# Patient Record
Sex: MALE | Race: BLACK OR AFRICAN AMERICAN | Employment: OTHER | URBAN - METROPOLITAN AREA
[De-identification: names, ages, dates, MRNs, and addresses within clinical notes are randomized per-mention and may not be internally consistent; named-entity substitution may affect disease eponyms.]

---

## 2017-04-19 ENCOUNTER — HOSPITAL ENCOUNTER (OUTPATIENT)
Dept: ENDOSCOPY | Age: 67
Discharge: HOME | End: 2017-04-19
Attending: INTERNAL MEDICINE | Admitting: INTERNAL MEDICINE

## 2017-04-19 VITALS
RESPIRATION RATE: 16 BRPM | HEART RATE: 50 BPM | BODY MASS INDEX: 28.88 KG/M2 | WEIGHT: 195 LBS | SYSTOLIC BLOOD PRESSURE: 161 MMHG | OXYGEN SATURATION: 98 % | HEIGHT: 69 IN | DIASTOLIC BLOOD PRESSURE: 94 MMHG | TEMPERATURE: 97.3 F

## 2017-04-19 RX ORDER — FENTANYL CITRATE 50 UG/ML
50 INJECTION, SOLUTION INTRAMUSCULAR; INTRAVENOUS EVERY 5 MIN PRN
Status: DISCONTINUED | OUTPATIENT
Start: 2017-04-19 | End: 2017-04-20 | Stop reason: HOSPADM

## 2017-04-19 RX ORDER — POTASSIUM CHLORIDE 7.45 MG/ML
10 INJECTION INTRAVENOUS 2 TIMES DAILY
COMMUNITY
End: 2019-03-06 | Stop reason: SDUPTHER

## 2017-04-19 RX ORDER — OXYCODONE HYDROCHLORIDE AND ACETAMINOPHEN 5; 325 MG/1; MG/1
2 TABLET ORAL PRN
Status: ACTIVE | OUTPATIENT
Start: 2017-04-19 | End: 2017-04-19

## 2017-04-19 RX ORDER — LIDOCAINE HYDROCHLORIDE 10 MG/ML
1 INJECTION, SOLUTION EPIDURAL; INFILTRATION; INTRACAUDAL; PERINEURAL
Status: ACTIVE | OUTPATIENT
Start: 2017-04-19 | End: 2017-04-19

## 2017-04-19 RX ORDER — MEPERIDINE HYDROCHLORIDE 25 MG/ML
12.5 INJECTION INTRAMUSCULAR; INTRAVENOUS; SUBCUTANEOUS EVERY 5 MIN PRN
Status: DISCONTINUED | OUTPATIENT
Start: 2017-04-19 | End: 2017-04-20 | Stop reason: HOSPADM

## 2017-04-19 RX ORDER — LABETALOL HYDROCHLORIDE 5 MG/ML
5 INJECTION, SOLUTION INTRAVENOUS EVERY 10 MIN PRN
Status: DISCONTINUED | OUTPATIENT
Start: 2017-04-19 | End: 2017-04-20 | Stop reason: HOSPADM

## 2017-04-19 RX ORDER — FENTANYL CITRATE 50 UG/ML
25 INJECTION, SOLUTION INTRAMUSCULAR; INTRAVENOUS EVERY 5 MIN PRN
Status: DISCONTINUED | OUTPATIENT
Start: 2017-04-19 | End: 2017-04-20 | Stop reason: HOSPADM

## 2017-04-19 RX ORDER — SODIUM CHLORIDE 9 MG/ML
INJECTION, SOLUTION INTRAVENOUS CONTINUOUS
Status: DISCONTINUED | OUTPATIENT
Start: 2017-04-19 | End: 2017-04-20 | Stop reason: HOSPADM

## 2017-04-19 RX ORDER — OXYCODONE HYDROCHLORIDE AND ACETAMINOPHEN 5; 325 MG/1; MG/1
1 TABLET ORAL PRN
Status: ACTIVE | OUTPATIENT
Start: 2017-04-19 | End: 2017-04-19

## 2017-04-19 RX ORDER — HYDROMORPHONE HCL 110MG/55ML
0.5 PATIENT CONTROLLED ANALGESIA SYRINGE INTRAVENOUS EVERY 5 MIN PRN
Status: DISCONTINUED | OUTPATIENT
Start: 2017-04-19 | End: 2017-04-20 | Stop reason: HOSPADM

## 2017-04-19 RX ORDER — ONDANSETRON 2 MG/ML
4 INJECTION INTRAMUSCULAR; INTRAVENOUS
Status: ACTIVE | OUTPATIENT
Start: 2017-04-19 | End: 2017-04-19

## 2017-04-19 RX ORDER — HYDROMORPHONE HCL 110MG/55ML
0.25 PATIENT CONTROLLED ANALGESIA SYRINGE INTRAVENOUS EVERY 5 MIN PRN
Status: DISCONTINUED | OUTPATIENT
Start: 2017-04-19 | End: 2017-04-20 | Stop reason: HOSPADM

## 2017-04-19 RX ORDER — SODIUM CHLORIDE 0.9 % (FLUSH) 0.9 %
10 SYRINGE (ML) INJECTION EVERY 12 HOURS SCHEDULED
Status: DISCONTINUED | OUTPATIENT
Start: 2017-04-19 | End: 2017-04-20 | Stop reason: HOSPADM

## 2017-04-19 RX ORDER — SODIUM CHLORIDE 0.9 % (FLUSH) 0.9 %
10 SYRINGE (ML) INJECTION PRN
Status: DISCONTINUED | OUTPATIENT
Start: 2017-04-19 | End: 2017-04-20 | Stop reason: HOSPADM

## 2017-04-19 RX ADMIN — SODIUM CHLORIDE: 9 INJECTION, SOLUTION INTRAVENOUS at 10:03

## 2017-04-19 ASSESSMENT — PAIN - FUNCTIONAL ASSESSMENT: PAIN_FUNCTIONAL_ASSESSMENT: 0-10

## 2018-03-09 ENCOUNTER — HOSPITAL ENCOUNTER (OUTPATIENT)
Dept: CT IMAGING | Age: 68
Discharge: HOME | End: 2018-03-09
Attending: INTERNAL MEDICINE | Admitting: INTERNAL MEDICINE

## 2018-03-09 DIAGNOSIS — Q61.00 CONGENITAL RENAL CYST OF RIGHT KIDNEY: ICD-10-CM

## 2018-03-09 DIAGNOSIS — Q61.00 CONGENITAL RENAL CYST: ICD-10-CM

## 2018-05-15 RX ORDER — MEPERIDINE HYDROCHLORIDE 25 MG/ML
12.5 INJECTION INTRAMUSCULAR; INTRAVENOUS; SUBCUTANEOUS EVERY 5 MIN PRN
Status: CANCELLED | OUTPATIENT
Start: 2018-05-15

## 2018-05-15 RX ORDER — PROMETHAZINE HYDROCHLORIDE 25 MG/ML
6.25 INJECTION, SOLUTION INTRAMUSCULAR; INTRAVENOUS EVERY 30 MIN PRN
Status: CANCELLED | OUTPATIENT
Start: 2018-05-15

## 2018-05-15 RX ORDER — HYDROMORPHONE HCL 110MG/55ML
0.25 PATIENT CONTROLLED ANALGESIA SYRINGE INTRAVENOUS EVERY 5 MIN PRN
Status: CANCELLED | OUTPATIENT
Start: 2018-05-15

## 2018-05-15 RX ORDER — FENTANYL CITRATE 50 UG/ML
50 INJECTION, SOLUTION INTRAMUSCULAR; INTRAVENOUS EVERY 5 MIN PRN
Status: CANCELLED | OUTPATIENT
Start: 2018-05-15

## 2018-05-15 RX ORDER — HYDROCODONE BITARTRATE AND ACETAMINOPHEN 5; 325 MG/1; MG/1
1 TABLET ORAL PRN
Status: CANCELLED | OUTPATIENT
Start: 2018-05-15 | End: 2018-05-15

## 2018-05-15 RX ORDER — HYDROMORPHONE HCL 110MG/55ML
0.5 PATIENT CONTROLLED ANALGESIA SYRINGE INTRAVENOUS EVERY 5 MIN PRN
Status: CANCELLED | OUTPATIENT
Start: 2018-05-15

## 2018-05-15 RX ORDER — DIPHENHYDRAMINE HYDROCHLORIDE 50 MG/ML
12.5 INJECTION INTRAMUSCULAR; INTRAVENOUS
Status: CANCELLED | OUTPATIENT
Start: 2018-05-15 | End: 2018-05-15

## 2018-05-15 RX ORDER — FENTANYL CITRATE 50 UG/ML
25 INJECTION, SOLUTION INTRAMUSCULAR; INTRAVENOUS EVERY 5 MIN PRN
Status: CANCELLED | OUTPATIENT
Start: 2018-05-15

## 2018-05-15 RX ORDER — HYDROCODONE BITARTRATE AND ACETAMINOPHEN 5; 325 MG/1; MG/1
2 TABLET ORAL PRN
Status: CANCELLED | OUTPATIENT
Start: 2018-05-15 | End: 2018-05-15

## 2018-06-06 ENCOUNTER — HOSPITAL ENCOUNTER (OUTPATIENT)
Dept: ENDOSCOPY | Age: 68
Discharge: HOME | End: 2018-06-06
Attending: INTERNAL MEDICINE | Admitting: INTERNAL MEDICINE

## 2018-06-06 VITALS
WEIGHT: 200 LBS | OXYGEN SATURATION: 98 % | SYSTOLIC BLOOD PRESSURE: 176 MMHG | HEIGHT: 69 IN | HEART RATE: 56 BPM | BODY MASS INDEX: 29.62 KG/M2 | RESPIRATION RATE: 16 BRPM | DIASTOLIC BLOOD PRESSURE: 99 MMHG | TEMPERATURE: 97.1 F

## 2018-06-06 RX ORDER — OMEPRAZOLE 20 MG/1
20 CAPSULE, DELAYED RELEASE ORAL DAILY
COMMUNITY
End: 2021-03-16

## 2018-06-06 RX ORDER — POTASSIUM CHLORIDE 20 MEQ/1
20 TABLET, EXTENDED RELEASE ORAL 2 TIMES DAILY
COMMUNITY
End: 2019-08-06

## 2018-06-06 RX ORDER — SODIUM CHLORIDE 0.9 % (FLUSH) 0.9 %
10 SYRINGE (ML) INJECTION EVERY 12 HOURS SCHEDULED
Status: DISCONTINUED | OUTPATIENT
Start: 2018-06-06 | End: 2018-06-07 | Stop reason: HOSPADM

## 2018-06-06 RX ORDER — DIPHENHYDRAMINE HYDROCHLORIDE 50 MG/ML
12.5 INJECTION INTRAMUSCULAR; INTRAVENOUS
Status: ACTIVE | OUTPATIENT
Start: 2018-06-06 | End: 2018-06-06

## 2018-06-06 RX ORDER — PROMETHAZINE HYDROCHLORIDE 25 MG/ML
6.25 INJECTION, SOLUTION INTRAMUSCULAR; INTRAVENOUS EVERY 30 MIN PRN
Status: DISCONTINUED | OUTPATIENT
Start: 2018-06-06 | End: 2018-06-07 | Stop reason: HOSPADM

## 2018-06-06 RX ORDER — HYDROMORPHONE HCL 110MG/55ML
0.25 PATIENT CONTROLLED ANALGESIA SYRINGE INTRAVENOUS EVERY 5 MIN PRN
Status: DISCONTINUED | OUTPATIENT
Start: 2018-06-06 | End: 2018-06-07 | Stop reason: HOSPADM

## 2018-06-06 RX ORDER — SODIUM CHLORIDE 9 MG/ML
INJECTION, SOLUTION INTRAVENOUS CONTINUOUS
Status: DISCONTINUED | OUTPATIENT
Start: 2018-06-06 | End: 2018-06-07 | Stop reason: HOSPADM

## 2018-06-06 RX ORDER — FENTANYL CITRATE 50 UG/ML
25 INJECTION, SOLUTION INTRAMUSCULAR; INTRAVENOUS EVERY 5 MIN PRN
Status: DISCONTINUED | OUTPATIENT
Start: 2018-06-06 | End: 2018-06-07 | Stop reason: HOSPADM

## 2018-06-06 RX ORDER — HYDROMORPHONE HCL 110MG/55ML
0.5 PATIENT CONTROLLED ANALGESIA SYRINGE INTRAVENOUS EVERY 5 MIN PRN
Status: DISCONTINUED | OUTPATIENT
Start: 2018-06-06 | End: 2018-06-07 | Stop reason: HOSPADM

## 2018-06-06 RX ORDER — SODIUM CHLORIDE 0.9 % (FLUSH) 0.9 %
10 SYRINGE (ML) INJECTION PRN
Status: DISCONTINUED | OUTPATIENT
Start: 2018-06-06 | End: 2018-06-07 | Stop reason: HOSPADM

## 2018-06-06 RX ORDER — FENTANYL CITRATE 50 UG/ML
50 INJECTION, SOLUTION INTRAMUSCULAR; INTRAVENOUS EVERY 5 MIN PRN
Status: DISCONTINUED | OUTPATIENT
Start: 2018-06-06 | End: 2018-06-07 | Stop reason: HOSPADM

## 2018-06-06 RX ORDER — MULTIVIT-MIN/IRON/FOLIC ACID/K 18-600-40
CAPSULE ORAL DAILY
COMMUNITY
End: 2022-08-29

## 2018-06-06 RX ORDER — MEPERIDINE HYDROCHLORIDE 25 MG/ML
12.5 INJECTION INTRAMUSCULAR; INTRAVENOUS; SUBCUTANEOUS EVERY 5 MIN PRN
Status: DISCONTINUED | OUTPATIENT
Start: 2018-06-06 | End: 2018-06-07 | Stop reason: HOSPADM

## 2018-06-06 RX ORDER — HYDROCODONE BITARTRATE AND ACETAMINOPHEN 5; 325 MG/1; MG/1
2 TABLET ORAL PRN
Status: ACTIVE | OUTPATIENT
Start: 2018-06-06 | End: 2018-06-06

## 2018-06-06 RX ORDER — HYDROCODONE BITARTRATE AND ACETAMINOPHEN 5; 325 MG/1; MG/1
1 TABLET ORAL PRN
Status: ACTIVE | OUTPATIENT
Start: 2018-06-06 | End: 2018-06-06

## 2018-06-06 RX ORDER — LIDOCAINE HYDROCHLORIDE 10 MG/ML
1 INJECTION, SOLUTION EPIDURAL; INFILTRATION; INTRACAUDAL; PERINEURAL
Status: ACTIVE | OUTPATIENT
Start: 2018-06-06 | End: 2018-06-06

## 2018-06-06 RX ADMIN — SODIUM CHLORIDE: 9 INJECTION, SOLUTION INTRAVENOUS at 12:12

## 2018-06-06 ASSESSMENT — PAIN - FUNCTIONAL ASSESSMENT: PAIN_FUNCTIONAL_ASSESSMENT: 0-10

## 2018-06-06 ASSESSMENT — PAIN SCALES - GENERAL
PAINLEVEL_OUTOF10: 0

## 2019-03-06 ENCOUNTER — OFFICE VISIT (OUTPATIENT)
Dept: CARDIOLOGY CLINIC | Age: 69
End: 2019-03-06
Payer: MEDICARE

## 2019-03-06 VITALS
HEART RATE: 60 BPM | WEIGHT: 215.8 LBS | DIASTOLIC BLOOD PRESSURE: 74 MMHG | BODY MASS INDEX: 31.96 KG/M2 | HEIGHT: 69 IN | SYSTOLIC BLOOD PRESSURE: 134 MMHG

## 2019-03-06 DIAGNOSIS — E78.49 OTHER HYPERLIPIDEMIA: ICD-10-CM

## 2019-03-06 DIAGNOSIS — I49.9 IRREGULAR HEART BEAT: Primary | ICD-10-CM

## 2019-03-06 DIAGNOSIS — G47.33 OSA (OBSTRUCTIVE SLEEP APNEA): ICD-10-CM

## 2019-03-06 DIAGNOSIS — R94.31 ABNORMAL EKG: ICD-10-CM

## 2019-03-06 PROCEDURE — G8484 FLU IMMUNIZE NO ADMIN: HCPCS | Performed by: INTERNAL MEDICINE

## 2019-03-06 PROCEDURE — G8417 CALC BMI ABV UP PARAM F/U: HCPCS | Performed by: INTERNAL MEDICINE

## 2019-03-06 PROCEDURE — G8427 DOCREV CUR MEDS BY ELIG CLIN: HCPCS | Performed by: INTERNAL MEDICINE

## 2019-03-06 PROCEDURE — 3017F COLORECTAL CA SCREEN DOC REV: CPT | Performed by: INTERNAL MEDICINE

## 2019-03-06 PROCEDURE — 93000 ELECTROCARDIOGRAM COMPLETE: CPT | Performed by: INTERNAL MEDICINE

## 2019-03-06 PROCEDURE — 1101F PT FALLS ASSESS-DOCD LE1/YR: CPT | Performed by: INTERNAL MEDICINE

## 2019-03-06 PROCEDURE — 99214 OFFICE O/P EST MOD 30 MIN: CPT | Performed by: INTERNAL MEDICINE

## 2019-03-07 ENCOUNTER — NURSE ONLY (OUTPATIENT)
Dept: CARDIOLOGY CLINIC | Age: 69
End: 2019-03-07

## 2019-03-07 DIAGNOSIS — I48.91 ATRIAL FIBRILLATION, UNSPECIFIED TYPE (HCC): Primary | ICD-10-CM

## 2019-03-07 DIAGNOSIS — R94.31 ABNORMAL EKG: Primary | ICD-10-CM

## 2019-03-07 DIAGNOSIS — R07.9 CHEST PAIN, UNSPECIFIED TYPE: ICD-10-CM

## 2019-03-07 DIAGNOSIS — R94.31 ABNORMAL EKG: ICD-10-CM

## 2019-03-07 PROCEDURE — 93224 XTRNL ECG REC UP TO 48 HRS: CPT | Performed by: INTERNAL MEDICINE

## 2019-03-27 LAB
LV EF: 72 %
LVEF MODALITY: NORMAL

## 2019-03-29 LAB — TSH ULTRASENSITIVE: 1.45 MCIU/ML (ref 0.27–4.2)

## 2019-03-31 LAB — VITAMIN D 1,25-DIHYDROXY: 61.3 PG/ML (ref 19.9–79.3)

## 2019-04-02 DIAGNOSIS — R94.31 ABNORMAL EKG: ICD-10-CM

## 2019-04-10 ENCOUNTER — OFFICE VISIT (OUTPATIENT)
Dept: CARDIOLOGY CLINIC | Age: 69
End: 2019-04-10
Payer: MEDICARE

## 2019-04-10 VITALS
SYSTOLIC BLOOD PRESSURE: 121 MMHG | HEART RATE: 63 BPM | BODY MASS INDEX: 31.45 KG/M2 | WEIGHT: 213 LBS | DIASTOLIC BLOOD PRESSURE: 80 MMHG

## 2019-04-10 DIAGNOSIS — R00.1 BRADYCARDIA: ICD-10-CM

## 2019-04-10 PROCEDURE — 1123F ACP DISCUSS/DSCN MKR DOCD: CPT | Performed by: NURSE PRACTITIONER

## 2019-04-10 PROCEDURE — G8427 DOCREV CUR MEDS BY ELIG CLIN: HCPCS | Performed by: NURSE PRACTITIONER

## 2019-04-10 PROCEDURE — 3017F COLORECTAL CA SCREEN DOC REV: CPT | Performed by: NURSE PRACTITIONER

## 2019-04-10 PROCEDURE — G8417 CALC BMI ABV UP PARAM F/U: HCPCS | Performed by: NURSE PRACTITIONER

## 2019-04-10 PROCEDURE — 1036F TOBACCO NON-USER: CPT | Performed by: NURSE PRACTITIONER

## 2019-04-10 PROCEDURE — 99214 OFFICE O/P EST MOD 30 MIN: CPT | Performed by: NURSE PRACTITIONER

## 2019-04-10 PROCEDURE — 4040F PNEUMOC VAC/ADMIN/RCVD: CPT | Performed by: NURSE PRACTITIONER

## 2019-04-10 NOTE — PATIENT INSTRUCTIONS
Plan:  Holter monitor  4/2019 HR     1st degree av block / Wenckebach with lightheaded and syncope years ago    Stop bystolic cont Norvasc   Discussed PPM / EP consult  Sleep study scheduled   Fu in 3-4 weeks

## 2019-04-10 NOTE — PROGRESS NOTES
gastric nodule biopsy     Family History   Problem Relation Age of Onset    High Blood Pressure Mother      Social History     Tobacco Use    Smoking status: Never Smoker    Smokeless tobacco: Never Used   Substance Use Topics    Alcohol use: No    Drug use: No       No Known Allergies  Current Outpatient Medications   Medication Sig Dispense Refill    potassium chloride (KLOR-CON M) 20 MEQ extended release tablet Take 20 mEq by mouth 2 times daily       Cholecalciferol (VITAMIN D) 2000 units CAPS capsule Take by mouth daily      omeprazole (PRILOSEC) 20 MG delayed release capsule Take 20 mg by mouth daily      nebivolol (BYSTOLIC) 5 MG tablet Take 5 mg by mouth daily.  amLODIPine (NORVASC) 10 MG tablet Take 10 mg by mouth daily. No current facility-administered medications for this visit. Physical Exam:   /80   Pulse 63   Wt 213 lb (96.6 kg)   BMI 31.45 kg/m²   BP Readings from Last 3 Encounters:   04/10/19 121/80   03/06/19 134/74   06/06/18 (!) 176/99     Pulse Readings from Last 3 Encounters:   04/10/19 63   03/06/19 60   06/06/18 56     Wt Readings from Last 3 Encounters:   04/10/19 213 lb (96.6 kg)   03/06/19 215 lb 12.8 oz (97.9 kg)   06/06/18 200 lb (90.7 kg)     Constitutional: He is oriented to person, place, and time. He appears well-developed and well-nourished. In no acute distress. HEENT: Normocephalic and atraumatic. Sclerae anicteric. No xanthelasmas. Conjunctiva white, no subconjunctival hemorrhage   External inspection of ears nose teeth & gums   Eyes:PERRLA EOM's intact. Neck: Neck supple. No JVD present. Carotids without bruits. No mass and no thyromegaly present. No lymphadenopathy present. Cardiovascular: RRR, normal S1 and S2; no murmur/gallop or rub, PMI nondisplaced  Pulmonary/Chest: Effort normal.  Lungs clear to auscultation. Chest wall nontender  Abdominal: soft, nontender, nondistended. + bowel sounds; no organomegaly or bruits.  Aorta normal  Extremities: No edema, cyanosis, or clubbing. Pulses are 2+ radial/carotid/dorsalis pedis bilaterally. Cap refill brisk. Neurological: No cranial nerve deficit. Psychiatric: He has a normal mood and affect. His speech is normal and behavior is normal.     Lab Review:   No results found for: TRIG, HDL, LDLCALC, LDLDIRECT, LABVLDL  No results found for: NA, K, CL, CO2, BUN, CREATININE, GLUCOSE, CALCIUM   No results found for: WBC, HGB, HCT, MCV, PLT      I have reviewed any available labs, images, any stress test, LHC on this pt       Please cc this note to PCP    Assessment:    pt with abnormal EKG / this was noted during colonoscopy     Today no c/o cp SOB edema, denies PND, MC? ? Had syncopal event over 5 years ago after much testing etiology was unknown      stress test 3/2013 LV EF is 56%  There is a small sized, fixed defect in the inferior wall. This defect is consistent with diaphragm attenuation. Frequent PVC's.      Hx PVCs  On bystolic  TSH wnl      Hx HTN  wnl     hypokalemia   On potassium klor con 20meq daily      MC? ? Sleep study ordered & has appt     Today no c/o cp SOB edmea, / PND  /  having tests for MC   Discussed his holter /  echo  / stress test result     3/2019 stress test / no significant abnormally   Holter monitor  4/2019 HR     1st degree av block / Wenckebach with lightheaded and syncope years ago    Stop bystolic cont Norvasc     3/27/19 echo :  Overall left ventricular ejection fraction is estimated to be 60-65%. The left ventricular function is normal.  There is mild concentric left ventricular hypertrophy. The left ventricular wall motion is normal.  The diastolic function is impaired and classified as Grade 1  (impaired relaxation). The left atrium is mildly dilated. Mild tricuspid regurgitation is present.        Plan:  Holter monitor  4/2019 HR     1st degree av block / Wenckebach with lightheaded and syncope years ago    Stop bystolic cont Norvasc Discussed PPM / EP consult  Sleep study scheduled   Fu in 3-4 weeks       Thanks for allowing me to participate in the care of this patient.   Please cc this note to PCP      NICOLE Flynn

## 2019-04-15 NOTE — PROGRESS NOTES
supple. No rigidity. No JVD present. · Cardiovascular: Normal rate, regular rhythm, S1&S2 and intact distal pulses. · Pulmonary/Chest: Bilateral respiratory sounds. No wheezes. No rhonchi. · Abdominal: Soft. Bowel sounds present. No distension, No tenderness. · Musculoskeletal: No tenderness. No edema    · Lymphadenopathy: Has no cervical adenopathy. · Neurological: Alert and oriented. Cranial nerve appears intact, No Gross deficit   · Skin: Skin is warm and dry. No rash noted. · Psychiatric: Has a normal mood, affect and behavior     Labs:  Reviewed. ECG: reviewed, 63 Sinus  Rhythm, with QRS duration 100 ms. No pathologic Q waves, ventricular pre-excitation, or QT prolongation. Studies:   1. Event monitor: Holter monitor  4/2019 HR     1st degree av block / Wenckebach     2.8 seconds nocturnal pause    2. Echo:   3/27/19 echo :  Overall left ventricular ejection fraction is estimated to be 60-65%. The left ventricular function is normal.  There is mild concentric left ventricular hypertrophy. The left ventricular wall motion is normal.  The diastolic function is impaired and classified as Grade 1  (impaired relaxation). The left atrium is mildly dilated. Mild tricuspid regurgitation is present. 3. Stress Test:  3/7/19  IMAGING FINDINGS:  LVEDV:   119ml     (Normal is up to 100ml for women and 150ml for men)  LVEF:   72 %      (Normal is at least 62% for women and 53% for men)  TRANSIENT DILATATION RATIO:    0.72      (Normal is up to 1.3 for women and 1.2 for men)  LV WALL MOTION:   Unremarkable  SPECT PERFUSION IMAGES:   Satisfactory activity throughout the left ventricle myocardium at   stress and at rest     OTHER:  None      IMPRESSION:  No significant abnormality      The MCOT, echocardiogram, stress test, and coronary angiography/PCI were reviewed by myself and used for my plan of care.      Procedures:  1. EP study    Assessment/Plan:   Hx of Syncope - fell face forward  Significantly prolonged VT interval, more than 400 ms  Mobitz Type 1 block  Nocturnal pause of 2.8 seconds  Normal LVEF  Recommend EP study to evaluate conduction system and ventricular arrhythmia. Ahsan Delgado RN, am scribing for and in the presence of Elijah Potter MD 04/23/19   10:33 AM  WENCESLAO Rivers MD, personally performed the services prescribed in this documentation as scribed by Ms. Severo Mederos RN in my presence and it is both accurate and complete. Thank you for allowing me to participate in the care of [facility]. All questions and concerns were addressed to the patient/family. Alternatives to my treatment were discussed.        Elijah Potter MD  Cardiac Electrophysiology  [facility] [place]

## 2019-04-23 ENCOUNTER — TELEPHONE (OUTPATIENT)
Dept: CARDIOLOGY CLINIC | Age: 69
End: 2019-04-23

## 2019-04-23 ENCOUNTER — OFFICE VISIT (OUTPATIENT)
Dept: CARDIOLOGY CLINIC | Age: 69
End: 2019-04-23
Payer: MEDICARE

## 2019-04-23 VITALS
HEIGHT: 69 IN | DIASTOLIC BLOOD PRESSURE: 82 MMHG | HEART RATE: 62 BPM | WEIGHT: 210.6 LBS | BODY MASS INDEX: 31.19 KG/M2 | SYSTOLIC BLOOD PRESSURE: 142 MMHG

## 2019-04-23 DIAGNOSIS — R94.31 ABNORMAL EKG: ICD-10-CM

## 2019-04-23 DIAGNOSIS — R00.1 BRADYCARDIA: Primary | ICD-10-CM

## 2019-04-23 DIAGNOSIS — I48.91 ATRIAL FIBRILLATION, UNSPECIFIED TYPE (HCC): ICD-10-CM

## 2019-04-23 PROCEDURE — 93000 ELECTROCARDIOGRAM COMPLETE: CPT | Performed by: INTERNAL MEDICINE

## 2019-04-23 PROCEDURE — 99204 OFFICE O/P NEW MOD 45 MIN: CPT | Performed by: INTERNAL MEDICINE

## 2019-04-25 ENCOUNTER — PRE-PROCEDURE TELEPHONE (OUTPATIENT)
Dept: CARDIAC CATH/INVASIVE PROCEDURES | Age: 69
End: 2019-04-25

## 2019-04-26 ENCOUNTER — HOSPITAL ENCOUNTER (OUTPATIENT)
Dept: CARDIAC CATH/INVASIVE PROCEDURES | Age: 69
Discharge: HOME | End: 2019-04-28
Payer: MEDICARE

## 2019-04-26 VITALS — TEMPERATURE: 97.2 F

## 2019-04-26 LAB
ANION GAP SERPL CALCULATED.3IONS-SCNC: 13 MMOL/L (ref 3–16)
BUN BLDV-MCNC: 23 MG/DL (ref 7–20)
CALCIUM SERPL-MCNC: 9.3 MG/DL (ref 8.3–10.6)
CHLORIDE BLD-SCNC: 102 MMOL/L (ref 99–110)
CO2: 26 MMOL/L (ref 21–32)
CREAT SERPL-MCNC: 1.2 MG/DL (ref 0.8–1.3)
EKG ATRIAL RATE: 61 BPM
EKG DIAGNOSIS: NORMAL
EKG Q-T INTERVAL: 424 MS
EKG QRS DURATION: 88 MS
EKG QTC CALCULATION (BAZETT): 424 MS
EKG R AXIS: 21 DEGREES
EKG T AXIS: -85 DEGREES
EKG VENTRICULAR RATE: 60 BPM
GFR AFRICAN AMERICAN: >60
GFR NON-AFRICAN AMERICAN: >60
GLUCOSE BLD-MCNC: 85 MG/DL (ref 70–99)
HCT VFR BLD CALC: 41.6 % (ref 40.5–52.5)
HEMOGLOBIN: 13.7 G/DL (ref 13.5–17.5)
INR BLD: 1.25 (ref 0.86–1.14)
MCH RBC QN AUTO: 28.7 PG (ref 26–34)
MCHC RBC AUTO-ENTMCNC: 32.8 G/DL (ref 31–36)
MCV RBC AUTO: 87.3 FL (ref 80–100)
PDW BLD-RTO: 13.3 % (ref 12.4–15.4)
PLATELET # BLD: 215 K/UL (ref 135–450)
PMV BLD AUTO: 8.7 FL (ref 5–10.5)
POTASSIUM REFLEX MAGNESIUM: 3.6 MMOL/L (ref 3.5–5.1)
PROTHROMBIN TIME: 14.3 SEC (ref 9.8–13)
RBC # BLD: 4.77 M/UL (ref 4.2–5.9)
SODIUM BLD-SCNC: 141 MMOL/L (ref 136–145)
WBC # BLD: 5.8 K/UL (ref 4–11)

## 2019-04-26 PROCEDURE — 80048 BASIC METABOLIC PNL TOTAL CA: CPT

## 2019-04-26 PROCEDURE — 99152 MOD SED SAME PHYS/QHP 5/>YRS: CPT

## 2019-04-26 PROCEDURE — C1730 CATH, EP, 19 OR FEW ELECT: HCPCS

## 2019-04-26 PROCEDURE — 93005 ELECTROCARDIOGRAM TRACING: CPT | Performed by: INTERNAL MEDICINE

## 2019-04-26 PROCEDURE — 93010 ELECTROCARDIOGRAM REPORT: CPT | Performed by: INTERNAL MEDICINE

## 2019-04-26 PROCEDURE — C1894 INTRO/SHEATH, NON-LASER: HCPCS

## 2019-04-26 PROCEDURE — 99153 MOD SED SAME PHYS/QHP EA: CPT

## 2019-04-26 PROCEDURE — 85027 COMPLETE CBC AUTOMATED: CPT

## 2019-04-26 PROCEDURE — 93619 COMPREHENSIVE EP EVALUATION: CPT

## 2019-04-26 PROCEDURE — 85610 PROTHROMBIN TIME: CPT

## 2019-04-26 PROCEDURE — 2709999900 HC NON-CHARGEABLE SUPPLY

## 2019-04-26 PROCEDURE — 93620 COMP EP EVL R AT VEN PAC&REC: CPT | Performed by: INTERNAL MEDICINE

## 2019-04-26 RX ORDER — SODIUM CHLORIDE 9 MG/ML
INJECTION, SOLUTION INTRAVENOUS CONTINUOUS
Status: DISCONTINUED | OUTPATIENT
Start: 2019-04-26 | End: 2019-04-29 | Stop reason: HOSPADM

## 2019-04-26 RX ORDER — SODIUM CHLORIDE 0.9 % (FLUSH) 0.9 %
10 SYRINGE (ML) INJECTION EVERY 12 HOURS SCHEDULED
Status: DISCONTINUED | OUTPATIENT
Start: 2019-04-26 | End: 2019-04-29 | Stop reason: HOSPADM

## 2019-04-26 RX ORDER — SODIUM CHLORIDE 0.9 % (FLUSH) 0.9 %
10 SYRINGE (ML) INJECTION PRN
Status: DISCONTINUED | OUTPATIENT
Start: 2019-04-26 | End: 2019-04-29 | Stop reason: HOSPADM

## 2019-04-26 NOTE — PROCEDURES
[address]     Electrophysiology Procedure Note       Date of Procedure: 4/26/2019  Patient's Name: Wendie Maher  YOB: 1950   Medical Record Number: [id number]  Procedure Performed by: Lamin Palafox MD    Procedure performed:    · Comprehensive electrophysiological study with attempted induction of ventricular dysrhythmia at baseline. · Anesthesia: Local and Monitored Anesthesia Care  · Level of sedation plan: Moderate sedation (conscious sedation) with intravenous Midazolam 2 mg and Fentanyl 100 mcg and propofol 70 mg  · Mallampati airway assessment class: II  · ASA class: 2  · (there were no independent trained observers who had no other duties involved in this procedure)      Indication of the procedure:    Wendie Maher is a 79 y.o. male with presentation of syncope, abnormal Holter with 2.8 second sinus pause. He is now taken to the EP laboratory to induce evaluate sinus node, AV node, His bundle and to induce ventricular dysrhythmia as an etiology for his syncope. Details of procedure: The risks, benefits and alternatives of the ablation procedure were discussed with the patient. The risks including, but not limited to, the risks of bleeding, infection, radiation exposure, injury to vascular, cardiac and surrounding structures (including pneumothorax), stroke, cardiac perforation, tamponade, need for emergent open heart surgery, need for pacemaker implantation, myocardial infarction and death were discussed in detail. The patient opted to proceed with this procedure. Written informed consent was signed and placed in the chart. The patient was brought to the electrophysiology lab in a fasting nonsedated state. A timeout protocol was completed to identify the patient and the procedure being performed. Pre-sedation evaluation and airway assessment was completed. IV sedation was provided with IV Versed, Fentanyl.  The patient was monitored continuously with ECG, pulse oximetry, blood pressure monitoring, and direct observation. Both groins were prepped and draped in the usual sterile fashion. After injection of 2% lidocaine in the right groin, right femoral vein was accessed using modified Seldinger's technique, with ultrasound guidance. The following catheters was placed. 6Fr sheath for a 6F Quad to the RV   6Fr sheath for a 6F quad to the RA   6F sheath for a 6F octapolar catheter to the His     With fluoroscopy, we advanced two quadripolar catheters (CRD 5-5-5 spaced electrodes) sequentially to the high right atrium and right ventricular apex, while a CRD2 catheter was placed in the HIS position. After that, we did baseline measurements by pacing in both atria and ventrilce and programmed stimulation. . Sinus cycle length was 1013 msec  . KS interval: 450 msec  . QRS duration: 90 msec  . QT interval: 412 msec  . A-H interval of 414 msec  . H-V interval of 39 msec  . 1:1 antegrade conduction over AV block (AV arlin wenckebach cycle length) was 780 msec   . AV node ERP of 800 / 710 msec   . 1:1 retrograde conduction over AV node (VA wenckebach cycle lenght) was 670 msec  . VA ERP of 600/220 msec     Dysrhythmia Induction:  Programmed ventricular stimulation was performed, upto double extrastimuli at two basic drive cycle lengths. No inducible ventricular tachycardia. On ventricular pacing, he developed very slow, atypical AVNRT with a cycle length of 720 ms. This wasn't repeated finding. However, this was not a clinical symptoms and patient did not have any history of palpitations. Hence, I decided not to go after this very slow atypical AVNRT. 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

## 2019-04-26 NOTE — PRE SEDATION
Sedation Pre-Procedure Note    Patient Name: Ozzy Jones   YOB: 1950  Room/Bed: Room/bed info not found  Medical Record Number: [id number]  Date: 4/26/2019   Time: 3:17 PM       Indication:  Syncope    Consent: I have discussed with the patient and/or the patient representative the indication, alternatives, and the possible risks and/or complications of the planned procedure and the anesthesia methods. The patient and/or patient representative appear to understand and agree to proceed. Vital Signs:   Vitals:    04/26/19 1113   Temp: 97.2 °F (36.2 °C)       Past Medical History:   has a past medical history of Cancer ([facility] [address] and Hypertension. Past Surgical History:   has a past surgical history that includes hernia repair; Small intestine surgery; shoulder surgery; Colonoscopy; Cataract removal with implant (08/14/2012); Cataract removal with implant (8/28/2012); shoulder surgery (12-26-13); Upper gastrointestinal endoscopy (04/19/2017); and Upper gastrointestinal endoscopy (06/06/2018). Medications:   Scheduled Meds:    sodium chloride flush  10 mL Intravenous 2 times per day    ceFAZolin  2 g Intravenous Once    irrigation builder  2 g Irrigation Once     Continuous Infusions:    sodium chloride       PRN Meds: sodium chloride flush  Home Meds:   Prior to Admission medications    Medication Sig Start Date End Date Taking? Authorizing Provider   potassium chloride (KLOR-CON M) 20 MEQ extended release tablet Take 20 mEq by mouth 2 times daily    Yes Historical Provider, MD   Cholecalciferol (VITAMIN D) 2000 units CAPS capsule Take by mouth daily   Yes Historical Provider, MD   amLODIPine (NORVASC) 10 MG tablet Take 10 mg by mouth daily.      Yes Historical Provider, MD   omeprazole (PRILOSEC) 20 MG delayed release capsule Take 20 mg by mouth daily    Historical Provider, MD     Coumadin Use Last 7 Days:  no  Antiplatelet drug therapy use last 7 days: no  Other anticoagulant use last 7 days:

## 2019-04-26 NOTE — H&P
Patient's History and Physical from April 23, 2019 by me  was reviewed. Patient examined. There has been no change.       Akbar Flores MD   Cardiac Electrophysiology  [facility]  Office [phone]  [facility]

## 2019-05-02 ENCOUNTER — OFFICE VISIT (OUTPATIENT)
Dept: PULMONOLOGY | Age: 69
End: 2019-05-02
Payer: MEDICARE

## 2019-05-02 VITALS
HEART RATE: 73 BPM | WEIGHT: 208 LBS | SYSTOLIC BLOOD PRESSURE: 122 MMHG | DIASTOLIC BLOOD PRESSURE: 84 MMHG | HEIGHT: 70 IN | OXYGEN SATURATION: 97 % | BODY MASS INDEX: 29.78 KG/M2

## 2019-05-02 DIAGNOSIS — R06.83 SNORING: ICD-10-CM

## 2019-05-02 DIAGNOSIS — G47.10 HYPERSOMNIA: Primary | ICD-10-CM

## 2019-05-02 DIAGNOSIS — K21.9 GERD WITHOUT ESOPHAGITIS: Chronic | ICD-10-CM

## 2019-05-02 DIAGNOSIS — I10 HYPERTENSION, ESSENTIAL: Chronic | ICD-10-CM

## 2019-05-02 PROCEDURE — 3017F COLORECTAL CA SCREEN DOC REV: CPT | Performed by: INTERNAL MEDICINE

## 2019-05-02 PROCEDURE — 4040F PNEUMOC VAC/ADMIN/RCVD: CPT | Performed by: INTERNAL MEDICINE

## 2019-05-02 PROCEDURE — 99204 OFFICE O/P NEW MOD 45 MIN: CPT | Performed by: INTERNAL MEDICINE

## 2019-05-02 PROCEDURE — 1123F ACP DISCUSS/DSCN MKR DOCD: CPT | Performed by: INTERNAL MEDICINE

## 2019-05-02 PROCEDURE — 1036F TOBACCO NON-USER: CPT | Performed by: INTERNAL MEDICINE

## 2019-05-02 PROCEDURE — G8417 CALC BMI ABV UP PARAM F/U: HCPCS | Performed by: INTERNAL MEDICINE

## 2019-05-02 PROCEDURE — G8427 DOCREV CUR MEDS BY ELIG CLIN: HCPCS | Performed by: INTERNAL MEDICINE

## 2019-05-02 ASSESSMENT — ENCOUNTER SYMPTOMS
APNEA: 1
SHORTNESS OF BREATH: 0
ABDOMINAL PAIN: 0
ABDOMINAL DISTENTION: 0
PHOTOPHOBIA: 0
RHINORRHEA: 0
ALLERGIC/IMMUNOLOGIC NEGATIVE: 1
VOMITING: 0
CHEST TIGHTNESS: 0
EYE PAIN: 0
NAUSEA: 0
CHOKING: 0

## 2019-05-02 ASSESSMENT — SLEEP AND FATIGUE QUESTIONNAIRES
HOW LIKELY ARE YOU TO NOD OFF OR FALL ASLEEP WHILE SITTING QUIETLY AFTER LUNCH WITHOUT ALCOHOL: 1
HOW LIKELY ARE YOU TO NOD OFF OR FALL ASLEEP WHILE LYING DOWN TO REST IN THE AFTERNOON WHEN CIRCUMSTANCES PERMIT: 3
HOW LIKELY ARE YOU TO NOD OFF OR FALL ASLEEP WHILE SITTING AND TALKING TO SOMEONE: 0
HOW LIKELY ARE YOU TO NOD OFF OR FALL ASLEEP IN A CAR, WHILE STOPPED FOR A FEW MINUTES IN TRAFFIC: 0
HOW LIKELY ARE YOU TO NOD OFF OR FALL ASLEEP WHEN YOU ARE A PASSENGER IN A CAR FOR AN HOUR WITHOUT A BREAK: 0
HOW LIKELY ARE YOU TO NOD OFF OR FALL ASLEEP WHILE SITTING INACTIVE IN A PUBLIC PLACE: 0
HOW LIKELY ARE YOU TO NOD OFF OR FALL ASLEEP WHILE SITTING AND READING: 2
NECK CIRCUMFERENCE (INCHES): 17
HOW LIKELY ARE YOU TO NOD OFF OR FALL ASLEEP WHILE WATCHING TV: 2
ESS TOTAL SCORE: 8

## 2019-05-02 NOTE — PROGRESS NOTES
Zak Bautista MD, FAA, [facility]  [facility] HEART AND SURGICAL [facility]  [facility]  3rd Floor,  [address] [facility], [address] [phone]   [address] [place] [address] Judie Hayes. [address] [phone]     [address] [address] [phone]    Assessment:      Visit Diagnoses and Associated Orders     Hypersomnia   (New Problem)  -  Primary    needs work-up    POLYSOMNOGRAPHY (PSG) - Diagnostic Testing [61904 Custom]   - Future Order         Snoring   (New Problem)      needs work-up    POLYSOMNOGRAPHY (PSG) - Diagnostic Testing [66413 Custom]   - Future Order         Hypertension, essential   (Stable)           GERD without esophagitis   (Stable)                  Plan:      Differential diagnosis includes but not limited to: MC, PLMD's, narcolepsy, parasomnias. Reviewed MC (which is the highest likelihood diagnosis): pathophysiology, diagnosis, complications and treatment. Instructed him not to drive if drowsy. Continue medications per his PCP and other physicians. Limit caffeine use after 3pm. Will do PSG to rule-out MC and other sleep disorders. 1 wk follow up after study to review his results. The chronic medical conditions listed are directly related to the primary diagnosis listed above. The management of the primary diagnosis affects the secondary diagnosis and vice versa. Continue meds for: HTN and GERD. Pt would medically benefit from wt loss for MC (diet, exercise, surgical). Orders Placed This Encounter   Procedures    POLYSOMNOGRAPHY (PSG) - Diagnostic Testing          Subjective:     Patient ID: Indiana Mansfield is a 79 y.o. male. Chief Complaint   Patient presents with    Snoring    Daytime Sleepiness       HPI:      Indiana Mansfield is a 79 y.o. male referred by NELA Christy -* for a sleep evaluation.  He complains of: snoring, witnessed apneas, excessive daytime sleepiness , non-restorative sleep, nocturia, AM headaches, napping, tossing and turning at night and night sweats. He denies: cataplexy and hypnagogic hallucinations. Symptoms began 2 years ago, gradually worsening since that time. hypertension and gastroesophageal reflux disease: stable on meds and followed by pt's PCP and other physicians. Previous evaluation and treatment has included- none    DOT/CDL - No  FAA/'s license -No    Previous Report(s) Reviewed: historical medical records, office notes, andreferral letter(s). Pertinent data has been documented. Pitman - Total score: 8    Caffeine Intake - None.     Social History     Socioeconomic History    Marital status:      Spouse name: Not on file    Number of children: Not on file    Years of education: Not on file    Highest education level: Not on file   Occupational History    Not on file   Social Needs    Financial resource strain: Not on file    Food insecurity:     Worry: Not on file     Inability: Not on file    Transportation needs:     Medical: Not on file     Non-medical: Not on file   Tobacco Use    Smoking status: Never Smoker    Smokeless tobacco: Never Used   Substance and Sexual Activity    Alcohol use: No    Drug use: No    Sexual activity: Not on file   Lifestyle    Physical activity:     Days per week: Not on file     Minutes per session: Not on file    Stress: Not on file   Relationships    Social connections:     Talks on phone: Not on file     Gets together: Not on file     Attends [employer] service: Not on file     Active member of club or organization: Not on file     Attends meetings of clubs or organizations: Not on file     Relationship status: Not on file    Intimate partner violence:     Fear of current or ex partner: Not on file     Emotionally abused: Not on file     Physically abused: Not on file     Forced sexual activity: Not on file   Other Topics Concern    Not on file   Social History Narrative    Not on file        Current Outpatient Medications   Medication Sig Dispense Refill    Cholecalciferol (VITAMIN D) 2000 units CAPS capsule Take by mouth daily      omeprazole (PRILOSEC) 20 MG delayed release capsule Take 20 mg by mouth daily      amLODIPine (NORVASC) 10 MG tablet Take 10 mg by mouth daily.  potassium chloride (KLOR-CON M) 20 MEQ extended release tablet Take 20 mEq by mouth 2 times daily        No current facility-administered medications for this visit. Allergies as of 05/02/2019    (No Known Allergies)       Patient Active Problem List   Diagnosis    Abnormal EKG    Other hyperlipidemia    Bradycardia    GERD without esophagitis    Hypertension, essential       Past Medical History:   Diagnosis Date    Cancer ([facility] [address]     COLON    GERD without esophagitis 5/2/2019    Hypertension     Hypertension, essential 5/2/2019       Past Surgical History:   Procedure Laterality Date    CATARACT REMOVAL WITH IMPLANT  08/14/2012    phaco left eye/ IOL    CATARACT REMOVAL WITH IMPLANT  8/28/2012    Right     COLONOSCOPY      HERNIA REPAIR       TERI    SHOULDER SURGERY      LEFT    SHOULDER SURGERY  12-26-13    BILATERAL LEVATOR ADVANCEMENT, EXCISION OF MULTIPLE NEVIS ON    SMALL INTESTINE SURGERY      UPPER GASTROINTESTINAL ENDOSCOPY  04/19/2017    UPPER GASTROINTESTINAL ENDOSCOPY  06/06/2018    EUS, gastric nodule biopsy       Family History   Problem Relation Age of Onset    High Blood Pressure Mother        Review of Systems   Constitutional: Positive for fatigue. Negative for activity change and appetite change. HENT: Positive for congestion. Negative for nosebleeds, postnasal drip, rhinorrhea and sneezing. Eyes: Negative for photophobia, pain and visual disturbance. Respiratory: Positive for apnea. Negative for choking, chest tightness and shortness of breath. Cardiovascular: Negative.     Gastrointestinal: Negative for abdominal distention, deviation present. No thyroid mass and no thyromegaly present. Cardiovascular: Normal rate, regular rhythm, S1 normal, S2 normal and normal heart sounds. Pulmonary/Chest: Effort normal. No accessory muscle usage. No apnea, no tachypnea and no bradypnea. He is not intubated. No respiratory distress. He has no decreased breath sounds. He has no wheezes. He has no rhonchi. He has no rales. He exhibits no mass, no tenderness and no deformity. Abdominal: Soft. Bowel sounds are normal. He exhibits no distension. There is no tenderness. Musculoskeletal: He exhibits no edema. Gait - normal  No evidence of cyanosis or clubbing of nails   Lymphadenopathy:        Head (right side): No submental, no submandibular and no tonsillar adenopathy present. Head (left side): No submental, no submandibular and no tonsillar adenopathy present. Neurological: He is alert and oriented to person, place, and time. No cranial nerve deficit. Skin: Skin is warm, dry and intact. No cyanosis. Nails show no clubbing. Psychiatric: He has a normal mood and affect. His speech is normal and behavior is normal. Judgment and thought content normal.   Nursing note and vitals reviewed.       Electronically signed by Cris Hu MD on5/2/2019 at 10:00 AM

## 2019-05-02 NOTE — LETTER
[facility] Sleep Medicine  [address]   [facility]  [address]  [place] [address]  Phone: [phone]  Fax: [phone]      May 2, 2019       Patient: Wendie Mhaer   MR Number: [id number]   YOB: 1950   Date of Visit: 5/2/2019     Thank you for allowing me to participate in the care of Wendie Maher. Here is my assessment and plan. Also attached is a copy of his consult note:    ASSESSMENT:  Visit Diagnoses and Associated Orders     Hypersomnia   (New Problem)  -  Primary    needs work-up    POLYSOMNOGRAPHY (PSG) - Diagnostic Testing [88866 Custom]   - Future Order         Snoring   (New Problem)      needs work-up    POLYSOMNOGRAPHY (PSG) - Diagnostic Testing [[id number] Custom]   - Future Order         Hypertension, essential   (Stable)           GERD without esophagitis   (Stable)                 Plan:     Differential diagnosis includes but not limited to: MC, PLMD's, narcolepsy, parasomnias. Reviewed MC (which is the highest likelihood diagnosis): pathophysiology, diagnosis, complications and treatment. Instructed him not to drive if drowsy. Continue medications per his PCP and other physicians. Limit caffeine use after 3pm. Will do PSG to rule-out MC and other sleep disorders. 1 wk follow up after study to review his results. The chronic medical conditions listed are directly related to the primary diagnosis listed above. The management of the primary diagnosis affects the secondary diagnosis and vice versa. Continue meds for: HTN and GERD. Pt would medically benefit from wt loss for MC (diet, exercise, surgical). Orders Placed This Encounter   Procedures    POLYSOMNOGRAPHY (PSG) - Diagnostic Testing         If you have questions or concerns, please do not hesitate to call me. I look forward to following Mayi along with you. Sincerely,      Yann Barry MD    CC providers:  NELA Collins - CNP  [address]   [address] [place] [address] [place] [address] [place] MD Joe  [address] [place] [address] Road: [phone]

## 2019-05-09 ENCOUNTER — HOSPITAL ENCOUNTER (OUTPATIENT)
Dept: SLEEP CENTER | Age: 69
Discharge: HOME | End: 2019-05-09
Payer: MEDICARE

## 2019-05-09 PROCEDURE — 95810 POLYSOM 6/> YRS 4/> PARAM: CPT

## 2019-05-09 PROCEDURE — 95811 POLYSOM 6/>YRS CPAP 4/> PARM: CPT

## 2019-05-14 PROCEDURE — 95811 POLYSOM 6/>YRS CPAP 4/> PARM: CPT | Performed by: INTERNAL MEDICINE

## 2019-05-15 ENCOUNTER — TELEPHONE (OUTPATIENT)
Dept: PULMONOLOGY | Age: 69
End: 2019-05-15

## 2019-05-17 ENCOUNTER — TELEPHONE (OUTPATIENT)
Dept: PULMONOLOGY | Age: 69
End: 2019-05-17

## 2019-05-17 NOTE — TELEPHONE ENCOUNTER
Spoke with pt to review split night and order unit. Order to be sent to [facility]. .  F/U scheduled and pt was asked to bring unit.

## 2019-05-17 NOTE — PROGRESS NOTES
Mayi Costello         : 1950    Diagnosis: [x] MC (G47.33) [] CSA (G47.31) [] Apnea (G47.30)   Length of Need: [x] 12 Months [] 99 Months [] Other:    Machine (MEGAN!): [x] Respironics Dream Station      Auto [] ResMed AirSense     Auto [] Other:     []  CPAP () [x] Bilevel ()   Mode: [] Auto [] Spontaneous    Mode: [x] Auto [] Spontaneous                 AUTO BIPAP - Settings (Lacy)  IPAP Max: 25 cmH2O  EPAP Min: 13 cmH2O  Pressure Support Min: 3  Pressure Support Max: 7          Comfort Settings:   - Ramp Pressure: 6 cmH2O                                        - Ramp time: 15 min                                     -  Flex/EPR - 3 full time                                    - For ResMed Bilevel (TiMax-4 sec   TiMin- 0.2 sec)     Humidifier: [x] Heated ()        [x] Water chamber replacement ()/ 1 per 6 months        Mask:   [] Nasal () /1 per 3 months [x] Full Face () /1 per 3 months   [] Patient choice -Size and fit mask [x] Patient Choice - Size and fit mask   [] Dispense:  [] Dispense:    [] Headgear () / 1 per 3 months [x] Headgear () / 1 per 3 months   [] Replacement Nasal Cushion ()/2 per month [x] Interface Replacement ()/1 per month   [] Replacement Nasal Pillows ()/2 per month         Tubing: [x] Heated ()/1 per 3 months    [] Standard ()/1 per 3 months [] Other:           Filters: [x] Non-disposable ()/1 per 6 months     [x] Ultra-Fine, Disposable ()/2 per month        Miscellaneous: [] Chin Strap ()/ 1 per 6 months [] O2 bleed-in:       LPM   [] Oximetry on CPAP/Bilevel []  Other:          Start Order Date: 19    MEDICAL JUSTIFICATION:  I, the undersigned, certify that the above prescribed supplies are medically necessary for this patients wellbeing.   In my opinion, the supplies are both reasonable and necessary in reference to accepted standards of medicalpractice in treatment of this patients condition.     NELA Smith CNP      NPI: [id number]       Order Signed Date: 05/17/19    Electronically signed by NELA Smith CNP on 5/17/2019 at 12:12 PM

## 2019-05-22 DIAGNOSIS — G47.33 OBSTRUCTIVE SLEEP APNEA SYNDROME: Primary | ICD-10-CM

## 2019-06-10 ENCOUNTER — TELEPHONE (OUTPATIENT)
Dept: PULMONOLOGY | Age: 69
End: 2019-06-10

## 2019-06-10 NOTE — TELEPHONE ENCOUNTER
Patient would like to have his orders sent to [facility] in [facility] [phone]  Fax [phone]    I FAXED OVER

## 2019-06-20 ENCOUNTER — TELEPHONE (OUTPATIENT)
Dept: PULMONOLOGY | Age: 69
End: 2019-06-20

## 2019-06-24 ENCOUNTER — OFFICE VISIT (OUTPATIENT)
Dept: CARDIOLOGY CLINIC | Age: 69
End: 2019-06-24
Payer: MEDICARE

## 2019-06-24 VITALS
DIASTOLIC BLOOD PRESSURE: 80 MMHG | WEIGHT: 208.4 LBS | HEART RATE: 63 BPM | SYSTOLIC BLOOD PRESSURE: 125 MMHG | BODY MASS INDEX: 29.9 KG/M2

## 2019-06-24 DIAGNOSIS — I48.91 ATRIAL FIBRILLATION, UNSPECIFIED TYPE (HCC): ICD-10-CM

## 2019-06-24 DIAGNOSIS — R94.31 ABNORMAL EKG: ICD-10-CM

## 2019-06-24 DIAGNOSIS — E78.49 OTHER HYPERLIPIDEMIA: ICD-10-CM

## 2019-06-24 DIAGNOSIS — I49.9 IRREGULAR HEART BEAT: ICD-10-CM

## 2019-06-24 DIAGNOSIS — G47.33 OSA (OBSTRUCTIVE SLEEP APNEA): Primary | ICD-10-CM

## 2019-06-24 DIAGNOSIS — I44.0 FIRST DEGREE AV BLOCK: ICD-10-CM

## 2019-06-24 PROCEDURE — 99214 OFFICE O/P EST MOD 30 MIN: CPT | Performed by: INTERNAL MEDICINE

## 2019-06-24 PROCEDURE — 1123F ACP DISCUSS/DSCN MKR DOCD: CPT | Performed by: INTERNAL MEDICINE

## 2019-06-24 PROCEDURE — G8427 DOCREV CUR MEDS BY ELIG CLIN: HCPCS | Performed by: INTERNAL MEDICINE

## 2019-06-24 PROCEDURE — 4040F PNEUMOC VAC/ADMIN/RCVD: CPT | Performed by: INTERNAL MEDICINE

## 2019-06-24 PROCEDURE — G8417 CALC BMI ABV UP PARAM F/U: HCPCS | Performed by: INTERNAL MEDICINE

## 2019-06-24 PROCEDURE — 3017F COLORECTAL CA SCREEN DOC REV: CPT | Performed by: INTERNAL MEDICINE

## 2019-06-24 PROCEDURE — 1036F TOBACCO NON-USER: CPT | Performed by: INTERNAL MEDICINE

## 2019-06-24 NOTE — PROGRESS NOTES
[facility]    Outpatient Cardiology Consult  Consulting Cardiologist Radha Landeros MD, [facility]    Referring Provider:  Rayray Law [facility]    6/24/2019 06/24/19          Asked by No admitting provider for patient encounter. /REMBERTO AWAN  to evaluate and assess this patient's abnormal EKG    HPI: new pt with abnormal EKG / this was noted during colonoscopy    Today no c/o cp SOB edema, denies PND, MC? ? Had syncopal event over 5 years ago after much testing etiology was unknown   stress test 3/2013 LV EF is 56%  There is a small sized, fixed defect in the inferior wall. This defect is consistent with diaphragm attenuation. Frequent PVC's. Hx HTN / hypokalemia / nonsmoker       PMH:   has a past medical history of Cancer ([address] GERD without esophagitis, Hypertension, and Hypertension, essential.    Surgical History:   has a past surgical history that includes hernia repair; Small intestine surgery; shoulder surgery; Colonoscopy; Cataract removal with implant (08/14/2012); Cataract removal with implant (8/28/2012); shoulder surgery (12-26-13); Upper gastrointestinal endoscopy (04/19/2017); and Upper gastrointestinal endoscopy (06/06/2018). Social History:   reports that he has never smoked. He has never used smokeless tobacco. He reports that he does not drink alcohol or use drugs. Family History:  family history includes High Blood Pressure in his mother. Home Medications:  Prior to Admission medications    Medication Sig Start Date End Date Taking?  Authorizing Provider   potassium chloride (KLOR-CON M) 20 MEQ extended release tablet Take 20 mEq by mouth 2 times daily    Yes Historical Provider, MD   Cholecalciferol (VITAMIN D) 2000 units CAPS capsule Take by mouth daily   Yes Historical Provider, MD   omeprazole (PRILOSEC) 20 MG delayed release capsule Take 20 mg by mouth daily   Yes Historical Provider, MD   amLODIPine (NORVASC) 10 MG tablet Take 10 mg by mouth daily. Yes Historical Provider, MD           Allergies:  Patient has no known allergies. · ROS:   · Cardiovascular: Reviewed in HPI  · Allergic/Immunologic: No nasal congestion or hives. · All other ROS are reviewed and are unremarkable. Physical Examination:        Wt Readings from Last 3 Encounters:   06/24/19 208 lb 6.4 oz (94.5 kg)   05/02/19 208 lb (94.3 kg)   04/23/19 210 lb 9.6 oz (95.5 kg)       BP Readings from Last 3 Encounters:   06/24/19 125/80   05/02/19 122/84   04/23/19 (!) 142/82       Pulse Readings from Last 3 Encounters:   06/24/19 63   05/02/19 73   04/23/19 62       Constitutional and General Appearance:    ENT neg  Skin neg   LUNGS:  HEART and VASCULAR:RRR  abd neg  extremities no edema     Assessment    pt with abnormal EKG / this was noted during colonoscopy    Today no c/o cp SOB edema, denies PND, MC? ? Had syncopal event over 5 years ago after much testing etiology was unknown     stress test 3/2013 LV EF is 56%  There is a small sized, fixed defect in the inferior wall. This defect is consistent with diaphragm attenuation. Frequent PVC's. Hx PVCs  On bystolic    Hx HTN  wnl    hypokalemia   On potassium klor con 20meq daily     MC? ?   Sleep study ordered     EKG today NSR T wave changes in inf ant lat leads   IMAGING FINDINGS:3/27/19   LVEDV:   119ml     (Normal is up to 100ml for women and 150ml for men)   LVEF:   72 %      (Normal is at least 62% for women and 53% for men)   TRANSIENT DILATATION RATIO:    0.72      (Normal is up to 1.3 for women and 1.2 for men)   LV WALL MOTION:   Unremarkable   SPECT PERFUSION IMAGES:   Satisfactory activity throughout the left ventricle myocardium at    stress and at rest       OTHER:  None           IMPRESSION:           No significant abnormality       Plan  Echo to assess LV function & valves   gxt nuc to  Cardiac risk stratify with abnormal EKG    Blood work   Holter 2 week for PVCs   Fu in 3-4 weeks      Thanks for allowing us the opportunity  to participate in the evaluation and care of your patients. Please call if we may assist further [facility]  5/14/227567:94 AM   This patient was seen by me several years ago for evaluation of syncopal episode. We found no etiology. He apparently recently had a colonoscopy during which she had frequent PVCs with some ventricular trigeminy. He had first-degree AV block and significant ST and T wave changes. We are evaluating him at this time. He denied any further episodes of lightheadedness or dizziness or syncope. EG shows significant ST and T wave changes in the inferior and anterolateral T waves area. We will perform an ischemia evaluation with a stress test.  He will have a monitor for 2 weeks. With the first-degree AV block we will reevaluate her for the Bystolic may be contributing. Omer Tabor M.D.  [facility]

## 2019-06-24 NOTE — PROGRESS NOTES
[address]   Dr Andressa Hawk. Efren Springer MD, [facility]    Outpatient Follow Up Note    6/24/2019,12:13 PM  Subjective:   CHIEF COMPLAINT / HPI:  Follow Up secondary to evaluate his abnormal EKG. Malcom Rueda is 79 y.o. male who presents today for a routine follow up. He was seen in March 2019 and evaluation performed. He has done well since his last visit without specific complaints or problems. He did have an EKG that was abnormal noted during his colonoscopy. He has also been found to have obstructive sleep apnea and is being prescribed a CPAP/BiPAP machine. Ejection fraction is borderline at 56%. Stress nuclear was negative for ischemia on March 27, 2019. This patient did have a prolonged first-degree AV block with left atrial enlargement. The echocardiograph is supposed to have been performed. He did have a stress nuclear study showing ejection fraction of 72% and was negative for ischemia. He had been taken by systolic which I am not sure created the problem. We had discussion about Ashleigh's and the [facility] phenom. First-degree AV block. Left atrial enlargement. Hypertension. Hyperlipidemia.   Past Medical History:    Past Medical History:   Diagnosis Date    Cancer ([address]     COLON    GERD without esophagitis 5/2/2019    Hypertension     Hypertension, essential 5/2/2019     Past Surgical History  Past Surgical History:   Procedure Laterality Date    CATARACT REMOVAL WITH IMPLANT  08/14/2012    phaco left eye/ IOL    CATARACT REMOVAL WITH IMPLANT  8/28/2012    Right     COLONOSCOPY      HERNIA REPAIR       TERI    SHOULDER SURGERY      LEFT    SHOULDER SURGERY  12-26-13    BILATERAL LEVATOR ADVANCEMENT, EXCISION OF MULTIPLE NEVIS ON    SMALL INTESTINE SURGERY      UPPER GASTROINTESTINAL ENDOSCOPY  04/19/2017    UPPER GASTROINTESTINAL ENDOSCOPY  06/06/2018    EUS, gastric nodule biopsy     Social History:       Social History Tobacco Use   Smoking Status Never Smoker   Smokeless Tobacco Never Used     Current Medications:  Prior to Visit Medications    Medication Sig Taking? Authorizing Provider   potassium chloride (KLOR-CON M) 20 MEQ extended release tablet Take 20 mEq by mouth 2 times daily  Yes Historical Provider, MD   Cholecalciferol (VITAMIN D) 2000 units CAPS capsule Take by mouth daily Yes Historical Provider, MD   omeprazole (PRILOSEC) 20 MG delayed release capsule Take 20 mg by mouth daily Yes Historical Provider, MD   amLODIPine (NORVASC) 10 MG tablet Take 10 mg by mouth daily. Yes Historical Provider, MD     Family History  Family History   Problem Relation Age of Onset    High Blood Pressure Mother        Current Medications  Current Outpatient Medications   Medication Sig Dispense Refill    potassium chloride (KLOR-CON M) 20 MEQ extended release tablet Take 20 mEq by mouth 2 times daily       Cholecalciferol (VITAMIN D) 2000 units CAPS capsule Take by mouth daily      omeprazole (PRILOSEC) 20 MG delayed release capsule Take 20 mg by mouth daily      amLODIPine (NORVASC) 10 MG tablet Take 10 mg by mouth daily. No current facility-administered medications for this visit. REVIEW OF SYSTEMS:    CONSTITUTIONAL: No major weight gain or loss, fatigue, weakness, night sweats or fever. HEENT: No new vision difficulties or ringing in the ears. RESPIRATORY: No new SOB, PND, orthopnea or cough. CARDIOVASCULAR: See HPI  GI: No nausea, vomiting, diarrhea, constipation, abdominal pain or changes in bowel habits. : No urinary frequency, urgency, incontinence hematuria or dysuria. SKIN: No cyanosis or skin lesions. MUSCULOSKELETAL: No new muscle or joint pain. NEUROLOGICAL: No syncope or TIA-like symptoms.   PSYCHIATRIC: No anxiety, pain, insomnia or depression    Objective:   PHYSICAL EXAM:        VITALS:    Wt Readings from Last 3 Encounters:   06/24/19 208 lb 6.4 oz (94.5 kg)   05/02/19 208 lb (94.3

## 2019-08-06 ENCOUNTER — OFFICE VISIT (OUTPATIENT)
Dept: PULMONOLOGY | Age: 69
End: 2019-08-06
Payer: MEDICARE

## 2019-08-06 VITALS
WEIGHT: 208 LBS | DIASTOLIC BLOOD PRESSURE: 80 MMHG | BODY MASS INDEX: 30.81 KG/M2 | SYSTOLIC BLOOD PRESSURE: 148 MMHG | HEIGHT: 69 IN | HEART RATE: 84 BPM | OXYGEN SATURATION: 98 %

## 2019-08-06 DIAGNOSIS — K21.9 GERD WITHOUT ESOPHAGITIS: Chronic | ICD-10-CM

## 2019-08-06 DIAGNOSIS — I10 HYPERTENSION, ESSENTIAL: Chronic | ICD-10-CM

## 2019-08-06 DIAGNOSIS — G47.33 OBSTRUCTIVE SLEEP APNEA (ADULT) (PEDIATRIC): Primary | ICD-10-CM

## 2019-08-06 PROCEDURE — G8427 DOCREV CUR MEDS BY ELIG CLIN: HCPCS | Performed by: NURSE PRACTITIONER

## 2019-08-06 PROCEDURE — 99214 OFFICE O/P EST MOD 30 MIN: CPT | Performed by: NURSE PRACTITIONER

## 2019-08-06 PROCEDURE — 4040F PNEUMOC VAC/ADMIN/RCVD: CPT | Performed by: NURSE PRACTITIONER

## 2019-08-06 PROCEDURE — 1036F TOBACCO NON-USER: CPT | Performed by: NURSE PRACTITIONER

## 2019-08-06 PROCEDURE — G8417 CALC BMI ABV UP PARAM F/U: HCPCS | Performed by: NURSE PRACTITIONER

## 2019-08-06 PROCEDURE — 3017F COLORECTAL CA SCREEN DOC REV: CPT | Performed by: NURSE PRACTITIONER

## 2019-08-06 PROCEDURE — 1123F ACP DISCUSS/DSCN MKR DOCD: CPT | Performed by: NURSE PRACTITIONER

## 2019-08-06 ASSESSMENT — SLEEP AND FATIGUE QUESTIONNAIRES
HOW LIKELY ARE YOU TO NOD OFF OR FALL ASLEEP WHILE WATCHING TV: 1
HOW LIKELY ARE YOU TO NOD OFF OR FALL ASLEEP WHEN YOU ARE A PASSENGER IN A CAR FOR AN HOUR WITHOUT A BREAK: 0
HOW LIKELY ARE YOU TO NOD OFF OR FALL ASLEEP WHILE SITTING QUIETLY AFTER LUNCH WITHOUT ALCOHOL: 2
HOW LIKELY ARE YOU TO NOD OFF OR FALL ASLEEP WHILE SITTING INACTIVE IN A PUBLIC PLACE: 1
HOW LIKELY ARE YOU TO NOD OFF OR FALL ASLEEP WHILE SITTING AND TALKING TO SOMEONE: 0
HOW LIKELY ARE YOU TO NOD OFF OR FALL ASLEEP WHILE SITTING AND READING: 1
HOW LIKELY ARE YOU TO NOD OFF OR FALL ASLEEP WHILE LYING DOWN TO REST IN THE AFTERNOON WHEN CIRCUMSTANCES PERMIT: 2
ESS TOTAL SCORE: 7
HOW LIKELY ARE YOU TO NOD OFF OR FALL ASLEEP IN A CAR, WHILE STOPPED FOR A FEW MINUTES IN TRAFFIC: 0

## 2019-08-06 ASSESSMENT — ENCOUNTER SYMPTOMS
EYE PAIN: 0
RHINORRHEA: 0
SINUS PRESSURE: 0
ABDOMINAL PAIN: 0
ABDOMINAL DISTENTION: 0
SHORTNESS OF BREATH: 0
EYE REDNESS: 0
APNEA: 0
COUGH: 0

## 2019-08-06 NOTE — PROGRESS NOTES
Worry: Not on file     Inability: Not on file    Transportation needs:     Medical: Not on file     Non-medical: Not on file   Tobacco Use    Smoking status: Never Smoker    Smokeless tobacco: Never Used   Substance and Sexual Activity    Alcohol use: No    Drug use: No    Sexual activity: Not on file   Lifestyle    Physical activity:     Days per week: Not on file     Minutes per session: Not on file    Stress: Not on file   Relationships    Social connections:     Talks on phone: Not on file     Gets together: Not on file     Attends [employer] service: Not on file     Active member of club or organization: Not on file     Attends meetings of clubs or organizations: Not on file     Relationship status: Not on file    Intimate partner violence:     Fear of current or ex partner: Not on file     Emotionally abused: Not on file     Physically abused: Not on file     Forced sexual activity: Not on file   Other Topics Concern    Not on file   Social History Narrative    Not on file       Prior to Admission medications    Medication Sig Start Date End Date Taking? Authorizing Provider   Cholecalciferol (VITAMIN D) 2000 units CAPS capsule Take by mouth daily   Yes Historical Provider, MD   omeprazole (PRILOSEC) 20 MG delayed release capsule Take 20 mg by mouth daily   Yes Historical Provider, MD   amLODIPine (NORVASC) 10 MG tablet Take 10 mg by mouth daily.      Yes Historical Provider, MD       Allergies as of 08/06/2019    (No Known Allergies)       Patient Active Problem List   Diagnosis    Abnormal EKG    Other hyperlipidemia    Bradycardia    GERD without esophagitis    Hypertension, essential    Obstructive sleep apnea (adult) (pediatric)       Past Medical History:   Diagnosis Date    [address] ([facility] [address]     COLON    GERD without esophagitis 5/2/2019    Hypertension     Hypertension, essential 5/2/2019       Past Surgical History:   Procedure Laterality Date    CATARACT REMOVAL WITH IMPLANT

## 2019-08-06 NOTE — ASSESSMENT & PLAN NOTE
New with treatment     Reviewed compliance download with pt. Supplies and parts as needed for his machine. These are medically necessary. Continue medications per his PCP and other physicians. Limit caffeine use after 3pm.  Encouraged him to work on weight loss through diet and exercise. Diagnoses of Hypertension, essential and GERD without esophagitis were pertinent to this visit. The chronic medical conditions listed are directly related to the primary diagnosis listed above. The management of the primary diagnosis affects the secondary diagnosis and vice versa.

## 2019-09-09 ENCOUNTER — OFFICE VISIT (OUTPATIENT)
Dept: PULMONOLOGY | Age: 69
End: 2019-09-09
Payer: MEDICARE

## 2019-09-09 VITALS
OXYGEN SATURATION: 98 % | BODY MASS INDEX: 30.81 KG/M2 | WEIGHT: 208 LBS | HEART RATE: 72 BPM | HEIGHT: 69 IN | SYSTOLIC BLOOD PRESSURE: 142 MMHG | DIASTOLIC BLOOD PRESSURE: 80 MMHG

## 2019-09-09 DIAGNOSIS — G47.33 OBSTRUCTIVE SLEEP APNEA (ADULT) (PEDIATRIC): Primary | ICD-10-CM

## 2019-09-09 DIAGNOSIS — I10 HYPERTENSION, ESSENTIAL: Chronic | ICD-10-CM

## 2019-09-09 DIAGNOSIS — K21.9 GERD WITHOUT ESOPHAGITIS: Chronic | ICD-10-CM

## 2019-09-09 DIAGNOSIS — E66.9 CLASS 1 OBESITY WITHOUT SERIOUS COMORBIDITY WITH BODY MASS INDEX (BMI) OF 30.0 TO 30.9 IN ADULT, UNSPECIFIED OBESITY TYPE: ICD-10-CM

## 2019-09-09 PROBLEM — E66.811 CLASS 1 OBESITY WITHOUT SERIOUS COMORBIDITY WITH BODY MASS INDEX (BMI) OF 30.0 TO 30.9 IN ADULT: Status: ACTIVE | Noted: 2019-09-09

## 2019-09-09 PROCEDURE — 99214 OFFICE O/P EST MOD 30 MIN: CPT | Performed by: NURSE PRACTITIONER

## 2019-09-09 PROCEDURE — G8417 CALC BMI ABV UP PARAM F/U: HCPCS | Performed by: NURSE PRACTITIONER

## 2019-09-09 PROCEDURE — 1036F TOBACCO NON-USER: CPT | Performed by: NURSE PRACTITIONER

## 2019-09-09 PROCEDURE — 1123F ACP DISCUSS/DSCN MKR DOCD: CPT | Performed by: NURSE PRACTITIONER

## 2019-09-09 PROCEDURE — 4040F PNEUMOC VAC/ADMIN/RCVD: CPT | Performed by: NURSE PRACTITIONER

## 2019-09-09 PROCEDURE — 3017F COLORECTAL CA SCREEN DOC REV: CPT | Performed by: NURSE PRACTITIONER

## 2019-09-09 PROCEDURE — G8427 DOCREV CUR MEDS BY ELIG CLIN: HCPCS | Performed by: NURSE PRACTITIONER

## 2019-09-09 ASSESSMENT — SLEEP AND FATIGUE QUESTIONNAIRES
HOW LIKELY ARE YOU TO NOD OFF OR FALL ASLEEP IN A CAR, WHILE STOPPED FOR A FEW MINUTES IN TRAFFIC: 0
HOW LIKELY ARE YOU TO NOD OFF OR FALL ASLEEP WHILE SITTING QUIETLY AFTER LUNCH WITHOUT ALCOHOL: 2
HOW LIKELY ARE YOU TO NOD OFF OR FALL ASLEEP WHILE SITTING INACTIVE IN A PUBLIC PLACE: 0
ESS TOTAL SCORE: 5
HOW LIKELY ARE YOU TO NOD OFF OR FALL ASLEEP WHILE WATCHING TV: 0
HOW LIKELY ARE YOU TO NOD OFF OR FALL ASLEEP WHEN YOU ARE A PASSENGER IN A CAR FOR AN HOUR WITHOUT A BREAK: 0
HOW LIKELY ARE YOU TO NOD OFF OR FALL ASLEEP WHILE LYING DOWN TO REST IN THE AFTERNOON WHEN CIRCUMSTANCES PERMIT: 2
HOW LIKELY ARE YOU TO NOD OFF OR FALL ASLEEP WHILE SITTING AND READING: 0
HOW LIKELY ARE YOU TO NOD OFF OR FALL ASLEEP WHILE SITTING AND TALKING TO SOMEONE: 1

## 2019-09-09 ASSESSMENT — ENCOUNTER SYMPTOMS
ABDOMINAL DISTENTION: 0
SHORTNESS OF BREATH: 0
EYE PAIN: 0
APNEA: 0
EYE REDNESS: 0
ABDOMINAL PAIN: 0
SINUS PRESSURE: 0
COUGH: 0
RHINORRHEA: 0

## 2019-09-09 NOTE — LETTER
medical conditions listed are directly related to the primary diagnosis listed above. The management of the primary diagnosis affects the secondary diagnosis and vice versa. If you have questions or concerns, please do not hesitate to call me. I look forward to following Mayi along with you.     Sincerely,    NELA Hirsch - CNP    CC providers:  Imelda Espino MD  [address] [place] [address] Avenue: [phone]

## 2019-09-09 NOTE — PROGRESS NOTES
Elliott Hernandez MD, FAASM, [facility]  Jackson Tinsley, MSN, RN, CNP     [address] [facility] SLEEP MEDICINE  [address] [facility]  [place] [address]  Dept: [phone]  Dept Fax: : [phone] [facility],[address] [address] [phone]    Subjective:     Patient ID: Audie Wright is a 81 y.o. male. Chief Complaint   Patient presents with    Sleep Apnea       HPI:      Machine Present today:  Yes    Machine Modem/Download Info:  Compliance (hours/night): 7 hrs/night  Download AHI (/hour): 21.1 /HR     Average IPAP Pressure: 16 cmH2O  Average EPAP Pressure: 13 cmH2O         AUTO BIPAP - Settings (Lacy)  IPAP Max: 25 cmH2O  EPAP Min: 13 cmH2O  Pressure Support Min: 3  Pressure Support Max: 7             Comfort Settings  Humidity Level (0-8): 3  Heated Tubing (Yes/No): Yes  Flex/EPR (0-3): 2 PAP Mask  Mask Type: Full Face mask  Mask Model: Dream Wear  Mask Size: MW     Burnham - Total score: 5    Follow-up :     Last Visit : August 2019      Patient reports the listed chronic Co-morbidities: Obesity, GERD, HTN,    are well controlled and stable at this time. Subjective Health Changes: None     Over Night Oximetry: [] Yes  [] No  [x] NA   Using O2: [] Yes  [] No  [x] NA   Patient is compliant with the machine  [x] Yes  [] No   Feeling rested when using the machine   [x] Yes  [] No     Pressure is comfortable with inspiration and expiration  [x] Yes  [] No     Noticed changes in pressure   [x] Yes  [] No  [] NA   Mask is fitting well  [x] Yes  [] No   Noting Mask Air Leak  [x] Yes  [] No   Having painful Aerophagia  [] Yes  [x] No   Nocturia   3  per night.    Having  HA upon waking  [] Yes  [x] No   Dry mouth upon waking   Dry Nose  Dry Eyes  [] Yes  [x] No   Congestion upon waking   [] Yes  [x] No    Nose Bleeds  [] Yes  [x] No   Using Sleep Aides    [x] NA Inability: Not on file    Transportation needs:     Medical: Not on file     Non-medical: Not on file   Tobacco Use    Smoking status: Never Smoker    Smokeless tobacco: Never Used   Substance and Sexual Activity    Alcohol use: No    Drug use: No    Sexual activity: Not on file   Lifestyle    Physical activity:     Days per week: Not on file     Minutes per session: Not on file    Stress: Not on file   Relationships    Social connections:     Talks on phone: Not on file     Gets together: Not on file     Attends Yazdanism service: Not on file     Active member of club or organization: Not on file     Attends meetings of clubs or organizations: Not on file     Relationship status: Not on file    Intimate partner violence:     Fear of current or ex partner: Not on file     Emotionally abused: Not on file     Physically abused: Not on file     Forced sexual activity: Not on file   Other Topics Concern    Not on file   Social History Narrative    Not on file       Prior to Admission medications    Medication Sig Start Date End Date Taking? Authorizing Provider   Cholecalciferol (VITAMIN D) 2000 units CAPS capsule Take by mouth daily   Yes Historical Provider, MD   omeprazole (PRILOSEC) 20 MG delayed release capsule Take 20 mg by mouth daily   Yes Historical Provider, MD   amLODIPine (NORVASC) 10 MG tablet Take 10 mg by mouth daily.      Yes Historical Provider, MD       Allergies as of 09/09/2019    (No Known Allergies)       Patient Active Problem List   Diagnosis    Abnormal EKG    Other hyperlipidemia    Bradycardia    GERD without esophagitis    Hypertension, essential    Obstructive sleep apnea (adult) (pediatric)    Syncope    Class 1 obesity without serious comorbidity with body mass index (BMI) of 30.0 to 30.9 in adult       Past Medical History:   Diagnosis Date    [facility]     COLON    GERD without esophagitis 5/2/2019    Hypertension     Hypertension, essential 5/2/2019       Past Surgical History:   Procedure Laterality Date    CATARACT REMOVAL WITH IMPLANT  08/14/2012    phaco left eye/ IOL    CATARACT REMOVAL WITH IMPLANT  8/28/2012    Right     COLONOSCOPY      HERNIA REPAIR       TERI    SHOULDER SURGERY      LEFT    SHOULDER SURGERY  12-26-13    BILATERAL LEVATOR ADVANCEMENT, EXCISION OF MULTIPLE NEVIS ON    SMALL INTESTINE SURGERY      UPPER GASTROINTESTINAL ENDOSCOPY  04/19/2017    UPPER GASTROINTESTINAL ENDOSCOPY  06/06/2018    EUS, gastric nodule biopsy       Family History   Problem Relation Age of Onset    High Blood Pressure Mother        Vitals:  Weight BMI   Wt Readings from Last 3 Encounters:   09/09/19 208 lb (94.3 kg)   08/06/19 208 lb (94.3 kg)   06/24/19 208 lb 6.4 oz (94.5 kg)    Body mass index is 30.72 kg/m². BP HR SaO2   BP Readings from Last 3 Encounters:   09/09/19 (!) 142/80   08/06/19 (!) 148/80   06/24/19 125/80    Pulse Readings from Last 3 Encounters:   09/09/19 72   08/06/19 84   06/24/19 63    SpO2 Readings from Last 3 Encounters:   09/09/19 98%   08/06/19 98%   05/02/19 97%        Assessment/Plan:     GERD without esophagitis  Chronic- Stable. Cont meds per PCP and other physicians. Hypertension, essential  Chronic- Stable. Cont meds per PCP and other physicians. Class 1 obesity without serious comorbidity with body mass index (BMI) of 30.0 to 30.9 in adult  Reviewed compliance download with pt. Supplies and parts as needed for his machine. These are medically necessary. Continue medications per his PCP and other physicians. Limit caffeine use after 3pm.  Encouraged him to work on weight loss through diet and exercise. Diagnoses of GERD without esophagitis, Hypertension, essential, and Class 1 obesity without serious comorbidity with body mass index (BMI) of 30.0 to 30.9 in adult, unspecified obesity type were pertinent to this visit.   The chronic medical conditions listed are directly related to the primary diagnosis listed

## 2019-12-02 ENCOUNTER — OFFICE VISIT (OUTPATIENT)
Dept: CARDIOLOGY CLINIC | Age: 69
End: 2019-12-02
Payer: MEDICARE

## 2019-12-02 VITALS
WEIGHT: 211 LBS | DIASTOLIC BLOOD PRESSURE: 81 MMHG | HEART RATE: 78 BPM | BODY MASS INDEX: 31.16 KG/M2 | SYSTOLIC BLOOD PRESSURE: 140 MMHG

## 2019-12-02 DIAGNOSIS — R00.1 BRADYCARDIA: ICD-10-CM

## 2019-12-02 DIAGNOSIS — G47.33 OBSTRUCTIVE SLEEP APNEA (ADULT) (PEDIATRIC): ICD-10-CM

## 2019-12-02 DIAGNOSIS — I10 HYPERTENSION, ESSENTIAL: Primary | Chronic | ICD-10-CM

## 2019-12-02 PROCEDURE — 3017F COLORECTAL CA SCREEN DOC REV: CPT | Performed by: INTERNAL MEDICINE

## 2019-12-02 PROCEDURE — 4040F PNEUMOC VAC/ADMIN/RCVD: CPT | Performed by: INTERNAL MEDICINE

## 2019-12-02 PROCEDURE — 99214 OFFICE O/P EST MOD 30 MIN: CPT | Performed by: INTERNAL MEDICINE

## 2019-12-02 PROCEDURE — G8484 FLU IMMUNIZE NO ADMIN: HCPCS | Performed by: INTERNAL MEDICINE

## 2019-12-02 PROCEDURE — 1123F ACP DISCUSS/DSCN MKR DOCD: CPT | Performed by: INTERNAL MEDICINE

## 2019-12-02 PROCEDURE — G8427 DOCREV CUR MEDS BY ELIG CLIN: HCPCS | Performed by: INTERNAL MEDICINE

## 2019-12-02 PROCEDURE — 1036F TOBACCO NON-USER: CPT | Performed by: INTERNAL MEDICINE

## 2019-12-02 PROCEDURE — G8417 CALC BMI ABV UP PARAM F/U: HCPCS | Performed by: INTERNAL MEDICINE

## 2019-12-02 RX ORDER — SPIRONOLACTONE 25 MG/1
25 TABLET ORAL
COMMUNITY
Start: 2019-11-07 | End: 2021-03-16

## 2019-12-02 RX ORDER — FERROUS SULFATE 325(65) MG
325 TABLET ORAL
COMMUNITY

## 2019-12-05 ENCOUNTER — HOSPITAL ENCOUNTER (OUTPATIENT)
Age: 69
Discharge: HOME | End: 2019-12-05
Payer: MEDICARE

## 2019-12-05 DIAGNOSIS — G47.33 OBSTRUCTIVE SLEEP APNEA (ADULT) (PEDIATRIC): ICD-10-CM

## 2019-12-05 DIAGNOSIS — I10 HYPERTENSION, ESSENTIAL: Chronic | ICD-10-CM

## 2019-12-05 DIAGNOSIS — R00.1 BRADYCARDIA: ICD-10-CM

## 2019-12-05 LAB
A/G RATIO: 1.1 (ref 1.1–2.2)
ALBUMIN SERPL-MCNC: 4.1 G/DL (ref 3.4–5)
ALP BLD-CCNC: 84 U/L (ref 40–129)
ALT SERPL-CCNC: 25 U/L (ref 10–40)
ANION GAP SERPL CALCULATED.3IONS-SCNC: 12 MMOL/L (ref 3–16)
AST SERPL-CCNC: 20 U/L (ref 15–37)
BILIRUB SERPL-MCNC: 0.6 MG/DL (ref 0–1)
BILIRUBIN DIRECT: <0.2 MG/DL (ref 0–0.3)
BILIRUBIN, INDIRECT: NORMAL MG/DL (ref 0–1)
BUN BLDV-MCNC: 22 MG/DL (ref 7–20)
CALCIUM SERPL-MCNC: 9.8 MG/DL (ref 8.3–10.6)
CHLORIDE BLD-SCNC: 104 MMOL/L (ref 99–110)
CHOLESTEROL, TOTAL: 197 MG/DL (ref 0–199)
CO2: 26 MMOL/L (ref 21–32)
CREAT SERPL-MCNC: 1.5 MG/DL (ref 0.8–1.3)
GFR AFRICAN AMERICAN: 56
GFR NON-AFRICAN AMERICAN: 47
GLOBULIN: 3.8 G/DL
GLUCOSE BLD-MCNC: 93 MG/DL (ref 70–99)
HCT VFR BLD CALC: 41.3 % (ref 40.5–52.5)
HDLC SERPL-MCNC: 61 MG/DL (ref 40–60)
HEMOGLOBIN: 13.7 G/DL (ref 13.5–17.5)
LDL CHOLESTEROL CALCULATED: 120 MG/DL
MAGNESIUM: 2.3 MG/DL (ref 1.8–2.4)
MCH RBC QN AUTO: 29.5 PG (ref 26–34)
MCHC RBC AUTO-ENTMCNC: 33 G/DL (ref 31–36)
MCV RBC AUTO: 89.3 FL (ref 80–100)
PDW BLD-RTO: 13.9 % (ref 12.4–15.4)
PLATELET # BLD: 183 K/UL (ref 135–450)
PMV BLD AUTO: 8.9 FL (ref 5–10.5)
POTASSIUM SERPL-SCNC: 4.6 MMOL/L (ref 3.5–5.1)
RBC # BLD: 4.63 M/UL (ref 4.2–5.9)
SODIUM BLD-SCNC: 142 MMOL/L (ref 136–145)
TOTAL PROTEIN: 7.9 G/DL (ref 6.4–8.2)
TRIGL SERPL-MCNC: 78 MG/DL (ref 0–150)
TSH REFLEX FT4: 1.37 UIU/ML (ref 0.27–4.2)
VITAMIN D 25-HYDROXY: 38.2 NG/ML
VLDLC SERPL CALC-MCNC: 16 MG/DL
WBC # BLD: 6.7 K/UL (ref 4–11)

## 2019-12-05 PROCEDURE — 36415 COLL VENOUS BLD VENIPUNCTURE: CPT

## 2019-12-05 PROCEDURE — 83735 ASSAY OF MAGNESIUM: CPT

## 2019-12-05 PROCEDURE — 85027 COMPLETE CBC AUTOMATED: CPT

## 2019-12-05 PROCEDURE — 80053 COMPREHEN METABOLIC PANEL: CPT

## 2019-12-05 PROCEDURE — 80061 LIPID PANEL: CPT

## 2019-12-05 PROCEDURE — 82248 BILIRUBIN DIRECT: CPT

## 2019-12-05 PROCEDURE — 82306 VITAMIN D 25 HYDROXY: CPT

## 2019-12-05 PROCEDURE — 84443 ASSAY THYROID STIM HORMONE: CPT

## 2019-12-23 ENCOUNTER — TELEPHONE (OUTPATIENT)
Dept: PULMONOLOGY | Age: 69
End: 2019-12-23

## 2019-12-23 ENCOUNTER — OFFICE VISIT (OUTPATIENT)
Dept: PULMONOLOGY | Age: 69
End: 2019-12-23
Payer: MEDICARE

## 2019-12-23 VITALS
OXYGEN SATURATION: 99 % | BODY MASS INDEX: 31.99 KG/M2 | SYSTOLIC BLOOD PRESSURE: 130 MMHG | DIASTOLIC BLOOD PRESSURE: 82 MMHG | HEIGHT: 69 IN | WEIGHT: 216 LBS | HEART RATE: 79 BPM

## 2019-12-23 DIAGNOSIS — I10 HYPERTENSION, ESSENTIAL: Chronic | ICD-10-CM

## 2019-12-23 DIAGNOSIS — K21.9 GERD WITHOUT ESOPHAGITIS: Chronic | ICD-10-CM

## 2019-12-23 DIAGNOSIS — G47.33 OBSTRUCTIVE SLEEP APNEA (ADULT) (PEDIATRIC): Chronic | ICD-10-CM

## 2019-12-23 DIAGNOSIS — E66.9 CLASS 1 OBESITY WITHOUT SERIOUS COMORBIDITY WITH BODY MASS INDEX (BMI) OF 30.0 TO 30.9 IN ADULT, UNSPECIFIED OBESITY TYPE: Chronic | ICD-10-CM

## 2019-12-23 PROBLEM — E66.811 CLASS 1 OBESITY WITHOUT SERIOUS COMORBIDITY WITH BODY MASS INDEX (BMI) OF 30.0 TO 30.9 IN ADULT: Chronic | Status: ACTIVE | Noted: 2019-09-09

## 2019-12-23 PROCEDURE — G8417 CALC BMI ABV UP PARAM F/U: HCPCS | Performed by: INTERNAL MEDICINE

## 2019-12-23 PROCEDURE — 4040F PNEUMOC VAC/ADMIN/RCVD: CPT | Performed by: INTERNAL MEDICINE

## 2019-12-23 PROCEDURE — 1123F ACP DISCUSS/DSCN MKR DOCD: CPT | Performed by: INTERNAL MEDICINE

## 2019-12-23 PROCEDURE — 3017F COLORECTAL CA SCREEN DOC REV: CPT | Performed by: INTERNAL MEDICINE

## 2019-12-23 PROCEDURE — 1036F TOBACCO NON-USER: CPT | Performed by: INTERNAL MEDICINE

## 2019-12-23 PROCEDURE — G8427 DOCREV CUR MEDS BY ELIG CLIN: HCPCS | Performed by: INTERNAL MEDICINE

## 2019-12-23 PROCEDURE — 99214 OFFICE O/P EST MOD 30 MIN: CPT | Performed by: INTERNAL MEDICINE

## 2019-12-23 PROCEDURE — G8484 FLU IMMUNIZE NO ADMIN: HCPCS | Performed by: INTERNAL MEDICINE

## 2019-12-23 ASSESSMENT — SLEEP AND FATIGUE QUESTIONNAIRES
HOW LIKELY ARE YOU TO NOD OFF OR FALL ASLEEP WHEN YOU ARE A PASSENGER IN A CAR FOR AN HOUR WITHOUT A BREAK: 0
ESS TOTAL SCORE: 5
HOW LIKELY ARE YOU TO NOD OFF OR FALL ASLEEP WHILE SITTING AND TALKING TO SOMEONE: 0
HOW LIKELY ARE YOU TO NOD OFF OR FALL ASLEEP WHILE WATCHING TV: 1
HOW LIKELY ARE YOU TO NOD OFF OR FALL ASLEEP WHILE SITTING QUIETLY AFTER LUNCH WITHOUT ALCOHOL: 1
HOW LIKELY ARE YOU TO NOD OFF OR FALL ASLEEP WHILE SITTING AND READING: 1
HOW LIKELY ARE YOU TO NOD OFF OR FALL ASLEEP WHILE LYING DOWN TO REST IN THE AFTERNOON WHEN CIRCUMSTANCES PERMIT: 1
HOW LIKELY ARE YOU TO NOD OFF OR FALL ASLEEP WHILE SITTING INACTIVE IN A PUBLIC PLACE: 1
HOW LIKELY ARE YOU TO NOD OFF OR FALL ASLEEP IN A CAR, WHILE STOPPED FOR A FEW MINUTES IN TRAFFIC: 0

## 2019-12-23 ASSESSMENT — ENCOUNTER SYMPTOMS
SINUS PRESSURE: 0
SHORTNESS OF BREATH: 0
PHOTOPHOBIA: 0
STRIDOR: 0
EYE PAIN: 0
CHEST TIGHTNESS: 0

## 2020-05-01 ENCOUNTER — TELEPHONE (OUTPATIENT)
Dept: PULMONOLOGY | Age: 70
End: 2020-05-01

## 2020-05-11 ENCOUNTER — VIRTUAL VISIT (OUTPATIENT)
Dept: PULMONOLOGY | Age: 70
End: 2020-05-11
Payer: MEDICARE

## 2020-05-11 PROCEDURE — 3017F COLORECTAL CA SCREEN DOC REV: CPT | Performed by: NURSE PRACTITIONER

## 2020-05-11 PROCEDURE — G8427 DOCREV CUR MEDS BY ELIG CLIN: HCPCS | Performed by: NURSE PRACTITIONER

## 2020-05-11 PROCEDURE — 1123F ACP DISCUSS/DSCN MKR DOCD: CPT | Performed by: NURSE PRACTITIONER

## 2020-05-11 PROCEDURE — 4040F PNEUMOC VAC/ADMIN/RCVD: CPT | Performed by: NURSE PRACTITIONER

## 2020-05-11 PROCEDURE — 99214 OFFICE O/P EST MOD 30 MIN: CPT | Performed by: NURSE PRACTITIONER

## 2020-05-11 ASSESSMENT — SLEEP AND FATIGUE QUESTIONNAIRES
HOW LIKELY ARE YOU TO NOD OFF OR FALL ASLEEP WHILE SITTING AND READING: 1
HOW LIKELY ARE YOU TO NOD OFF OR FALL ASLEEP WHILE SITTING QUIETLY AFTER LUNCH WITHOUT ALCOHOL: 0
HOW LIKELY ARE YOU TO NOD OFF OR FALL ASLEEP WHILE WATCHING TV: 1
HOW LIKELY ARE YOU TO NOD OFF OR FALL ASLEEP WHILE SITTING INACTIVE IN A PUBLIC PLACE: 0
HOW LIKELY ARE YOU TO NOD OFF OR FALL ASLEEP WHILE SITTING AND TALKING TO SOMEONE: 0
ESS TOTAL SCORE: 3
HOW LIKELY ARE YOU TO NOD OFF OR FALL ASLEEP WHEN YOU ARE A PASSENGER IN A CAR FOR AN HOUR WITHOUT A BREAK: 0
HOW LIKELY ARE YOU TO NOD OFF OR FALL ASLEEP WHILE LYING DOWN TO REST IN THE AFTERNOON WHEN CIRCUMSTANCES PERMIT: 1
HOW LIKELY ARE YOU TO NOD OFF OR FALL ASLEEP IN A CAR, WHILE STOPPED FOR A FEW MINUTES IN TRAFFIC: 0

## 2020-05-11 NOTE — PROGRESS NOTES
Maris Pitts MD, FAASM, [facility]  Ly Howard, MSN, RN, CNP     [address] [place] [address] SLEEP MEDICINE  [address] [place] [address]  Dept: [phone]  Dept Fax: : [phone],[address] MEDICINE  [address] [place] [address] [phone]    Subjective:     Patient ID: Meena Dumont is a 81 y.o. male. Chief Complaint   Patient presents with    Sleep Apnea       HPI:      Sleep Medicine Video Visit    Pursuant to the emergency declaration under the [facility], [facility] waiver authority and the [employer] Act this Telephone Visit was insisted, with patient's consent, to reduce the patient's risk of exposure to COVID-19 and provide continuity of care for an established patient. Services were provided through a synchronous discussion over a telephone and/or Video chat to substitute for in-person clinic visit, and coded as such. Machine Modem/Download Info:  Compliance (hours/night): 8 hrs/night  Download AHI (/hour): 13.7 /HR     Average IPAP Pressure: 12.3 cmH2O  Average EPAP Pressure: 8.8 cmH2O         AUTO BIPAP - Settings (Lacy)  IPAP Max: 25 cmH2O  EPAP Min: 8 cmH2O  Pressure Support Min: 3  Pressure Support Max: 7             Comfort Settings  Humidity Level (0-8): 2  Flex/EPR (0-3): 2 PAP Mask  Mask Type: Full Face mask     Warrior - Total score: 3    Follow-up :     Last Visit : December 2019      Patient reports the listed chronic Co-morbidities: HTN, GERD, Obeisty    are well controlled and stable at this time.      Subjective Health Changes: none      Over Night Oximetry: [] Yes  [] No  [x] NA [] WNL   Using O2: [] Yes  [] No  [x] NA   Patient is compliant with the machine  [x] Yes  [] No   Feeling rested when using the machine   [x] Yes  [] No     Pressure is comfortable with inspiration and expiration  [x] Yes  [] No     Noticed changes in pressure   [x] Yes  [] No  [] NA   Mask is fitting well  [x] Yes  [] No   Noting Mask Air Leak  [x] Yes  [] No   Having painful Aerophagia  [] Yes  [x] No   Nocturia   2  per night. Having  HA upon waking  [] Yes  [x] No   Dry mouth upon waking   Dry Nose  Dry Eyes  [x] Yes  [] No   Congestion upon waking   [] Yes  [x] No    Nose Bleeds  [] Yes  [x] No   Using Sleep Aides    [x] NA   Understands how to change humidification and/or tubing temperature for comfort while at home  [x] Yes  [] No     Difficulties falling asleep  [] Yes  [x] No   Difficulties staying asleep  [] Yes  [x] No   Approximate time to bed  9pm   Approximate wake time  5-9am   Taking Naps  no   If taking naps usual length    [x] NA   If taking naps using the machine  [] Yes  [] No  [x] NA [] With and With out    Drowsy when driving  [] Yes  [x] No     Does patient carry a DOT/CDL  [] Yes  [x] No     Does patient carry FAA/Pilots License   [] Yes  [x] No      Any concerns noted with the machine at this time  [] Yes  [x] No        Diagnosis Orders   1. Hypertension, essential     2. GERD without esophagitis     3. Class 1 obesity without serious comorbidity with body mass index (BMI) of 30.0 to 30.9 in adult, unspecified obesity type     4. Obstructive sleep apnea (adult) (pediatric)         The chronic medical conditions listed are directly related to the primary diagnosis listed above. The management of the primary diagnosis affects the secondary diagnosis and vice versa. Assessment/Plan:     Hypertension, essential  Chronic- Stable. Cont meds per PCP and other physicians. GERD without esophagitis  Chronic- Stable. Cont meds per PCP and other physicians. Class 1 obesity without serious comorbidity with body mass index (BMI) of 30.0 to 30.9 in adult  Chronic-Stable. Encouraged him to work on weight loss through diet and exercise.       Obstructive sleep apnea

## 2020-07-02 ENCOUNTER — OFFICE VISIT (OUTPATIENT)
Dept: CARDIOLOGY CLINIC | Age: 70
End: 2020-07-02
Payer: MEDICARE

## 2020-07-02 VITALS
WEIGHT: 216 LBS | BODY MASS INDEX: 31.9 KG/M2 | SYSTOLIC BLOOD PRESSURE: 134 MMHG | TEMPERATURE: 98.2 F | DIASTOLIC BLOOD PRESSURE: 80 MMHG | HEART RATE: 75 BPM

## 2020-07-02 PROCEDURE — G8427 DOCREV CUR MEDS BY ELIG CLIN: HCPCS | Performed by: INTERNAL MEDICINE

## 2020-07-02 PROCEDURE — 1123F ACP DISCUSS/DSCN MKR DOCD: CPT | Performed by: INTERNAL MEDICINE

## 2020-07-02 PROCEDURE — 93000 ELECTROCARDIOGRAM COMPLETE: CPT | Performed by: INTERNAL MEDICINE

## 2020-07-02 PROCEDURE — 99214 OFFICE O/P EST MOD 30 MIN: CPT | Performed by: INTERNAL MEDICINE

## 2020-07-02 PROCEDURE — G8417 CALC BMI ABV UP PARAM F/U: HCPCS | Performed by: INTERNAL MEDICINE

## 2020-07-02 PROCEDURE — 1036F TOBACCO NON-USER: CPT | Performed by: INTERNAL MEDICINE

## 2020-07-02 PROCEDURE — 3017F COLORECTAL CA SCREEN DOC REV: CPT | Performed by: INTERNAL MEDICINE

## 2020-07-02 PROCEDURE — 4040F PNEUMOC VAC/ADMIN/RCVD: CPT | Performed by: INTERNAL MEDICINE

## 2020-07-02 NOTE — PROGRESS NOTES
[address]  Office Visit           Robbie Prader MD,  [facility] [place]                      Cardiology           Butternut Zeke  1950 July 2, 2020    Primary Cardiologist: Waqas Cherry     This patient is here today and doing extremely well. He is very happy. He is on this CPAP for his obstructive sleep apnea and his days are wonderful. No chest pains no shortness of breath no dyspnea no blackout spells. He also has first-degree and some second-degree AV block. He was seen by Dr. Michaela Borja last April 2019 for EP study and no recommendations were made at that time. This point he looks great his EKG still shows first-degree AV block with some nonspecific ST and T wave changes. We will continue to monitor him for symptoms and will probably next year do a longer event recorder. Obstructive sleep apnea on CPAP  Hypertension  Chronic renal failure creatinine 1.5  First-degree AV block on EKG  HLD optimal  GERD stable       complaint with meds   Discussed nutrition / sodium intake / fluid intake   Recommend activity as tolerated     Reviewed most recent test with pt. / stress nuclear study showing ejection fraction of 72% and was negative for ischemia       Review of Systems:  Constitutional: Denies  fatigue, weakness, night sweats or fever. HEENT: Denies new visual changes, ringing in ears, nosebleeds,nasal congestion  Respiratory: Denies new or change in SOB, PND, orthopnea or cough. Cardiovascular: see HPI  GI: Denies N/V, diarrhea, constipation, abdominal pain, change in bowel habits, melena or hematochezia  : Denies urinary frequency, urgency, incontinence, hematuria or dysuria. Skin: Denies rash, hives, or cyanosis  Musculoskeletal: Denies joint or muscle aches/pain  Neurological: Denies syncope or TIA-like symptoms.   Psychiatric: Denies anxiety, insomnia or depression     Past Medical History:   Diagnosis Date    Cancer ([facility] [address]     COLON    GERD without esophagitis 5/2/2019    Hypertension     Hypertension, essential 5/2/2019    Obstructive sleep apnea (adult) (pediatric)      Past Surgical History:   Procedure Laterality Date    CATARACT REMOVAL WITH IMPLANT  08/14/2012    phaco left eye/ IOL    CATARACT REMOVAL WITH IMPLANT  8/28/2012    Right     COLONOSCOPY      HERNIA REPAIR       TERI    SHOULDER SURGERY      LEFT    SHOULDER SURGERY  12-26-13    BILATERAL LEVATOR ADVANCEMENT, EXCISION OF MULTIPLE NEVIS ON    SMALL INTESTINE SURGERY      UPPER GASTROINTESTINAL ENDOSCOPY  04/19/2017    UPPER GASTROINTESTINAL ENDOSCOPY  06/06/2018    EUS, gastric nodule biopsy     Family History   Problem Relation Age of Onset    High Blood Pressure Mother      Social History     Tobacco Use    Smoking status: Never Smoker    Smokeless tobacco: Never Used   Substance Use Topics    Alcohol use: No    Drug use: No       No Known Allergies  Current Outpatient Medications   Medication Sig Dispense Refill    ferrous sulfate 325 (65 Fe) MG tablet Take 325 mg by mouth      spironolactone (ALDACTONE) 25 MG tablet Take 25 mg by mouth      Cholecalciferol (VITAMIN D) 2000 units CAPS capsule Take by mouth daily      omeprazole (PRILOSEC) 20 MG delayed release capsule Take 20 mg by mouth daily      amLODIPine (NORVASC) 10 MG tablet Take 10 mg by mouth daily. No current facility-administered medications for this visit. Physical Exam:   /80   Pulse 75   Temp 98.2 °F (36.8 °C)   Wt 216 lb (98 kg)   BMI 31.90 kg/m²   BP Readings from Last 3 Encounters:   07/02/20 134/80   12/23/19 130/82   12/02/19 (!) 140/81     Pulse Readings from Last 3 Encounters:   07/02/20 75   12/23/19 79   12/02/19 78     Wt Readings from Last 3 Encounters:   07/02/20 216 lb (98 kg)   12/23/19 216 lb (98 kg)   12/02/19 211 lb (95.7 kg)     Constitutional: He is oriented to person, place, and time. He appears well-developed and well-nourished. In no acute distress.    HEENT: Normocephalic and atraumatic. Sclerae anicteric. No xanthelasmas. Conjunctiva white, no subconjunctival hemorrhage   External inspection of ears nose teeth & gums   Eyes:PERRLA EOM's intact. Neck: Neck supple. No JVD present. Carotids without bruits. No mass and no thyromegaly present. No lymphadenopathy present. Cardiovascular: RRR, normal S1 and S2; no murmur/gallop or rub, PMI nondisplaced  Pulmonary/Chest: Effort normal.  Lungs clear to auscultation. Chest wall nontender  Abdominal: soft, nontender, nondistended. + bowel sounds; no organomegaly or bruits. Aorta normal  Extremities: No edema, cyanosis, or clubbing. Pulses are 2+ radial/carotid/dorsalis pedis bilaterally. Cap refill brisk. Neurological: No cranial nerve deficit. Psychiatric: He has a normal mood and affect. His speech is normal and behavior is normal.     Lab Review:   Lab Results   Component Value Date    TRIG 78 12/05/2019    HDL 61 12/05/2019    LDLCALC 120 12/05/2019    LABVLDL 16 12/05/2019     Lab Results   Component Value Date     12/05/2019    K 4.6 12/05/2019    K 3.6 04/26/2019     12/05/2019    CO2 26 12/05/2019    BUN 22 12/05/2019    CREATININE 1.5 12/05/2019    GLUCOSE 93 12/05/2019    CALCIUM 9.8 12/05/2019      Lab Results   Component Value Date    WBC 6.7 12/05/2019    HGB 13.7 12/05/2019    HCT 41.3 12/05/2019    MCV 89.3 12/05/2019     12/05/2019   EKG on  Assessment:    MC  uses CPAP now & states feels better / no c/o cp SOB edema / pleasant / active      HTN   optimal   EKG abnormal 1st degree av block /stable   stress nuclear study showing ejection fraction of 72% and was negative for ischemia. EKG on 7/2/2020 sinus rhythm 71/min. First-degree AV block. 1 PVC. Nonspecific ST and T wave changes.   Hx syncope with drinking much alcohol / has not has any c/o any presyncope or syncope  states was drinking too much & stopped drinking / feels much better     HLD   Optimal    GERD  Stable     Plan:  Fu in 6 months /

## 2020-07-24 ENCOUNTER — HOSPITAL ENCOUNTER (OUTPATIENT)
Dept: CT IMAGING | Age: 70
Discharge: HOME | End: 2020-07-24
Payer: MEDICARE

## 2020-07-24 ENCOUNTER — HOSPITAL ENCOUNTER (OUTPATIENT)
Age: 70
Discharge: HOME | End: 2020-07-24
Payer: MEDICARE

## 2020-07-24 LAB
BUN BLDV-MCNC: 21 MG/DL (ref 7–20)
CREAT SERPL-MCNC: 1.9 MG/DL (ref 0.8–1.3)
GFR AFRICAN AMERICAN: 43
GFR NON-AFRICAN AMERICAN: 35

## 2020-07-24 PROCEDURE — 36415 COLL VENOUS BLD VENIPUNCTURE: CPT

## 2020-07-24 PROCEDURE — 84520 ASSAY OF UREA NITROGEN: CPT

## 2020-07-24 PROCEDURE — 82565 ASSAY OF CREATININE: CPT

## 2020-07-24 PROCEDURE — 74176 CT ABD & PELVIS W/O CONTRAST: CPT

## 2021-03-15 ENCOUNTER — TELEPHONE (OUTPATIENT)
Dept: CARDIOLOGY CLINIC | Age: 71
End: 2021-03-15

## 2021-03-16 ENCOUNTER — OFFICE VISIT (OUTPATIENT)
Dept: CARDIOLOGY CLINIC | Age: 71
End: 2021-03-16
Payer: MEDICARE

## 2021-03-16 VITALS
BODY MASS INDEX: 29.49 KG/M2 | SYSTOLIC BLOOD PRESSURE: 140 MMHG | HEIGHT: 70 IN | HEART RATE: 71 BPM | WEIGHT: 206 LBS | TEMPERATURE: 97.7 F | DIASTOLIC BLOOD PRESSURE: 80 MMHG

## 2021-03-16 DIAGNOSIS — I10 HYPERTENSION, ESSENTIAL: Chronic | ICD-10-CM

## 2021-03-16 DIAGNOSIS — R00.1 BRADYCARDIA: ICD-10-CM

## 2021-03-16 DIAGNOSIS — R94.31 ABNORMAL EKG: Primary | ICD-10-CM

## 2021-03-16 DIAGNOSIS — E66.9 CLASS 1 OBESITY WITHOUT SERIOUS COMORBIDITY WITH BODY MASS INDEX (BMI) OF 30.0 TO 30.9 IN ADULT, UNSPECIFIED OBESITY TYPE: Chronic | ICD-10-CM

## 2021-03-16 PROCEDURE — 1036F TOBACCO NON-USER: CPT | Performed by: NURSE PRACTITIONER

## 2021-03-16 PROCEDURE — 4040F PNEUMOC VAC/ADMIN/RCVD: CPT | Performed by: NURSE PRACTITIONER

## 2021-03-16 PROCEDURE — 93000 ELECTROCARDIOGRAM COMPLETE: CPT | Performed by: NURSE PRACTITIONER

## 2021-03-16 PROCEDURE — 1123F ACP DISCUSS/DSCN MKR DOCD: CPT | Performed by: NURSE PRACTITIONER

## 2021-03-16 PROCEDURE — G8484 FLU IMMUNIZE NO ADMIN: HCPCS | Performed by: NURSE PRACTITIONER

## 2021-03-16 PROCEDURE — G8427 DOCREV CUR MEDS BY ELIG CLIN: HCPCS | Performed by: NURSE PRACTITIONER

## 2021-03-16 PROCEDURE — 99214 OFFICE O/P EST MOD 30 MIN: CPT | Performed by: NURSE PRACTITIONER

## 2021-03-16 PROCEDURE — G8417 CALC BMI ABV UP PARAM F/U: HCPCS | Performed by: NURSE PRACTITIONER

## 2021-03-16 PROCEDURE — 3017F COLORECTAL CA SCREEN DOC REV: CPT | Performed by: NURSE PRACTITIONER

## 2021-03-16 NOTE — PROGRESS NOTES
[facility]     Outpatient Follow Up Note  CHIEF COMPLAINT / HPI:  Follow Up     Subjective: no c/o voiced    Today, VSS wt stable   states having right knee surgery on Friday   complaint with meds   Discussed nutrition / sodium intake / fluid intake   Recommend activity as tolerated     Ignacio Daughters is 78 y.o. male who presents today with a history of HTN optimal   Hx of EKG abnormal 1st degree av block /stable   Hx syncope with drinking much alcohol that day / has not has any c/o any presyncope or syncope  states was drinking too much & stopped drinking / feels much better   HLD optimal  GERD stable     Neg stress test 2019   EKG today NSR 1st degee av block similar to last EKG   No c/o cp/sOB/edmea/PND    Has MC wears CPAP     Past Medical History:   Diagnosis Date    Cancer ([facility] [address]     COLON    GERD without esophagitis 5/2/2019    Hypertension     Hypertension, essential 5/2/2019    Obstructive sleep apnea (adult) (pediatric)      Social History:    Social History     Tobacco Use   Smoking Status Never Smoker   Smokeless Tobacco Never Used     Current Medications:  Current Outpatient Medications   Medication Sig Dispense Refill    ferrous sulfate 325 (65 Fe) MG tablet Take 325 mg by mouth      Cholecalciferol (VITAMIN D) 2000 units CAPS capsule Take by mouth daily      amLODIPine (NORVASC) 10 MG tablet Take 10 mg by mouth daily. No current facility-administered medications for this visit. REVIEW OF SYSTEMS:    CONSTITUTIONAL: No major weight gain or loss, night sweats, fever, fatigue, or weakness. HEENT: No new vision difficulties or ringing in the ears. RESPIRATORY: No new SOB, PND, orthopnea or cough. CARDIOVASCULAR: See HPI  GI: No N/V/D, constipation, or abdominal pain. No black/tarry stools  : No urinary urgency, incontinence, or hematuria. SKIN: No cyanosis or skin lesions. MUSCULOSKELETAL: No new muscle or joint pain. NEUROLOGICAL: No syncope or TIA-like symptoms. PSYCHIATRIC: No anxiety, pain, insomnia or depression    Objective:   PHYSICAL EXAM:        Vitals:    03/16/21 1016 03/16/21 1030   BP: (!) 142/82 (!) 140/80   Pulse: 71    Temp: 97.7 °F (36.5 °C)    Weight: 206 lb (93.4 kg)    Height: 5' 10\" (1.778 m)       VITALS:  BP (!) 140/80   Pulse 71   Temp 97.7 °F (36.5 °C)   Ht 5' 10\" (1.778 m)   Wt 206 lb (93.4 kg)   BMI 29.56 kg/m²   CONSTITUTIONAL: Cooperative, no apparent distress, and appears well nourished / developed  NEUROLOGIC:  Awake and orientated to person, place, and time. PSYCH: Calm affect. SKIN: Warm and dry. HEENT: Sclera non-icteric, normocephalic, neck supple. RESPIRATORY:  No increased work of breathing and clear to auscultation, no crackles or wheezing. CARDIOVASCULAR:  Regular rate and rhythm without murmur. Normal S1 and S2. No gallops or rubs. Normal PMI. No elevation of JVP. Normal carotid pulses with no bruits. GI:  Normal bowel sounds. Non-distended. Non-tender to palpation  EXT: No edema. No calf tenderness. Pulses are present bilaterally.     DATA:    Lab Results   Component Value Date    ALT 25 12/05/2019    AST 20 12/05/2019    ALKPHOS 84 12/05/2019    BILITOT 0.6 12/05/2019     Lab Results   Component Value Date    CREATININE 1.9 (H) 07/24/2020    BUN 21 (H) 07/24/2020     12/05/2019    K 4.6 12/05/2019     12/05/2019    CO2 26 12/05/2019     Lab Results   Component Value Date    TSH 1.450 03/29/2019     Lab Results   Component Value Date    WBC 6.7 12/05/2019    HGB 13.7 12/05/2019    HCT 41.3 12/05/2019    MCV 89.3 12/05/2019     12/05/2019     No components found for: CHLPL  Lab Results   Component Value Date    TRIG 78 12/05/2019     Lab Results   Component Value Date    HDL 61 (H) 12/05/2019     Lab Results   Component Value Date    LDLCALC 120 (H) 12/05/2019     Lab Results   Component Value Date    LABVLDL 16 12/05/2019   Radiology Review:  Pertinent images / reports were reviewed as a part of this visit and reveals the following:    Last EKG Echo:Last Stress Test:Last Angiogram:reviewed     Assessment:     MC  uses CPAP now & states feels better / no c/o cp SOB edema / pleasant / active       HTN   optimal   EKG abnormal 1st degree av block /stable   stress nuclear study showing ejection fraction of 72% and was negative for ischemia.       Hx syncope with drinking much alcohol / has not has any c/o any presyncope or syncope  states was drinking too much & stopped drinking / feels much better     CRI  sCr 1.9 7/20 recheck this week      HLD    12/5/2019  / recheck / not on any statins   discussed diet & activity      GERD  Stable      Plan:   12/5/2019  / recheck / not on any statins   discussed diet & activity   Neg stress test 2019   EKG today NSR 1st degee av block similar to last EKG   No c/o cp/SOB/edmea/PND    Has MC wears CPAP   states having right knee surgery on Friday   ok per cardiology for surgery     Fu in 6 months / blood work PTV    overall the patient is stable from CV standpoint    I have addressed the patient's cardiac risk factors and adjusted pharmacologic treatment as needed. In addition, I have reinforced the need for patient directed risk factor modification. Further evaluation will be based upon the patient's clinical course and testing results. All questions and concerns were addressed to the patient. Alternatives to my treatment were discussed. The patient verbalizes understanding not to stop medications without discussing with us. Discussed exercise: 30min of sustained cardiovascular exercise most days of the week   Discussed Low saturated fat / Cholesterol diet. Thank you for allowing us to participate in the care of [facility].     Kyaw STANLEYP [address]     Documentation of today's visit sent to PCP

## 2021-04-05 ENCOUNTER — TELEPHONE (OUTPATIENT)
Dept: CARDIOLOGY CLINIC | Age: 71
End: 2021-04-05

## 2021-04-05 NOTE — TELEPHONE ENCOUNTER
Pt's wife called in stating that [address] suffered from syncope yesterday and passed out. She would like to hear from Brett or Mercy Quintero to help her find out what they should be doing to prevent this.

## 2021-04-12 ENCOUNTER — NURSE ONLY (OUTPATIENT)
Dept: CARDIOLOGY CLINIC | Age: 71
End: 2021-04-12
Payer: MEDICARE

## 2021-04-12 DIAGNOSIS — R55 SYNCOPE, UNSPECIFIED SYNCOPE TYPE: Primary | ICD-10-CM

## 2021-04-12 PROCEDURE — 93000 ELECTROCARDIOGRAM COMPLETE: CPT | Performed by: INTERNAL MEDICINE

## 2021-04-12 PROCEDURE — 93246 EXT ECG>7D<15D RECORDING: CPT | Performed by: INTERNAL MEDICINE

## 2021-05-11 ENCOUNTER — OFFICE VISIT (OUTPATIENT)
Dept: CARDIOLOGY CLINIC | Age: 71
End: 2021-05-11
Payer: MEDICARE

## 2021-05-11 VITALS
BODY MASS INDEX: 29.56 KG/M2 | SYSTOLIC BLOOD PRESSURE: 134 MMHG | DIASTOLIC BLOOD PRESSURE: 80 MMHG | HEART RATE: 76 BPM | WEIGHT: 206 LBS

## 2021-05-11 DIAGNOSIS — R94.31 ABNORMAL EKG: ICD-10-CM

## 2021-05-11 DIAGNOSIS — R07.9 CHEST PAIN, UNSPECIFIED TYPE: Primary | ICD-10-CM

## 2021-05-11 DIAGNOSIS — N23 KIDNEY PAIN: ICD-10-CM

## 2021-05-11 DIAGNOSIS — G47.33 OBSTRUCTIVE SLEEP APNEA (ADULT) (PEDIATRIC): Chronic | ICD-10-CM

## 2021-05-11 DIAGNOSIS — R00.1 BRADYCARDIA: ICD-10-CM

## 2021-05-11 PROCEDURE — 93248 EXT ECG>7D<15D REV&INTERPJ: CPT | Performed by: INTERNAL MEDICINE

## 2021-05-11 PROCEDURE — G8417 CALC BMI ABV UP PARAM F/U: HCPCS | Performed by: INTERNAL MEDICINE

## 2021-05-11 PROCEDURE — G8427 DOCREV CUR MEDS BY ELIG CLIN: HCPCS | Performed by: INTERNAL MEDICINE

## 2021-05-11 PROCEDURE — 3017F COLORECTAL CA SCREEN DOC REV: CPT | Performed by: INTERNAL MEDICINE

## 2021-05-11 PROCEDURE — 1123F ACP DISCUSS/DSCN MKR DOCD: CPT | Performed by: INTERNAL MEDICINE

## 2021-05-11 PROCEDURE — 1036F TOBACCO NON-USER: CPT | Performed by: INTERNAL MEDICINE

## 2021-05-11 PROCEDURE — 4040F PNEUMOC VAC/ADMIN/RCVD: CPT | Performed by: INTERNAL MEDICINE

## 2021-05-11 PROCEDURE — 99214 OFFICE O/P EST MOD 30 MIN: CPT | Performed by: INTERNAL MEDICINE

## 2021-05-11 RX ORDER — HYDROCODONE BITARTRATE AND ACETAMINOPHEN 5; 325 MG/1; MG/1
TABLET ORAL
COMMUNITY
Start: 2021-04-06 | End: 2021-05-11

## 2021-05-11 RX ORDER — POTASSIUM CHLORIDE 750 MG/1
TABLET, FILM COATED, EXTENDED RELEASE ORAL
COMMUNITY
Start: 2021-03-20 | End: 2021-05-11

## 2021-05-11 NOTE — PROGRESS NOTES
[facility]  Office Visit           Shira Aranda MD,  [facility] [place]                      Cardiology           [facility]  1950    May 11, 2021    Primary Cardiologist: Yuri Cárdenas     This patient is here today and doing extremely well. He is very happy. He is on this CPAP for his obstructive sleep apnea and his days are wonderful. No chest pains no shortness of breath no dyspnea no blackout spells. He also has first-degree and some second-degree AV block. He was seen by Dr. Jose Cherry last April 2019 for EP study and no recommendations were made at that time. This patient today looks stable without any complaints. No dizzy spells or blackouts. He did recently wear a monitor for 12 days and did have some second-degree and third-degree AV block but asymptomatic. No long pauses. Last creatinine is up a little bit at 1.9. I did suggest that he needs to have nephrology evaluation and follow-up. Otherwise he is active and moving out without limitations. We will plan to do another ischemia evaluation. He is not on any negative chronotropic drugs. Obstructive sleep apnea on CPAP  Hypertension  Chronic renal failure creatinine 1.9 and will have nephrology referral  First-degree AV block on EKG         HLD optimal  GERD stable       complaint with meds   Discussed nutrition / sodium intake / fluid intake   Recommend activity as tolerated     Reviewed most recent test with pt. / stress nuclear study showing ejection fraction of 72% and was negative for ischemia       Review of Systems:  Constitutional: Denies  fatigue, weakness, night sweats or fever. HEENT: Denies new visual changes, ringing in ears, nosebleeds,nasal congestion  Respiratory: Denies new or change in SOB, PND, orthopnea or cough.    Cardiovascular: see HPI  GI: Denies N/V, diarrhea, constipation, abdominal pain, change in bowel habits, melena or hematochezia  : Denies urinary frequency, urgency, incontinence, hematuria or dysuria. Skin: Denies rash, hives, or cyanosis  Musculoskeletal: Denies joint or muscle aches/pain  Neurological: Denies syncope or TIA-like symptoms. Psychiatric: Denies anxiety, insomnia or depression     Past Medical History:   Diagnosis Date    Cancer ([facility] [address]     COLON    GERD without esophagitis 5/2/2019    Hypertension     Hypertension, essential 5/2/2019    Obstructive sleep apnea (adult) (pediatric)      Past Surgical History:   Procedure Laterality Date    CATARACT REMOVAL WITH IMPLANT  08/14/2012    phaco left eye/ IOL    CATARACT REMOVAL WITH IMPLANT  8/28/2012    Right     COLONOSCOPY      HERNIA REPAIR       TERI    SHOULDER SURGERY      LEFT    SHOULDER SURGERY  12-26-13    BILATERAL LEVATOR ADVANCEMENT, EXCISION OF MULTIPLE NEVIS ON    SMALL INTESTINE SURGERY      UPPER GASTROINTESTINAL ENDOSCOPY  04/19/2017    UPPER GASTROINTESTINAL ENDOSCOPY  06/06/2018    EUS, gastric nodule biopsy     Family History   Problem Relation Age of Onset    High Blood Pressure Mother      Social History     Tobacco Use    Smoking status: Never Smoker    Smokeless tobacco: Never Used   Substance Use Topics    Alcohol use: No    Drug use: No       No Known Allergies  Current Outpatient Medications   Medication Sig Dispense Refill    ferrous sulfate 325 (65 Fe) MG tablet Take 325 mg by mouth      Cholecalciferol (VITAMIN D) 2000 units CAPS capsule Take by mouth daily      amLODIPine (NORVASC) 10 MG tablet Take 10 mg by mouth daily. No current facility-administered medications for this visit.         Physical Exam:   /80   Pulse 76   Wt 206 lb (93.4 kg)   BMI 29.56 kg/m²   BP Readings from Last 3 Encounters:   05/11/21 134/80   03/16/21 (!) 140/80   07/02/20 134/80     Pulse Readings from Last 3 Encounters:   05/11/21 76   03/16/21 71   07/02/20 75     Wt Readings from Last 3 Encounters:   05/11/21 206 lb (93.4 kg)   03/16/21 206 lb (93.4 kg)   07/02/20 216 lb (98 kg)     Constitutional: He is oriented to person, place, and time. He appears well-developed and well-nourished. In no acute distress. HEENT: Normocephalic and atraumatic. Sclerae anicteric. No xanthelasmas. Conjunctiva white, no subconjunctival hemorrhage   External inspection of ears nose teeth & gums   Eyes:PERRLA EOM's intact. Neck: Neck supple. No JVD present. Carotids without bruits. No mass and no thyromegaly present. No lymphadenopathy present. Cardiovascular: RRR, normal S1 and S2; no murmur/gallop or rub, PMI nondisplaced  Pulmonary/Chest: Effort normal.  Lungs clear to auscultation. Chest wall nontender  Abdominal: soft, nontender, nondistended. + bowel sounds; no organomegaly or bruits. Aorta normal  Extremities: No edema, cyanosis, or clubbing. Pulses are 2+ radial/carotid/dorsalis pedis bilaterally. Cap refill brisk. Neurological: No cranial nerve deficit. Psychiatric: He has a normal mood and affect. His speech is normal and behavior is normal.     Lab Review:   Lab Results   Component Value Date    TRIG 78 12/05/2019    HDL 61 12/05/2019    LDLCALC 120 12/05/2019    LABVLDL 16 12/05/2019     Lab Results   Component Value Date     12/05/2019    K 4.6 12/05/2019    K 3.6 04/26/2019     12/05/2019    CO2 26 12/05/2019    BUN 21 07/24/2020    CREATININE 1.9 07/24/2020    GLUCOSE 93 12/05/2019    CALCIUM 9.8 12/05/2019      Lab Results   Component Value Date    WBC 6.7 12/05/2019    HGB 13.7 12/05/2019    HCT 41.3 12/05/2019    MCV 89.3 12/05/2019     12/05/2019   EKG on  Assessment:    MC  uses CPAP now & states feels better / no c/o cp SOB edema / pleasant / active      HTN   optimal   EKG abnormal 1st degree av block /stable   stress nuclear study showing ejection fraction of 72% and was negative for ischemia. EKG on 7/2/2020 sinus rhythm 71/min. First-degree AV block. 1 PVC. Nonspecific ST and T wave changes.   Hx syncope with drinking much alcohol / has not has any c/o any presyncope or syncope  states was drinking too much & stopped drinking / feels much better     HLD   Optimal    GERD  Stable     Plan:  High degree AV block and no current symptoms. We will do a stress Myoview in 6 months with an echocardiogram.  Consideration for cardiac catheterization. We will get referral for nephrology consultation. And follow-up.    Puja Chung MD, [facility] [place]

## 2021-05-17 ENCOUNTER — VIRTUAL VISIT (OUTPATIENT)
Dept: PULMONOLOGY | Age: 71
End: 2021-05-17
Payer: MEDICARE

## 2021-05-17 DIAGNOSIS — E66.09 CLASS 1 OBESITY DUE TO EXCESS CALORIES WITHOUT SERIOUS COMORBIDITY WITH BODY MASS INDEX (BMI) OF 30.0 TO 30.9 IN ADULT: Chronic | ICD-10-CM

## 2021-05-17 DIAGNOSIS — K21.9 GERD WITHOUT ESOPHAGITIS: Chronic | ICD-10-CM

## 2021-05-17 DIAGNOSIS — I10 HYPERTENSION, ESSENTIAL: Chronic | ICD-10-CM

## 2021-05-17 DIAGNOSIS — G47.33 OBSTRUCTIVE SLEEP APNEA (ADULT) (PEDIATRIC): Primary | Chronic | ICD-10-CM

## 2021-05-17 PROCEDURE — 3017F COLORECTAL CA SCREEN DOC REV: CPT | Performed by: NURSE PRACTITIONER

## 2021-05-17 PROCEDURE — 99214 OFFICE O/P EST MOD 30 MIN: CPT | Performed by: NURSE PRACTITIONER

## 2021-05-17 PROCEDURE — G8427 DOCREV CUR MEDS BY ELIG CLIN: HCPCS | Performed by: NURSE PRACTITIONER

## 2021-05-17 PROCEDURE — 4040F PNEUMOC VAC/ADMIN/RCVD: CPT | Performed by: NURSE PRACTITIONER

## 2021-05-17 PROCEDURE — 1123F ACP DISCUSS/DSCN MKR DOCD: CPT | Performed by: NURSE PRACTITIONER

## 2021-05-17 ASSESSMENT — SLEEP AND FATIGUE QUESTIONNAIRES
HOW LIKELY ARE YOU TO NOD OFF OR FALL ASLEEP IN A CAR, WHILE STOPPED FOR A FEW MINUTES IN TRAFFIC: 0
HOW LIKELY ARE YOU TO NOD OFF OR FALL ASLEEP WHILE SITTING AND READING: 0
HOW LIKELY ARE YOU TO NOD OFF OR FALL ASLEEP WHEN YOU ARE A PASSENGER IN A CAR FOR AN HOUR WITHOUT A BREAK: 0
HOW LIKELY ARE YOU TO NOD OFF OR FALL ASLEEP WHILE SITTING AND TALKING TO SOMEONE: 2
HOW LIKELY ARE YOU TO NOD OFF OR FALL ASLEEP WHILE SITTING INACTIVE IN A PUBLIC PLACE: 0
HOW LIKELY ARE YOU TO NOD OFF OR FALL ASLEEP WHILE SITTING QUIETLY AFTER LUNCH WITHOUT ALCOHOL: 3
HOW LIKELY ARE YOU TO NOD OFF OR FALL ASLEEP WHILE WATCHING TV: 3

## 2021-05-17 NOTE — PROGRESS NOTES
Cleola Schaumann         : 1950    Diagnosis: [x] MC (G47.33) [] CSA (G47.31) [] Apnea (G47.30)   Length of Need: [x] 12 Months [] 99 Months [] Other:    Machine (MEGAN!): [x] Respironics Dream Station   2   Auto [] ResMed AirSense     Auto [] Other:     []  CPAP () [] Bilevel ()   Mode: [] Auto [] Spontaneous    Mode: [] Auto [] Spontaneous                            Comfort Settings:   - Ramp Pressure:  cmH2O                                        - Ramp time: 15 min                                     -  Flex/EPR - 3 full time                                    - For ResMed Bilevel (TiMax-4 sec   TiMin- 0.2 sec)     Humidifier: [x] Heated ()        [x] Water chamber replacement ()/ 1 per 6 months        Mask:   [] Nasal () /1 per 3 months [x] Full Face () /1 per 3 months   [] Patient choice -Size and fit mask [x] Patient Choice - Size and fit mask   [] Dispense:  [] Dispense:    [] Headgear () / 1 per 3 months [x] Headgear () / 1 per 3 months   [] Replacement Nasal Cushion ()/2 per month [x] Interface Replacement ()/1 per month   [] Replacement Nasal Pillows ()/2 per month         Tubing: [x] Heated ()/1 per 3 months    [] Standard ()/1 per 3 months [] Other:           Filters: [x] Non-disposable ()/1 per 6 months     [x] Ultra-Fine, Disposable ()/2 per month        Miscellaneous: [] Chin Strap ()/ 1 per 6 months [] O2 bleed-in:       LPM   [] Oximetry on CPAP/Bilevel []  Other:    [x] Modem: ()         Start Order Date: 21    MEDICAL JUSTIFICATION:  I, the undersigned, certify that the above prescribed supplies are medically necessary for this patients wellbeing. In my opinion, the supplies are both reasonable and necessary in reference to accepted standards of medicalpractice in treatment of this patients condition.     NELA Montgomery - CARMEN      NPI: [id number]       Order Signed Date: 21    Electronically signed by NELA Weeks CNP on 2021 at 8:00 AM    [facility]  1950  [address] [facility]  [phone] (home)   [phone] (mobile)      Insurance Info (confirm with patient if correct):  Payor/Plan Subscr  Sex Relation Sub.  Ins. ID Effective Group Num

## 2021-05-17 NOTE — PROGRESS NOTES
NA  [x] Yes    [] No     Mask is fitting well  [x] Yes  [] No   Noting Mask Air Leak  [x] Yes  [] No   Having painful Aerophagia  [] Yes  [x] No   Nocturia   3  per night. Having  HA upon waking  [x] Yes  [] No   Dry mouth upon waking   Dry Nose  Dry Eyes  [x] Yes  [] No   Congestion upon waking   [] Yes  [x] No    Nose Bleeds  [] Yes  [x] No   Using Sleep Aides  [x] NA  [] OTC  [] Per our office   [] Per another provider   Understands how to change humidification and/or tubing temperature for comfort while at home  [x] Yes  [] No     Difficulties falling asleep  [] Yes  [x] No   Difficulties staying asleep  [] Yes  [x] No   Approximate time to bed  10:30-11pm   Approximate wake time  4-5am   Taking Naps  no   If taking naps usual length      [x] NA   If taking naps using the machine [x] NA  [] Yes    [] No    [] With and With out    Drowsy when driving  [] Yes  [x] No     Does patient carry a DOT/CDL  [] Yes  [x] No     Does patient carry FAA/Pilots License   [] Yes  [x] No      Any concerns noted with the machine at this time  [] Yes  [x] No       Assessment/Plan:   1. Obstructive sleep apnea (adult) (pediatric)  Assessment & Plan:  After downloading data and reviewing  Reviewed compliance download with pt. Supplies and parts as needed for his machine. These are medically necessary. Continue medications per his PCP and other physicians. Limit caffeine use after 3pm.    The chronic medical conditions listed are directly related to the primary diagnosis listed above. The management of the primary diagnosis affects the secondary diagnosis and vice versa    Patient is complaint encouraged to maintain compliance to aide on controlling other stated healthcare concerns. 2. Hypertension, essential  Assessment & Plan:  Chronic- stable. After speaking with patient:    Agree with current plan, and would agree to continue this plan per prescribing and managing physician.      3. GERD without esophagitis Assessment & Plan:  Chronic- stable. After speaking with patient:    Agree with current plan, and would agree to continue this plan per prescribing and managing physician. 4. Class 1 obesity due to excess calories without serious comorbidity with body mass index (BMI) of 30.0 to 30.9 in adult  Assessment & Plan:  Patient encouraged to work on maintaining a healthy weight per height. Achievable with diet restriction/modifications and exercise (may consult primary care if unsure of any restrictions or concerns). Weight management directly correlates to risk of control and maintenance of Obstructive Sleep Apnea. - After pulling data and reviewing it   - Reviewed compliance download with patient    -Medically necessary supplies and parts as needed for his machine.   - Continue medications per his primary care provider and other physicians.   - Encouraged to limit caffeine use after 3pm.    - Encouraged him to work on weight loss through diet and exercise  - Educated not to drive when feeling sleepy   - Patient using Maria Alejandra's  - Mask trouble shooting dipping in hot water to help set the mask better  Turning the pressure on prior to placing the mask for better fit  Office locations  Compliance  Follow up  After speaking to the patient, patient is currently stable. We will continue with the current machine settings    The chronic medical conditions listed are directly related to the primary diagnosis listed above. The management of the primary diagnosis affects the secondary diagnosis and vice versa.     - Will follow up in off in 12 months    Electronically signed by  Nayeli James, MSN, RN, CNP on 5/17/2021 at 8:04 AM

## 2021-05-24 ENCOUNTER — HOSPITAL ENCOUNTER (OUTPATIENT)
Age: 71
Discharge: HOME | End: 2021-05-24
Payer: MEDICARE

## 2021-05-24 DIAGNOSIS — N17.9 AKI (ACUTE KIDNEY INJURY) (HCC): ICD-10-CM

## 2021-05-24 LAB
ALBUMIN SERPL-MCNC: 3.9 G/DL (ref 3.4–5)
ANION GAP SERPL CALCULATED.3IONS-SCNC: 13 MMOL/L (ref 3–16)
BILIRUBIN URINE: ABNORMAL
BLOOD, URINE: NEGATIVE
BUN BLDV-MCNC: 24 MG/DL (ref 7–20)
CALCIUM SERPL-MCNC: 9.1 MG/DL (ref 8.3–10.6)
CHLORIDE BLD-SCNC: 106 MMOL/L (ref 99–110)
CLARITY: ABNORMAL
CO2: 24 MMOL/L (ref 21–32)
COLOR: ABNORMAL
CREAT SERPL-MCNC: 1.7 MG/DL (ref 0.8–1.3)
CREATININE URINE: 419.3 MG/DL (ref 39–259)
EPITHELIAL CELLS, UA: 1 /HPF (ref 0–5)
GFR AFRICAN AMERICAN: 49
GFR NON-AFRICAN AMERICAN: 40
GLUCOSE BLD-MCNC: 88 MG/DL (ref 70–99)
GLUCOSE URINE: NEGATIVE MG/DL
HYALINE CASTS: ABNORMAL /LPF (ref 0–2)
KETONES, URINE: ABNORMAL MG/DL
LEUKOCYTE ESTERASE, URINE: ABNORMAL
MICROALBUMIN UR-MCNC: 8.3 MG/DL
MICROALBUMIN/CREAT UR-RTO: 19.8 MG/G (ref 0–30)
MICROSCOPIC EXAMINATION: YES
NITRITE, URINE: NEGATIVE
PH UA: 5.5 (ref 5–8)
PHOSPHORUS: 3.7 MG/DL (ref 2.5–4.9)
POTASSIUM SERPL-SCNC: 3.5 MMOL/L (ref 3.5–5.1)
PROTEIN UA: 100 MG/DL
RBC UA: 2 /HPF (ref 0–4)
SODIUM BLD-SCNC: 143 MMOL/L (ref 136–145)
SPECIFIC GRAVITY UA: 1.02 (ref 1–1.03)
URINE TYPE: ABNORMAL
UROBILINOGEN, URINE: 1 E.U./DL
WBC UA: 32 /HPF (ref 0–5)

## 2021-05-24 PROCEDURE — 83883 ASSAY NEPHELOMETRY NOT SPEC: CPT

## 2021-05-24 PROCEDURE — 81001 URINALYSIS AUTO W/SCOPE: CPT

## 2021-05-24 PROCEDURE — 82570 ASSAY OF URINE CREATININE: CPT

## 2021-05-24 PROCEDURE — 80069 RENAL FUNCTION PANEL: CPT

## 2021-05-24 PROCEDURE — 82043 UR ALBUMIN QUANTITATIVE: CPT

## 2021-05-25 ENCOUNTER — HOSPITAL ENCOUNTER (OUTPATIENT)
Dept: ULTRASOUND IMAGING | Age: 71
Discharge: HOME | End: 2021-05-25
Payer: MEDICARE

## 2021-05-25 DIAGNOSIS — N17.9 AKI (ACUTE KIDNEY INJURY) (HCC): ICD-10-CM

## 2021-05-25 LAB
KAPPA, FREE LIGHT CHAINS, SERUM: 40.82 MG/L (ref 3.3–19.4)
KAPPA/LAMBDA RATIO: 2.12 (ref 0.26–1.65)
KAPPA/LAMBDA TEST COMMENT: ABNORMAL
LAMBDA, FREE LIGHT CHAINS, SERUM: 19.23 MG/L (ref 5.71–26.3)

## 2021-05-25 PROCEDURE — 76770 US EXAM ABDO BACK WALL COMP: CPT

## 2021-07-02 ENCOUNTER — HOSPITAL ENCOUNTER (OUTPATIENT)
Dept: ULTRASOUND IMAGING | Age: 71
Discharge: HOME | End: 2021-07-02
Payer: MEDICARE

## 2021-07-02 DIAGNOSIS — N28.1 CYST, KIDNEY, ACQUIRED: ICD-10-CM

## 2021-07-15 ENCOUNTER — HOSPITAL ENCOUNTER (OUTPATIENT)
Dept: NON INVASIVE DIAGNOSTICS | Age: 71
Discharge: HOME | End: 2021-07-15
Payer: MEDICARE

## 2021-07-15 DIAGNOSIS — R07.9 CHEST PAIN, UNSPECIFIED TYPE: ICD-10-CM

## 2021-07-15 LAB
LV EF: 62 %
LV EF: 63 %
LVEF MODALITY: NORMAL
LVEF MODALITY: NORMAL

## 2021-07-15 PROCEDURE — 3430000000 HC RX DIAGNOSTIC RADIOPHARMACEUTICAL: Performed by: INTERNAL MEDICINE

## 2021-07-15 PROCEDURE — 93306 TTE W/DOPPLER COMPLETE: CPT

## 2021-07-15 PROCEDURE — 78452 HT MUSCLE IMAGE SPECT MULT: CPT

## 2021-07-15 PROCEDURE — A9502 TC99M TETROFOSMIN: HCPCS | Performed by: INTERNAL MEDICINE

## 2021-07-15 PROCEDURE — 93017 CV STRESS TEST TRACING ONLY: CPT | Performed by: INTERNAL MEDICINE

## 2021-07-15 RX ADMIN — TETROFOSMIN 10 MILLICURIE: 1.38 INJECTION, POWDER, LYOPHILIZED, FOR SOLUTION INTRAVENOUS at 11:59

## 2021-07-15 RX ADMIN — TETROFOSMIN 30 MILLICURIE: 1.38 INJECTION, POWDER, LYOPHILIZED, FOR SOLUTION INTRAVENOUS at 13:18

## 2021-07-15 NOTE — PROGRESS NOTES
Patient instructed on Alejandro Protocol Stress Test Procedure including possible side effects and adverse reactions. Verbalizes knowledge and understanding and denies having any questions.

## 2021-11-16 ENCOUNTER — OFFICE VISIT (OUTPATIENT)
Dept: CARDIOLOGY CLINIC | Age: 71
End: 2021-11-16
Payer: MEDICARE

## 2021-11-16 VITALS
HEART RATE: 76 BPM | SYSTOLIC BLOOD PRESSURE: 122 MMHG | WEIGHT: 200 LBS | DIASTOLIC BLOOD PRESSURE: 64 MMHG | BODY MASS INDEX: 28.7 KG/M2

## 2021-11-16 DIAGNOSIS — I10 HYPERTENSION, ESSENTIAL: Primary | ICD-10-CM

## 2021-11-16 DIAGNOSIS — R55 SYNCOPE, UNSPECIFIED SYNCOPE TYPE: ICD-10-CM

## 2021-11-16 DIAGNOSIS — R00.1 BRADYCARDIA: ICD-10-CM

## 2021-11-16 DIAGNOSIS — G47.33 OBSTRUCTIVE SLEEP APNEA (ADULT) (PEDIATRIC): ICD-10-CM

## 2021-11-16 PROCEDURE — 99214 OFFICE O/P EST MOD 30 MIN: CPT | Performed by: INTERNAL MEDICINE

## 2021-11-16 NOTE — PROGRESS NOTES
[facility]  Office Visit           Isi Zepeda MD,  [facility] [place]                      Cardiology           Melvin Jaime  1950 November 16, 2021    Primary Cardiologist: [facility]     Patient is here today and generally doing well from a cardiac perspective. Relates that he was in hospital at [facility] with Covid pneumonia for 3 weeks. Did not require ventilator and has been out for 1 month at this time. Did not receive vaccine. Still short of breath and wheezing with minimal exertion. He walks to the door and has some shortness of breath. No fever no chills. No peripheral edema. Now on Eliquis post cystoscopy Covid pneumonia apparently had some blood clots in his legs. The creatinine interestingly it was 1.6 which is better than usual for him. Coronavirus vaccine none yet. Did get coronavirus pneumonia and laments the idea that he did not receive a vaccine. We will get a vaccine when it becomes available in 90 days. Obstructive sleep apnea on CPAP  Hypertension  Chronic renal failure creatinine 1.9 and will have nephrology referral  First-degree AV block on EKG         HLD optimal  GERD stable       complaint with meds   Discussed nutrition / sodium intake / fluid intake   Recommend activity as tolerated     Reviewed most recent test with pt. / stress nuclear study showing ejection fraction of 72% and was negative for ischemia       Review of Systems:  Constitutional: Denies  fatigue, weakness, night sweats or fever. HEENT: Denies new visual changes, ringing in ears, nosebleeds,nasal congestion  Respiratory: Denies new or change in SOB, PND, orthopnea or cough. Cardiovascular: see HPI  GI: Denies N/V, diarrhea, constipation, abdominal pain, change in bowel habits, melena or hematochezia  : Denies urinary frequency, urgency, incontinence, hematuria or dysuria.   Skin: Denies rash, hives, or cyanosis  Musculoskeletal: Denies joint or muscle aches/pain  Neurological: Denies syncope or TIA-like symptoms. Psychiatric: Denies anxiety, insomnia or depression     Past Medical History:   Diagnosis Date    Cancer ([facility] [address]     COLON    GERD without esophagitis 5/2/2019    Hypertension     Hypertension, essential 5/2/2019    Obstructive sleep apnea (adult) (pediatric)      Past Surgical History:   Procedure Laterality Date    CATARACT REMOVAL WITH IMPLANT  08/14/2012    phaco left eye/ IOL    CATARACT REMOVAL WITH IMPLANT  8/28/2012    Right     COLONOSCOPY      HERNIA REPAIR       TERI    SHOULDER SURGERY      LEFT    SHOULDER SURGERY  12-26-13    BILATERAL LEVATOR ADVANCEMENT, EXCISION OF MULTIPLE NEVIS ON    SMALL INTESTINE SURGERY      UPPER GASTROINTESTINAL ENDOSCOPY  04/19/2017    UPPER GASTROINTESTINAL ENDOSCOPY  06/06/2018    EUS, gastric nodule biopsy     Family History   Problem Relation Age of Onset    High Blood Pressure Mother      Social History     Tobacco Use    Smoking status: Never Smoker    Smokeless tobacco: Never Used   Vaping Use    Vaping Use: Never used   Substance Use Topics    Alcohol use: No    Drug use: No       No Known Allergies  Current Outpatient Medications   Medication Sig Dispense Refill    ferrous sulfate 325 (65 Fe) MG tablet Take 325 mg by mouth      Cholecalciferol (VITAMIN D) 2000 units CAPS capsule Take by mouth daily      amLODIPine (NORVASC) 10 MG tablet Take 10 mg by mouth daily. No current facility-administered medications for this visit.        Physical Exam:   /64   Pulse 76   Wt 200 lb (90.7 kg)   BMI 28.70 kg/m²   BP Readings from Last 3 Encounters:   11/16/21 122/64   07/22/21 139/80   05/21/21 (!) 141/82     Pulse Readings from Last 3 Encounters:   11/16/21 76   07/22/21 61   05/11/21 76     Wt Readings from Last 3 Encounters:   11/16/21 200 lb (90.7 kg)   07/22/21 200 lb 6.4 oz (90.9 kg)   05/21/21 198 lb 6.4 oz (90 kg)     Constitutional: He is oriented to person, place, and time. He appears well-developed and well-nourished. In no acute distress. HEENT: Normocephalic and atraumatic. Sclerae anicteric. No xanthelasmas. Conjunctiva white, no subconjunctival hemorrhage   External inspection of ears nose teeth & gums   Eyes:PERRLA EOM's intact. Neck: Neck supple. No JVD present. Carotids without bruits. No mass and no thyromegaly present. No lymphadenopathy present. Cardiovascular: RRR, normal S1 and S2; no murmur/gallop or rub, PMI nondisplaced  Pulmonary/Chest: Effort normal.  Lungs clear to auscultation. Chest wall nontender  Abdominal: soft, nontender, nondistended. + bowel sounds; no organomegaly or bruits. Aorta normal  Extremities: No edema, cyanosis, or clubbing. Pulses are 2+ radial/carotid/dorsalis pedis bilaterally. Cap refill brisk. Neurological: No cranial nerve deficit. Psychiatric: He has a normal mood and affect. His speech is normal and behavior is normal.     Lab Review:   Lab Results   Component Value Date    TRIG 78 12/05/2019    HDL 61 12/05/2019    LDLCALC 120 12/05/2019    LABVLDL 16 12/05/2019     Lab Results   Component Value Date     05/24/2021    K 3.5 05/24/2021    K 3.6 04/26/2019     05/24/2021    CO2 24 05/24/2021    BUN 24 05/24/2021    CREATININE 1.7 05/24/2021    GLUCOSE 88 05/24/2021    CALCIUM 9.1 05/24/2021      Lab Results   Component Value Date    WBC 6.7 12/05/2019    HGB 13.7 12/05/2019    HCT 41.3 12/05/2019    MCV 89.3 12/05/2019     12/05/2019   EKG on  Assessment:    MC  uses CPAP now & states feels better / no c/o cp SOB edema / pleasant / active      HTN   optimal   EKG abnormal 1st degree av block /stable   stress nuclear study showing ejection fraction of 72% and was negative for ischemia. EKG on 7/2/2020 sinus rhythm 71/min. First-degree AV block. 1 PVC. Nonspecific ST and T wave changes.   Hx syncope with drinking much alcohol / has not has any c/o any presyncope or syncope  states was drinking too much & stopped drinking / feels much better     HLD   Optimal    GERD  Stable     Plan:  Continuing his current therapy. He is on Eliquis for Covid pneumonia and for DVT. Heart wise all looks stable. We will follow him  in 4 months.    Margaret Lentz MD, [facility] [place]

## 2021-11-17 NOTE — TELEPHONE ENCOUNTER
Electrophysiology Study (EPS)     Date of Procedure: 4/26/19    Time of Arrival: 10:00    Cardiologist performing procedure: Dr. Russell Mooney    · Arrive at [facility] through the main entrance. Check in at the Outpatient Diagnostic desk on the 1st floor. · Do not eat or drink anything after midnight the night before the procedure. · You may brush your teeth and rinse the morning of the procedure. · Take ALL of your routine medications the morning of the procedure. However, if you are taking diabetic medications, please HOLD on the day of the procedure (including insulin). If you take Lantus insulin, take HALF of your usual dose the night before. · Do NOT stop taking aspirin, Plavix, coumadin, Eliquis, Xarelto, or Pradaxa (any blood thinners). However, do not take blood thinner the morning of the procedure. · Do not apply any lotion, powder, or deodorant the morning of the procedure. · Please bring a list of your medications to the hospital with you. · You must have someone available to drive you home the next day. It is recommended that you do not drive for 24 hours after the procedure. · If you are unable to make this appointment, please call [facility] at [phone]. Specialty Care (Immediate)...

## 2022-01-13 ENCOUNTER — OFFICE VISIT (OUTPATIENT)
Dept: ORTHOPEDIC SURGERY | Age: 72
End: 2022-01-13
Payer: MEDICARE

## 2022-01-13 VITALS — WEIGHT: 207.8 LBS | BODY MASS INDEX: 29.75 KG/M2 | HEIGHT: 70 IN | RESPIRATION RATE: 16 BRPM

## 2022-01-13 DIAGNOSIS — M25.552 CHRONIC LEFT HIP PAIN: ICD-10-CM

## 2022-01-13 DIAGNOSIS — G89.29 CHRONIC LEFT HIP PAIN: ICD-10-CM

## 2022-01-13 DIAGNOSIS — M16.12 PRIMARY OSTEOARTHRITIS OF LEFT HIP: Primary | ICD-10-CM

## 2022-01-13 PROCEDURE — 99203 OFFICE O/P NEW LOW 30 MIN: CPT | Performed by: ORTHOPAEDIC SURGERY

## 2022-01-13 NOTE — PROGRESS NOTES
CHIEF COMPLAINT: Left hip pain. History:   Narinder Lagos is a 73 y.o. male who presents today for evaluation and treatment of left hip pain / injury. Patient was self-referred. This is evaluated as a personal injury. He states that the pain began over 1 year ago. Patient rates the pain as a 10/10. Patient did not have a history of injury. Pain is located in his groin. He states symptoms are worse with twisting, walking, standing, stairs, getting up from sitting, and crossing his legs. He states that he would like to be more active. He states his wife is 10 years younger than him and used to be a competitive . He wants to be able to walk with her and do activities. He also has grandkids that he would like to play with. He has previously been treated at [facility]. The patient has not taken NSAIDs because of mild renal insufficiency. The patient has had an intra-articular hip injection with relief on 8/4/21 which did provide relief per office notes. Outside reports reviewed: office notes.       Past Medical History:   Diagnosis Date    Cancer [facility])     COLON    GERD without esophagitis 5/2/2019    Hypertension     Hypertension, essential 5/2/2019    Obstructive sleep apnea (adult) (pediatric)        Past Surgical History:   Procedure Laterality Date    CATARACT REMOVAL WITH IMPLANT  08/14/2012    phaco left eye/ IOL    CATARACT REMOVAL WITH IMPLANT  8/28/2012    Right     COLONOSCOPY      HERNIA REPAIR       TERI    SHOULDER SURGERY      LEFT    SHOULDER SURGERY  12-26-13    BILATERAL LEVATOR ADVANCEMENT, EXCISION OF MULTIPLE NEVIS ON    SMALL INTESTINE SURGERY      UPPER GASTROINTESTINAL ENDOSCOPY  04/19/2017    UPPER GASTROINTESTINAL ENDOSCOPY  06/06/2018    EUS, gastric nodule biopsy       Family History   Problem Relation Age of Onset    High Blood Pressure Mother        Social History     Socioeconomic History    Marital status:      Spouse name: Not on file    Number of children: Not on file    Years of education: Not on file    Highest education level: Not on file   Occupational History    Not on file   Tobacco Use    Smoking status: Never Smoker    Smokeless tobacco: Never Used   Vaping Use    Vaping Use: Never used   Substance and Sexual Activity    Alcohol use: No    Drug use: No    Sexual activity: Not on file   Other Topics Concern    Not on file   Social History Narrative    Not on file     Social Determinants of Health     Financial Resource Strain:     Difficulty of Paying Living Expenses: Not on file   Food Insecurity:     Worried About Running Out of Food in the Last Year: Not on file    [place] of Food in the Last Year: Not on file   Transportation Needs:     Lack of Transportation (Medical): Not on file    Lack of Transportation (Non-Medical):  Not on file   Physical Activity:     Days of Exercise per Week: Not on file    Minutes of Exercise per Session: Not on file   Stress:     Feeling of Stress : Not on file   Social Connections:     Frequency of Communication with Friends and Family: Not on file    Frequency of Social Gatherings with Friends and Family: Not on file    Attends Latter day Services: Not on file    Active Member of [address] [place] [address] [employer] or Organizations: Not on file    Attends Club or Organization Meetings: Not on file    Marital Status: Not on file   Intimate Partner Violence:     Fear of Current or Ex-Partner: Not on file    Emotionally Abused: Not on file    Physically Abused: Not on file    Sexually Abused: Not on file   Housing Stability:     Unable to Pay for Housing in the Last Year: Not on file    Number of [place] in the Last Year: Not on file    Unstable Housing in the Last Year: Not on file       Current Outpatient Medications   Medication Sig Dispense Refill    ferrous sulfate 325 (65 Fe) MG tablet Take 325 mg by mouth      Cholecalciferol (VITAMIN D) 2000 units CAPS capsule Take by mouth daily  amLODIPine (NORVASC) 10 MG tablet Take 10 mg by mouth daily. No current facility-administered medications for this visit. No Known Allergies     Review of Systems:  I have reviewed the clinically relevant past medical history, medications, allergies, family history, social history, and 13 point Review of Systems from the patient's recent history form & documented any details relevant to today's presenting complaints in the history above. The patient's self-reported past medical history, medications, allergies, family history, social history, and Review of Systems form from 1/13/22 have been scanned into the chart under the \"Media\" tab. Physical Examination:     Vital signs:   Resp 16   Ht 5' 10\" (1.778 m)   Wt 207 lb 12.8 oz (94.3 kg)   BMI 29.82 kg/m²     General:  alert, appears stated age, cooperative and no distress   Feet:  normal   Gait:  Normal. The patient can bear weight on the injured extremity. Left Hip  Passive ROM:  0 degrees extension     degrees flexion   45 degrees external rotation   15-20 degrees internal rotation   Right hip: 0 degrees extension, 100 flexion, 45 degrees external rotation, 25 degrees internal rotation   Impingement test:  positive   Right hip: negative   Labral stress test:  positive   Right hip: negative   Praful test:  negative   Greater trochanter tenderness:  negative   Pirifiormis / Gluteus medius tenderness:  negative   Straight-leg raise:  positive   Leg length discrepancy:  Equal      Motor exam noted and recorded, quadriceps, hamstrings, foot dorsiflexors and plantar flexors are intact 5/5 equal and symmetric. Sensation is intact grossly to light touch in the tibial, peroneal, sural, and saphenous nerve distributions bilaterally. Both feet are well perfused and sensory is intact to feet equal and symmetric. There is not  any cellulitis, skin lesions, or lymphedema noted bilaterally.       Imaging:  Left hip xrays 6/1/21 [facility]: reviewed. They demonstrate no evidence of acute fracture, subluxation, or dislocation. Severe joint space narrowing with osteophytes      Assessment:     Left hip osteoarthritis  GERD  Hypertension  Mild renal insufficiency      Plan:     Natural history and expected course discussed. Questions answered. We discussed nonoperative and operative treatments for osteoarthritis. He does have some pain, but his greatest desire is to be able to be more active. We will have him see Dr. Bibi Smiley for evaluation for hip replacement. Robb Cox. Melida Aranda MD  Orthopaedic Surgery and Sports Medicine     Disclaimer: This note was generated with use of a verbal recognition program and an attempt was made to check for errors. It is possible that there are still dictated errors within this office note. If so, please bring any significant errors to my attention for an addendum. All efforts were made to ensure that this office note is accurate.

## 2022-01-19 ENCOUNTER — OFFICE VISIT (OUTPATIENT)
Dept: ORTHOPEDIC SURGERY | Age: 72
End: 2022-01-19
Payer: MEDICARE

## 2022-01-19 VITALS — BODY MASS INDEX: 29.63 KG/M2 | WEIGHT: 207 LBS | HEIGHT: 70 IN

## 2022-01-19 DIAGNOSIS — Z01.818 PREOP TESTING: ICD-10-CM

## 2022-01-19 DIAGNOSIS — M16.12 PRIMARY OSTEOARTHRITIS OF LEFT HIP: Primary | ICD-10-CM

## 2022-01-19 PROCEDURE — 99214 OFFICE O/P EST MOD 30 MIN: CPT | Performed by: ORTHOPAEDIC SURGERY

## 2022-01-19 NOTE — PROGRESS NOTES
RAPT  RISK ASSESSMENT and PREDICTION TOOL    Name: Melinda Hanson  Date of [place]  Surgeon: Leonila Castro MD         Value Score    1). What is your age group? 50 - 65 years  = 2      66 - 75 years = 1     > 75 years = 0       Your score = 1   2). Gender? Male = 2     Female = 1       Your score = 2   3). How far on average can you walk? Two blocks or more (+/- rest) = 2    (a block is 200 vsnvnp=212 ft)  1 - 2 blocks (+/- rest) = 1     Housebound (most of time) = 0       Your score = 1   4). Which gait aid do you use? None = 2    (more often than not) Single-point stick = 1     Crutches/walker = 0       Your score = 1   5). Do you use community supports? None or one per week = 1    (home help, meals on wheels, district nursing) Two or more per week = 0       Your score = 1   6). Will you live with someone who can care for you after your operation? yes = 3     no = 0       Your score = 3    Your Total Score (out of 12) = 9       Key: Destination at discharge from acute care predicted by score.   Score < 6  = extended inpatient rehabilitation  Score 6 - 9  = additional intervention to discharge directly home (Rehab in the home)  Score > 9  = directly home      Patient's Preference Prediction Score Agreed destination   [facility] [address]  Date: 1/19/22

## 2022-01-19 NOTE — PROGRESS NOTES
ORTHOPAEDIC CONSULTATION NOTE    Chief Complaint   Patient presents with    New Patient     NP Lt Hip ref from Dr. Lizabeth Humphrey       HPI  1/19/22  73 y.o. male seen in consultation at the request of Samson Snow MD for evaluation of left hip pain, discuss SERENITY:  Onset years  Injury/trauma none  History of symptoms as above  Pain is located lateral hip and groin   No distal radiation  Worse with activity, pivoting, sitting, working out  Better with rest   Previously saw Dr Gil Spain, had left hip steroid injection which helped for few months, but symptoms have returned  Associated with stiffness  Denies subjective LLD  Numbness and/or tingling = no      Review of Systems  I have read over the ROS from the Patient History Form dated on 1/13/22  Pertinent positives include hypertension, urinary frequency  Rest of 13 point ROS otherwise negative except per HPI, and scanned into the patient's chart under the Media tab. No Known Allergies     Current Outpatient Medications   Medication Sig Dispense Refill    apixaban (ELIQUIS) 5 MG TABS tablet Take 5 mg by mouth 2 times daily      POTASSIUM PO Take by mouth      ferrous sulfate 325 (65 Fe) MG tablet Take 325 mg by mouth      Cholecalciferol (VITAMIN D) 2000 units CAPS capsule Take by mouth daily      amLODIPine (NORVASC) 10 MG tablet Take 10 mg by mouth daily. No current facility-administered medications for this visit.        Past Medical History:   Diagnosis Date    Cancer [facility])     COLON    GERD without esophagitis 5/2/2019    Hypertension     Hypertension, essential 5/2/2019    Obstructive sleep apnea (adult) (pediatric)         Past Surgical History:   Procedure Laterality Date    CATARACT REMOVAL WITH IMPLANT  08/14/2012    phaco left eye/ IOL    CATARACT REMOVAL WITH IMPLANT  8/28/2012    Right     COLONOSCOPY      HERNIA REPAIR       TERI    SHOULDER SURGERY      LEFT    SHOULDER SURGERY  12-26-13    BILATERAL LEVATOR ADVANCEMENT, EXCISION OF MULTIPLE NEVIS ON    SMALL INTESTINE SURGERY      UPPER GASTROINTESTINAL ENDOSCOPY  04/19/2017    UPPER GASTROINTESTINAL ENDOSCOPY  06/06/2018    EUS, gastric nodule biopsy       Family History   Problem Relation Age of Onset    High Blood Pressure Mother        Social History     Socioeconomic History    Marital status:      Spouse name: Not on file    Number of children: Not on file    Years of education: Not on file    Highest education level: Not on file   Occupational History    Not on file   Tobacco Use    Smoking status: Never Smoker    Smokeless tobacco: Never Used   Vaping Use    Vaping Use: Never used   Substance and Sexual Activity    Alcohol use: No    Drug use: No    Sexual activity: Not on file   Other Topics Concern    Not on file   Social History Narrative    Not on file     Social Determinants of Health     Financial Resource Strain:     Difficulty of Paying Living Expenses: Not on file   Food Insecurity:     Worried About Running Out of Food in the Last Year: Not on file    Michaelle of Food in the Last Year: Not on file   Transportation Needs:     Lack of Transportation (Medical): Not on file    Lack of Transportation (Non-Medical):  Not on file   Physical Activity:     Days of Exercise per Week: Not on file    Minutes of Exercise per Session: Not on file   Stress:     Feeling of Stress : Not on file   Social Connections:     Frequency of Communication with Friends and Family: Not on file    Frequency of Social Gatherings with Friends and Family: Not on file    Attends [employer] Services: Not on file    Active Member of Clubs or Organizations: Not on file    Attends Club or Organization Meetings: Not on file    Marital Status: Not on file   Intimate Partner Violence:     Fear of Current or Ex-Partner: Not on file    Emotionally Abused: Not on file    Physically Abused: Not on file    Sexually Abused: Not on file   Housing Stability:     Unable to Pay for Housing in the Last Year: Not on file    Number of Places Lived in the Last Year: Not on file    Unstable Housing in the Last Year: Not on file        Vitals:    01/19/22 0919   Weight: 207 lb (93.9 kg)   Height: 5' 10\" (1.778 m)       Physical Exam  Constitutional - well-groomed, well-nourished, Body mass index is 29.7 kg/m². Psychiatric - very pleasant, normal mood & affect  Cardiovascular - RRR, negative peripheral edema, left posterior tibialis pulse 1+  Respiratory - respirations unlabored, on room air; mask on  Abdomen - soft, no pannus  Skin - no rashes, wounds, or lesions seen on exposed skin  Neurological - LLE SILT SP/DP/T/LFC nerve distributions; EHL/FHL/TA/GS/quad intact  Left hip -    Inspection:  No obvious deformity/swelling/ecchymosis   Palpation:   No TTP laterally   Range of Motion:    Flexion contracture:  none    Flexion:  100     IR:  20-30     ER:  60    Abduction:  40     Positive Stinchfield test   Crepitus with ROM      Imaging:  Images were personally reviewed by myself and discussed with the patient  Prior AP pelvis and left hip radiographs from January 13 were reviewed today  It does show severe left hip arthritis with complete loss of articular height, subchondral sclerosis and cystic changes. Vascular calcifications are present. Assessment & Plan:  73 y.o. male who presents with    Diagnosis Orders   1. Primary osteoarthritis of left hip  [facility]    EKG 12 Lead    Comprehensive Metabolic Panel    Hemoglobin A1C    CBC Auto Differential    Urinalysis with Microscopic    Urinalysis Reflex to Culture    MRSA DNA Probe, Nasal    APTT    PROTIME-INR    TYPE AND SCREEN   2.  Preop testing  COVID-    EKG 12 Lead    Comprehensive Metabolic Panel    Hemoglobin A1C    CBC Auto Differential    Urinalysis with Microscopic    Urinalysis Reflex to Culture    MRSA DNA Probe, Nasal    APTT    PROTIME-INR    TYPE AND SCREEN       No orders of the defined types were placed in this encounter. Thank you Dr Melida Aranda for referring Nata Meza to me for evaluation of his left hip:    Severe left hip arthritis   Chronic symptoms  Has failed conservative treatment up to this point including time, activity mod, OTCs, prior steroid injection, HEP, and is therefore interested in pursuing surgical intervention  He is active at baseline, and his pain is interfering with his activities and workouts    Details of SERENITY, success rate, and postop discussed  Factors associated with worse outcomes include preop narcotic usage, tobacco use, unmanaged depression/anxiety, noncompliance, pain out of proportion to radiographic findings, hyperalgesia/central sensitization/pain hypersensitivity/pain catastrophizing, poor coping skills and perseverance, morbid obesity, workers' compensation, etc.  Described the various approaches to the hip (posterolateral, direct lateral, direct anterior, etc.), including the pros and cons of each. Informed the patient that literature results are equivalent for the different approaches, and is therefore dictated on patient specific factors and surgeon preference    Baby ASA BID for VTE prophylaxis unless risk factors dictate stronger anticoagulant required such as Eliquis, Xarelto, or Lovenox    Informed patient every attempt will be made to match leg lengths within the constraints of the implant options available. However, at the time of surgery, priority will be given to stability of the SERENITY over leg lengths. Therefore, if the hip needs to be lengthened to increase soft tissue tensioning, and thus, decrease instability, operative limb may be lengthened compared to the contralateral side. This is not common. Risks and benefits of total hip arthroplasty were discussed at length with the patient and an opportunity for questions was afforded.   Possible complications of this surgery include, but are not limited to, dislocation, leg-length discrepancy, infection, stiffness, persistent pain, weakness, blood clots, component loosening and wear, periprosthetic fracture, neurovascular injury, bleeding, heterotopic ossification, wound healing problems. The patient demonstrated a good understanding of the procedure, anticipated outcomes, possible complications, the post-operative restrictions and possible therapy required, and at this time voiced desire to proceed. An informed consent form was signed in the office and the patient was given pre-operative instructions. Preoperative optimization and H&P from primary care physician will be required prior to surgery, preop joint arthroplasty class attendance is mandatory, and I will see the patient back in clinic for the first post-operative visit.   Prehab  Candidate for same day surgery      Tali Villalba MD

## 2022-01-19 NOTE — LETTER
Dr Atul Hernandez [phone] F: [phone] [facility]  Surgery Scheduling Form:  DEMOGRAPHICS:                                                                                                                  Patient Name:  Mike Ruiz    Patient :  1950   Patient SS#:  [id number]      Patient Phone:  [phone] (home)      Patient Address:  [place] [address]    Insurance: [employer]    DIAGNOSIS & PROCEDURE:                                                                                                Diagnosis:  Left hip arthritis M16.12    Operation:  Left total hip replacement - direct anterior 51819    Location:  [facility]    Surgeon:  Kathleen Hurtado    SCHEDULING INFORMATION:                                                                                         .    Requested Date:  22  OR Time: 10:00am  Patient Arrival Time: 8:00am    OR Time Required:  90  Minutes     1 1/2 hours    Anesthesia:  General    SA Required:  Yes x 2    Equipment:  Depuy Advanced  If direct anterior approach - HANA table with large C-arm  If lateral approach - lateral decubitus with peg board or hipGRIP®    Status:  Same day discharge  PAT Required:  Yes  COVID19 test:  Within 6 days of surgery     Latex Allergy:  no Defibrilator or Pacemaker:  no    Isolation Precautions:  no                    Kylie Arriaza MD     22   BILLING INFORMATION:                                                                                                    .                          CPT Code Modifier  Total hip    Prior auth:  [id number]

## 2022-01-24 ENCOUNTER — TELEPHONE (OUTPATIENT)
Dept: ORTHOPEDIC SURGERY | Age: 72
End: 2022-01-24

## 2022-01-24 NOTE — TELEPHONE ENCOUNTER
Surgery and/or Procedure Scheduling     Contact Name: Koko Allison  Surgical/Procedure Request: [address] [place] 02/22/22  Patient Contact Number: [phone]    PATIENT CALL TODAY AND IS REQUESTING TO HAVE HIS SURGERY CANCELLED HE STATED HE NEED TO STAY ON SOME MEDICATION THAT HIS PCP PUT HIM ON AND THAT IS THE REASON HE NEED TO CANCEL HIS SURGERY AND GET IT RESCHEDULED.

## 2022-01-27 ENCOUNTER — OFFICE VISIT (OUTPATIENT)
Dept: CARDIOLOGY CLINIC | Age: 72
End: 2022-01-27
Payer: MEDICARE

## 2022-01-27 ENCOUNTER — TELEPHONE (OUTPATIENT)
Dept: CARDIOLOGY CLINIC | Age: 72
End: 2022-01-27

## 2022-01-27 VITALS
WEIGHT: 200 LBS | HEART RATE: 70 BPM | BODY MASS INDEX: 29.62 KG/M2 | DIASTOLIC BLOOD PRESSURE: 100 MMHG | HEIGHT: 69 IN | SYSTOLIC BLOOD PRESSURE: 152 MMHG

## 2022-01-27 DIAGNOSIS — I10 HYPERTENSION, ESSENTIAL: ICD-10-CM

## 2022-01-27 DIAGNOSIS — R00.2 PALPITATION: ICD-10-CM

## 2022-01-27 DIAGNOSIS — R94.31 ABNORMAL EKG: Primary | ICD-10-CM

## 2022-01-27 PROCEDURE — 93000 ELECTROCARDIOGRAM COMPLETE: CPT | Performed by: INTERNAL MEDICINE

## 2022-01-27 PROCEDURE — 99214 OFFICE O/P EST MOD 30 MIN: CPT | Performed by: INTERNAL MEDICINE

## 2022-01-27 PROCEDURE — 93246 EXT ECG>7D<15D RECORDING: CPT | Performed by: INTERNAL MEDICINE

## 2022-01-27 RX ORDER — HYDRALAZINE HYDROCHLORIDE 25 MG/1
25 TABLET, FILM COATED ORAL 3 TIMES DAILY
Qty: 90 TABLET | Refills: 3 | Status: SHIPPED | OUTPATIENT
Start: 2022-01-27 | End: 2022-02-10

## 2022-01-27 RX ORDER — LOSARTAN POTASSIUM 25 MG/1
25 TABLET ORAL DAILY
COMMUNITY
End: 2022-02-10

## 2022-01-27 NOTE — PROGRESS NOTES
[facility]  Office Visit           Pascual Hebert MD,  [address] [facility]                      Cardiology           Adriano Christiansener  1950 January 27, 2022    Primary Cardiologist: [place] Velasquez     Patient is here today for cardiac clearance for left hip replacement. Apparently surgery had been canceled because his blood pressure was running too high and he has been started on losartan at 25 mg. Blood pressure is still high today. He did have some lightheadedness and dizziness and I am concerned because his EKG showed had degree second-degree AV block. Coronavirus vaccine none yet. And still will not receive vaccine    Coronavirus infection September 2021  Obstructive sleep apnea on CPAP  Hypertension  Chronic renal failure creatinine 1.7  First-degree AV block on EKG     blood clots both legs 9/21 on Eliquis       HLD optimal  GERD stable       complaint with meds   Discussed nutrition / sodium intake / fluid intake   Recommend activity as tolerated     Reviewed most recent test with pt. / stress nuclear study showing ejection fraction of 72% and was negative for ischemia       Review of Systems:  Constitutional: Denies  fatigue, weakness, night sweats or fever. HEENT: Denies new visual changes, ringing in ears, nosebleeds,nasal congestion  Respiratory: Denies new or change in SOB, PND, orthopnea or cough. Cardiovascular: see HPI  GI: Denies N/V, diarrhea, constipation, abdominal pain, change in bowel habits, melena or hematochezia  : Denies urinary frequency, urgency, incontinence, hematuria or dysuria. Skin: Denies rash, hives, or cyanosis  Musculoskeletal: Denies joint or muscle aches/pain  Neurological: Denies syncope or TIA-like symptoms.   Psychiatric: Denies anxiety, insomnia or depression     Past Medical History:   Diagnosis Date    Cancer ([facility] [address]     COLON    GERD without esophagitis 5/2/2019    Hypertension     Hypertension, essential 5/2/2019    Obstructive sleep apnea (adult) (pediatric)      Past Surgical History:   Procedure Laterality Date    CATARACT REMOVAL WITH IMPLANT  08/14/2012    phaco left eye/ IOL    CATARACT REMOVAL WITH IMPLANT  8/28/2012    Right     COLONOSCOPY      HERNIA REPAIR       TERI    SHOULDER SURGERY      LEFT    SHOULDER SURGERY  12-26-13    BILATERAL LEVATOR ADVANCEMENT, EXCISION OF MULTIPLE NEVIS ON    SMALL INTESTINE SURGERY      UPPER GASTROINTESTINAL ENDOSCOPY  04/19/2017    UPPER GASTROINTESTINAL ENDOSCOPY  06/06/2018    EUS, gastric nodule biopsy     Family History   Problem Relation Age of Onset    High Blood Pressure Mother      Social History     Tobacco Use    Smoking status: Never Smoker    Smokeless tobacco: Never Used   Vaping Use    Vaping Use: Never used   Substance Use Topics    Alcohol use: No    Drug use: No       No Known Allergies  Current Outpatient Medications   Medication Sig Dispense Refill    losartan (COZAAR) 25 MG tablet Take 25 mg by mouth daily      apixaban (ELIQUIS) 5 MG TABS tablet Take 5 mg by mouth 2 times daily      ferrous sulfate 325 (65 Fe) MG tablet Take 325 mg by mouth      Cholecalciferol (VITAMIN D) 2000 units CAPS capsule Take by mouth daily      amLODIPine (NORVASC) 10 MG tablet Take 10 mg by mouth daily. No current facility-administered medications for this visit. Physical Exam:   BP (!) 152/100   Pulse 70   Ht 5' 9\" (1.753 m)   Wt 200 lb (90.7 kg)   BMI 29.53 kg/m²   BP Readings from Last 3 Encounters:   01/27/22 (!) 152/100   11/23/21 (!) 142/88   11/16/21 122/64     Pulse Readings from Last 3 Encounters:   01/27/22 70   11/23/21 72   11/16/21 76     Wt Readings from Last 3 Encounters:   01/27/22 200 lb (90.7 kg)   01/19/22 207 lb (93.9 kg)   01/13/22 207 lb 12.8 oz (94.3 kg)     Constitutional: He is oriented to person, place, and time. He appears well-developed and well-nourished. In no acute distress. HEENT: Normocephalic and atraumatic. Sclerae anicteric.  No xanthelasmas. Conjunctiva white, no subconjunctival hemorrhage   External inspection of ears nose teeth & gums   Eyes:PERRLA EOM's intact. Neck: Neck supple. No JVD present. Carotids without bruits. No mass and no thyromegaly present. No lymphadenopathy present. Cardiovascular: RRR, normal S1 and S2; no murmur/gallop or rub, PMI nondisplaced  Pulmonary/Chest: Effort normal.  Lungs clear to auscultation. Chest wall nontender  Abdominal: soft, nontender, nondistended. + bowel sounds; no organomegaly or bruits. Aorta normal  Extremities: No edema, cyanosis, or clubbing. Pulses are 2+ radial/carotid/dorsalis pedis bilaterally. Cap refill brisk. Neurological: No cranial nerve deficit. Psychiatric: He has a normal mood and affect. His speech is normal and behavior is normal.     Lab Review:   Lab Results   Component Value Date    TRIG 78 12/05/2019    HDL 61 12/05/2019    LDLCALC 120 12/05/2019    LABVLDL 16 12/05/2019     Lab Results   Component Value Date     05/24/2021    K 3.5 05/24/2021    K 3.6 04/26/2019     05/24/2021    CO2 24 05/24/2021    BUN 24 05/24/2021    CREATININE 1.7 05/24/2021    GLUCOSE 88 05/24/2021    CALCIUM 9.1 05/24/2021      Lab Results   Component Value Date    WBC 6.7 12/05/2019    HGB 13.7 12/05/2019    HCT 41.3 12/05/2019    MCV 89.3 12/05/2019     12/05/2019   EKG on       7/15/21    No ischemic EKG changes with exercise. Less than 0.5 mm st    deviation with exercise. Marked 1st degree av block and    intermittent 2nd degree av block, KY interval improves with    exercise. Summary 7/15/21   Left ventricular cavity size is normal with moderate concentric left   ventricular hypertrophy. Overall, left ventricular systolic function appears normal.   Ejection fraction is visually estimated to be 60-65%. No regional wall motion abnormalities are noted. Diastolic filling parameters suggests grade II diastolic dysfunction. Trivial mitral regurgitation.    Mild tricuspid regurgitation with a PASP of 25 mmHg. EKG on 1/27/2022 sinus rhythm 70/min with prolonged first-degree AV block. May be nearly second-degree A-V block Rufino. 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 Assessment:    MC  uses CPAP now & states feels better / no c/o cp SOB edema / pleasant / active      HTN   optimal   EKG abnormal 1st degree av block /stable   stress nuclear study showing ejection fraction of 72% and was negative for ischemia. EKG on 7/2/2020 sinus rhythm 71/min. First-degree AV block. 1 PVC. Nonspecific ST and T wave changes. Hx syncope with drinking much alcohol / has not has any c/o any presyncope or syncope  states was drinking too much & stopped drinking / feels much better     HLD   Optimal    GERD  Stable     Plan: We will continue Eliquis for the time being because of recent blood clots to the legs. His EKG today does show high degree second-degree AV block. I am concerning because of his episodes of lightheadedness and dizziness whether there is significant dysrhythmias. We will get we will get a monitor for 2 weeks. Initiate hydralazine 20 mg 3 times daily for his blood pressure. Advised him again to get the coronavirus vaccine. We will see how his blood pressure goes as far as the potential for the surgery.    Pako Sanon MD, [facility]

## 2022-02-09 ENCOUNTER — TELEPHONE (OUTPATIENT)
Dept: CARDIOLOGY CLINIC | Age: 72
End: 2022-02-09

## 2022-02-10 ENCOUNTER — OFFICE VISIT (OUTPATIENT)
Dept: CARDIOLOGY CLINIC | Age: 72
End: 2022-02-10
Payer: MEDICARE

## 2022-02-10 VITALS
OXYGEN SATURATION: 97 % | SYSTOLIC BLOOD PRESSURE: 140 MMHG | WEIGHT: 210 LBS | HEART RATE: 84 BPM | HEIGHT: 69 IN | DIASTOLIC BLOOD PRESSURE: 76 MMHG | BODY MASS INDEX: 31.1 KG/M2

## 2022-02-10 DIAGNOSIS — I10 HYPERTENSION, ESSENTIAL: Primary | ICD-10-CM

## 2022-02-10 PROCEDURE — 99214 OFFICE O/P EST MOD 30 MIN: CPT | Performed by: NURSE PRACTITIONER

## 2022-02-10 RX ORDER — HYDROCHLOROTHIAZIDE 12.5 MG/1
12.5 CAPSULE, GELATIN COATED ORAL EVERY MORNING
Qty: 30 CAPSULE | Refills: 5 | Status: SHIPPED | OUTPATIENT
Start: 2022-02-10 | End: 2022-03-24 | Stop reason: ALTCHOICE

## 2022-02-10 RX ORDER — CARVEDILOL 12.5 MG/1
12.5 TABLET ORAL 2 TIMES DAILY WITH MEALS
Qty: 60 TABLET | Refills: 5 | Status: SHIPPED | OUTPATIENT
Start: 2022-02-10 | End: 2022-08-04

## 2022-02-10 NOTE — PROGRESS NOTES
[facility]   Outpatient Follow Up Note    CHIEF COMPLAINT / HPI:  Edison Jones is 73 y.o. male who presents today with a history of HTN optimal   Hx of EKG abnormal 1st degree av block /stable   Hx syncope with drinking much alcohol that day / has not has any c/o any presyncope or syncope  states was drinking too much & stopped drinking / feels much better   HLD optimal  GERD stable     Neg stress test 2019   EKG today NSR 1st degee av block similar to last EKG   No c/o cp/sOB/edmea/PND    Has MC wears CPAP   states had Covid in Sept 21   No Covid vaccines per pt request     TTE 7/15/2021 Left ventricular cavity size is normal with moderate concentric left ventricular hypertrophy. Overall, left ventricular systolic function appears normal.  Ejection fraction is visually estimated to be 60-65%. No regional wall motion abnormalities are noted. Diastolic filling parameters suggests grade II diastolic dysfunction. Trivial mitral regurgitation. Mild tricuspid regurgitation with a PASP of 25 mmHg.     Interval hx: c/o high b/p and h/a over a month up to 170/100   wt stable / no c/o cp/SOB/edema PND   complaint with meds   Discussed nutrition / sodium intake / fluid intake   Recommend activity as tolerated     Past Medical History:   Diagnosis Date    Cancer ([facility] [address]     COLON    GERD without esophagitis 5/2/2019    Hypertension     Hypertension, essential 5/2/2019    Obstructive sleep apnea (adult) (pediatric)        Social History     Tobacco Use   Smoking Status Never Smoker   Smokeless Tobacco Never Used     Current Medications:  Current Outpatient Medications   Medication Sig Dispense Refill    losartan (COZAAR) 25 MG tablet Take 25 mg by mouth daily      hydrALAZINE (APRESOLINE) 25 MG tablet Take 1 tablet by mouth 3 times daily 90 tablet 3    apixaban (ELIQUIS) 5 MG TABS tablet Take 5 mg by mouth 2 times daily      ferrous sulfate 325 (65 Fe) MG tablet Take 325 mg by mouth      05/24/2021    K 3.5 05/24/2021     05/24/2021    CO2 24 05/24/2021     Lab Results   Component Value Date    TSH 1.450 03/29/2019     Lab Results   Component Value Date    WBC 6.7 12/05/2019    HGB 13.7 12/05/2019    HCT 41.3 12/05/2019    MCV 89.3 12/05/2019     12/05/2019       Lab Results   Component Value Date    TRIG 78 12/05/2019     Lab Results   Component Value Date    HDL 61 (H) 12/05/2019     Lab Results   Component Value Date    LDLCALC 120 (H) 12/05/2019     Lab Results   Component Value Date    LABVLDL 16 12/05/2019   Radiology Review:  Pertinent images / reports were reviewed as a part of this visit and reveals the following:    Last EKG Echo:Last Stress Test:Last Angiogram:reviewed     Assessment:     MC  uses CPAP now & states feels better / no c/o cp SOB edema / pleasant / active       HTN  on high side   EKG abnormal 1st degree av block /stable   stress nuclear study showing ejection fraction of 72% and was negative for ischemia.       Hx syncope with drinking much alcohol / has not has any c/o any presyncope or syncope  states was drinking too much & stopped drinking / feels much better     CRI  sCr 1.9 >> 1.7  Followed by Marisela SHETTY    12/5/2019  / recheck / not on any statins   discussed diet & activity      GERD  Stable      Plan:  on cozaar apresoline Norvasc  c/o high b/p and h/a over a month   States hydralazine not working and hard to remember to take TID  Stop hydralazine / hold ace/arb due to CRI last sCr 1.7 /will repeat today   Hold Losartan until sees Dr Brent Shepherd   start coreg 12.5 mg BID and HCTZ 12.5mg daily  states wife will take b/ps she is a RN / to call if issues    Fu in March as planned / blood work PTV   balaji just turned in     Thank you for allowing us to participate in the care of Yorn.     Kely Barb APRN FNP [address]     Documentation of today's visit sent to PCP

## 2022-02-10 NOTE — PATIENT INSTRUCTIONS
Stop hydralazine / hold ace/arb due to CRI last sCr 1.7 /will repeat today   Hold Losartan until sees Dr Spencer Johnson   start coreg 12.5 mg BID and HCTZ 12.5mg daily  states wife will take b/ps she is a RN / to call if issues    Fu in March as planned / blood work PTV

## 2022-02-17 ENCOUNTER — HOSPITAL ENCOUNTER (OUTPATIENT)
Age: 72
Discharge: HOME | End: 2022-02-17
Payer: MEDICARE

## 2022-02-17 DIAGNOSIS — E66.9 CLASS 1 OBESITY WITHOUT SERIOUS COMORBIDITY WITH BODY MASS INDEX (BMI) OF 30.0 TO 30.9 IN ADULT, UNSPECIFIED OBESITY TYPE: Chronic | ICD-10-CM

## 2022-02-17 DIAGNOSIS — R00.1 BRADYCARDIA: ICD-10-CM

## 2022-02-17 DIAGNOSIS — N17.9 AKI (ACUTE KIDNEY INJURY) (HCC): ICD-10-CM

## 2022-02-17 DIAGNOSIS — R94.31 ABNORMAL EKG: ICD-10-CM

## 2022-02-17 DIAGNOSIS — I10 HYPERTENSION, ESSENTIAL: Chronic | ICD-10-CM

## 2022-02-17 LAB
A/G RATIO: 1.3 (ref 1.1–2.2)
ALBUMIN SERPL-MCNC: 3.9 G/DL (ref 3.4–5)
ALP BLD-CCNC: 103 U/L (ref 40–129)
ALT SERPL-CCNC: 19 U/L (ref 10–40)
ANION GAP SERPL CALCULATED.3IONS-SCNC: 14 MMOL/L (ref 3–16)
APTT: 41.5 SEC (ref 26.2–38.6)
AST SERPL-CCNC: 20 U/L (ref 15–37)
BASOPHILS ABSOLUTE: 0 K/UL (ref 0–0.2)
BASOPHILS RELATIVE PERCENT: 0.6 %
BILIRUB SERPL-MCNC: 0.8 MG/DL (ref 0–1)
BILIRUBIN DIRECT: <0.2 MG/DL (ref 0–0.3)
BILIRUBIN URINE: NEGATIVE
BILIRUBIN, INDIRECT: NORMAL MG/DL (ref 0–1)
BLOOD, URINE: ABNORMAL
BUN BLDV-MCNC: 26 MG/DL (ref 7–20)
CALCIUM SERPL-MCNC: 9.4 MG/DL (ref 8.3–10.6)
CHLORIDE BLD-SCNC: 101 MMOL/L (ref 99–110)
CHOLESTEROL, TOTAL: 194 MG/DL (ref 0–199)
CLARITY: CLEAR
CO2: 26 MMOL/L (ref 21–32)
COLOR: YELLOW
CREAT SERPL-MCNC: 1.6 MG/DL (ref 0.8–1.3)
EOSINOPHILS ABSOLUTE: 0.1 K/UL (ref 0–0.6)
EOSINOPHILS RELATIVE PERCENT: 2 %
EPITHELIAL CELLS, UA: 1 /HPF (ref 0–5)
FOLATE: 8.65 NG/ML (ref 4.78–24.2)
GFR AFRICAN AMERICAN: 52
GFR NON-AFRICAN AMERICAN: 43
GLUCOSE BLD-MCNC: 104 MG/DL (ref 70–99)
GLUCOSE URINE: NEGATIVE MG/DL
HCT VFR BLD CALC: 41.8 % (ref 40.5–52.5)
HDLC SERPL-MCNC: 50 MG/DL (ref 40–60)
HEMOGLOBIN: 14 G/DL (ref 13.5–17.5)
HYALINE CASTS: 5 /LPF (ref 0–8)
INR BLD: 1.38 (ref 0.88–1.12)
KETONES, URINE: NEGATIVE MG/DL
LDL CHOLESTEROL CALCULATED: 125 MG/DL
LEUKOCYTE ESTERASE, URINE: NEGATIVE
LYMPHOCYTES ABSOLUTE: 1.3 K/UL (ref 1–5.1)
LYMPHOCYTES RELATIVE PERCENT: 18.4 %
MAGNESIUM: 2.1 MG/DL (ref 1.8–2.4)
MCH RBC QN AUTO: 28.5 PG (ref 26–34)
MCHC RBC AUTO-ENTMCNC: 33.5 G/DL (ref 31–36)
MCV RBC AUTO: 85 FL (ref 80–100)
MICROSCOPIC EXAMINATION: YES
MONOCYTES ABSOLUTE: 0.6 K/UL (ref 0–1.3)
MONOCYTES RELATIVE PERCENT: 8.1 %
NEUTROPHILS ABSOLUTE: 5.2 K/UL (ref 1.7–7.7)
NEUTROPHILS RELATIVE PERCENT: 70.9 %
NITRITE, URINE: NEGATIVE
PDW BLD-RTO: 13.3 % (ref 12.4–15.4)
PH UA: 5.5 (ref 5–8)
PHOSPHORUS: 4.1 MG/DL (ref 2.5–4.9)
PLATELET # BLD: 186 K/UL (ref 135–450)
PMV BLD AUTO: 8.5 FL (ref 5–10.5)
POTASSIUM SERPL-SCNC: 3.5 MMOL/L (ref 3.5–5.1)
PRO-BNP: 196 PG/ML (ref 0–124)
PROTEIN UA: NEGATIVE MG/DL
PROTHROMBIN TIME: 15.8 SEC (ref 9.9–12.7)
RBC # BLD: 4.92 M/UL (ref 4.2–5.9)
RBC UA: 2 /HPF (ref 0–4)
SODIUM BLD-SCNC: 141 MMOL/L (ref 136–145)
SPECIFIC GRAVITY UA: 1.02 (ref 1–1.03)
TOTAL PROTEIN: 6.9 G/DL (ref 6.4–8.2)
TRIGL SERPL-MCNC: 96 MG/DL (ref 0–150)
TSH REFLEX FT4: 1.47 UIU/ML (ref 0.27–4.2)
URINE REFLEX TO CULTURE: NORMAL
URINE TYPE: ABNORMAL
UROBILINOGEN, URINE: 0.2 E.U./DL
VITAMIN B-12: 535 PG/ML (ref 211–911)
VITAMIN D 25-HYDROXY: 38.4 NG/ML
VLDLC SERPL CALC-MCNC: 19 MG/DL
WBC # BLD: 7.3 K/UL (ref 4–11)
WBC UA: 2 /HPF (ref 0–5)

## 2022-02-17 PROCEDURE — 84443 ASSAY THYROID STIM HORMONE: CPT

## 2022-02-17 PROCEDURE — 83036 HEMOGLOBIN GLYCOSYLATED A1C: CPT

## 2022-02-17 PROCEDURE — 82607 VITAMIN B-12: CPT

## 2022-02-17 PROCEDURE — 82746 ASSAY OF FOLIC ACID SERUM: CPT

## 2022-02-17 PROCEDURE — 85025 COMPLETE CBC W/AUTO DIFF WBC: CPT

## 2022-02-17 PROCEDURE — 87081 CULTURE SCREEN ONLY: CPT

## 2022-02-17 PROCEDURE — 80061 LIPID PANEL: CPT

## 2022-02-17 PROCEDURE — 85730 THROMBOPLASTIN TIME PARTIAL: CPT

## 2022-02-17 PROCEDURE — 80053 COMPREHEN METABOLIC PANEL: CPT

## 2022-02-17 PROCEDURE — 83880 ASSAY OF NATRIURETIC PEPTIDE: CPT

## 2022-02-17 PROCEDURE — 83735 ASSAY OF MAGNESIUM: CPT

## 2022-02-17 PROCEDURE — 85610 PROTHROMBIN TIME: CPT

## 2022-02-17 PROCEDURE — 81001 URINALYSIS AUTO W/SCOPE: CPT

## 2022-02-17 PROCEDURE — 82248 BILIRUBIN DIRECT: CPT

## 2022-02-17 PROCEDURE — 36415 COLL VENOUS BLD VENIPUNCTURE: CPT

## 2022-02-17 PROCEDURE — 84100 ASSAY OF PHOSPHORUS: CPT

## 2022-02-17 PROCEDURE — 82306 VITAMIN D 25 HYDROXY: CPT

## 2022-02-17 PROCEDURE — 81003 URINALYSIS AUTO W/O SCOPE: CPT

## 2022-02-17 NOTE — ASSESSMENT & PLAN NOTE
After downloading data and reviewing  Reviewed compliance download with pt. Supplies and parts as needed for his machine. These are medically necessary. Continue medications per his PCP and other physicians. Limit caffeine use after 3pm.    The chronic medical conditions listed are directly related to the primary diagnosis listed above. The management of the primary diagnosis affects the secondary diagnosis and vice versa    Patient is complaint encouraged to maintain compliance to aide on controlling other stated healthcare concerns.
Chronic- stable. After speaking with patient:    Agree with current plan, and would agree to continue this plan per prescribing and managing physician.
Chronic- stable. After speaking with patient:    Agree with current plan, and would agree to continue this plan per prescribing and managing physician.
Patient encouraged to work on maintaining a healthy weight per height. Achievable with diet restriction/modifications and exercise (may consult primary care if unsure of any restrictions or concerns). Weight management directly correlates to risk of control and maintenance of Obstructive Sleep Apnea.
no

## 2022-02-18 LAB
ESTIMATED AVERAGE GLUCOSE: 142.7 MG/DL
HBA1C MFR BLD: 6.6 %

## 2022-02-19 LAB — MRSA CULTURE ONLY: NORMAL

## 2022-02-21 ENCOUNTER — TELEPHONE (OUTPATIENT)
Dept: CARDIOLOGY CLINIC | Age: 72
End: 2022-02-21

## 2022-03-10 PROCEDURE — 93248 EXT ECG>7D<15D REV&INTERPJ: CPT | Performed by: INTERNAL MEDICINE

## 2022-03-11 ENCOUNTER — OFFICE VISIT (OUTPATIENT)
Dept: CARDIOLOGY CLINIC | Age: 72
End: 2022-03-11
Payer: MEDICARE

## 2022-03-11 ENCOUNTER — TELEPHONE (OUTPATIENT)
Dept: ORTHOPEDIC SURGERY | Age: 72
End: 2022-03-11

## 2022-03-11 VITALS
WEIGHT: 215 LBS | SYSTOLIC BLOOD PRESSURE: 132 MMHG | DIASTOLIC BLOOD PRESSURE: 64 MMHG | HEART RATE: 72 BPM | BODY MASS INDEX: 31.75 KG/M2

## 2022-03-11 DIAGNOSIS — I10 HYPERTENSION, ESSENTIAL: ICD-10-CM

## 2022-03-11 DIAGNOSIS — Z01.818 PRE-OP TESTING: Primary | ICD-10-CM

## 2022-03-11 DIAGNOSIS — R94.31 ABNORMAL EKG: ICD-10-CM

## 2022-03-11 PROCEDURE — 93000 ELECTROCARDIOGRAM COMPLETE: CPT | Performed by: INTERNAL MEDICINE

## 2022-03-11 PROCEDURE — 99214 OFFICE O/P EST MOD 30 MIN: CPT | Performed by: INTERNAL MEDICINE

## 2022-03-11 NOTE — PROGRESS NOTES
[facility]  Office Visit           Kristine Tinsley MD,  [facility] [place]                      Cardiology           Mike Ruiz  1950 March 11, 2022    Primary Cardiologist: Alisa Miller     Patient is here today for cardiac clearance for left hip replacement. He did have DVT in September thousand 21 and is on Eliquis since that time. At this time I think he is stable to proceed with his left hip surgery. States that he did have coronavirus infection last year and will not get the vaccine. Current chest pain no shortness of breath or dyspnea. The EKG is still abnormal showing some first-degree AV block but he has had no dizziness or blackout spells. Did have a stress test in July 2021 that was negative for ischemia. At this time I think it is okay to come off the Eliquis for the surgery. I would advise 3 days and at some point post surgery the surgeon might want him on anticoagulant again. Did have an event recorder for 12 days and 22 hours and February 2022 and essentially was unremarkable or other rare PAC and a rare PVC. First-degree AV block is noted and a 4 beat run of SVT. Coronavirus vaccine none yet. And still will not receive vaccine    Coronavirus infection September 2021    Obstructive sleep apnea on CPAP  Hypertension  Chronic renal failure creatinine 1.7  First-degree AV block on EKG     blood clots both legs 9/21 on Eliquis          HLD optimal  GERD stable       complaint with meds   Discussed nutrition / sodium intake / fluid intake   Recommend activity as tolerated     Reviewed most recent test with pt. / stress nuclear study showing ejection fraction of 72% and was negative for ischemia       Review of Systems:  Constitutional: Denies  fatigue, weakness, night sweats or fever. HEENT: Denies new visual changes, ringing in ears, nosebleeds,nasal congestion  Respiratory: Denies new or change in SOB, PND, orthopnea or cough.    Cardiovascular: see HPI  GI: Denies N/V, diarrhea, constipation, abdominal pain, change in bowel habits, melena or hematochezia  : Denies urinary frequency, urgency, incontinence, hematuria or dysuria. Skin: Denies rash, hives, or cyanosis  Musculoskeletal: Denies joint or muscle aches/pain  Neurological: Denies syncope or TIA-like symptoms.   Psychiatric: Denies anxiety, insomnia or depression     Past Medical History:   Diagnosis Date    Cancer ([facility] [address]     COLON    GERD without esophagitis 5/2/2019    Hypertension     Hypertension, essential 5/2/2019    Obstructive sleep apnea (adult) (pediatric)      Past Surgical History:   Procedure Laterality Date    CATARACT REMOVAL WITH IMPLANT  08/14/2012    phaco left eye/ IOL    CATARACT REMOVAL WITH IMPLANT  8/28/2012    Right     COLONOSCOPY      HERNIA REPAIR       TERI    SHOULDER SURGERY      LEFT    SHOULDER SURGERY  12-26-13    BILATERAL LEVATOR ADVANCEMENT, EXCISION OF MULTIPLE NEVIS ON    SMALL INTESTINE SURGERY      UPPER GASTROINTESTINAL ENDOSCOPY  04/19/2017    UPPER GASTROINTESTINAL ENDOSCOPY  06/06/2018    EUS, gastric nodule biopsy     Family History   Problem Relation Age of Onset    High Blood Pressure Mother      Social History     Tobacco Use    Smoking status: Never Smoker    Smokeless tobacco: Never Used   Vaping Use    Vaping Use: Never used   Substance Use Topics    Alcohol use: No    Drug use: No       No Known Allergies  Current Outpatient Medications   Medication Sig Dispense Refill    carvedilol (COREG) 12.5 MG tablet Take 1 tablet by mouth 2 times daily (with meals) 60 tablet 5    hydroCHLOROthiazide (MICROZIDE) 12.5 MG capsule Take 1 capsule by mouth every morning 30 capsule 5    apixaban (ELIQUIS) 5 MG TABS tablet Take 5 mg by mouth 2 times daily      ferrous sulfate 325 (65 Fe) MG tablet Take 325 mg by mouth      Cholecalciferol (VITAMIN D) 2000 units CAPS capsule Take by mouth daily      amLODIPine (NORVASC) 10 MG tablet Take 10 mg by mouth daily. No current facility-administered medications for this visit. Physical Exam:   /64   Pulse 72   Wt 215 lb (97.5 kg)   BMI 31.75 kg/m²   BP Readings from Last 3 Encounters:   03/11/22 132/64   02/10/22 (!) 140/76   01/27/22 (!) 152/100     Pulse Readings from Last 3 Encounters:   03/11/22 72   02/10/22 84   01/27/22 70     Wt Readings from Last 3 Encounters:   03/11/22 215 lb (97.5 kg)   02/10/22 210 lb (95.3 kg)   01/27/22 200 lb (90.7 kg)     Constitutional: He is oriented to person, place, and time. He appears well-developed and well-nourished. In no acute distress. HEENT: Normocephalic and atraumatic. Sclerae anicteric. No xanthelasmas. Conjunctiva white, no subconjunctival hemorrhage   External inspection of ears nose teeth & gums   Eyes:PERRLA EOM's intact. Neck: Neck supple. No JVD present. Carotids without bruits. No mass and no thyromegaly present. No lymphadenopathy present. Cardiovascular: RRR, normal S1 and S2; no murmur/gallop or rub, PMI nondisplaced  Pulmonary/Chest: Effort normal.  Lungs clear to auscultation. Chest wall nontender  Abdominal: soft, nontender, nondistended. + bowel sounds; no organomegaly or bruits. Aorta normal  Extremities: No edema, cyanosis, or clubbing. Pulses are 2+ radial/carotid/dorsalis pedis bilaterally. Cap refill brisk. Neurological: No cranial nerve deficit. Psychiatric: He has a normal mood and affect.  His speech is normal and behavior is normal.     Lab Review:   Lab Results   Component Value Date    TRIG 96 02/17/2022    HDL 50 02/17/2022    LDLCALC 125 02/17/2022    LABVLDL 19 02/17/2022     Lab Results   Component Value Date     02/17/2022    K 3.5 02/17/2022    K 3.6 04/26/2019     02/17/2022    CO2 26 02/17/2022    BUN 26 02/17/2022    CREATININE 1.6 02/17/2022    GLUCOSE 104 02/17/2022    CALCIUM 9.4 02/17/2022      Lab Results   Component Value Date    WBC 7.3 02/17/2022    HGB 14.0 02/17/2022    HCT 41.8 02/17/2022    MCV 85.0 02/17/2022     02/17/2022   EKG on       7/15/21    No ischemic EKG changes with exercise. Less than 0.5 mm st    deviation with exercise. Marked 1st degree av block and    intermittent 2nd degree av block, WI interval improves with    exercise. Summary 7/15/21   Left ventricular cavity size is normal with moderate concentric left   ventricular hypertrophy. Overall, left ventricular systolic function appears normal.   Ejection fraction is visually estimated to be 60-65%. No regional wall motion abnormalities are noted. Diastolic filling parameters suggests grade II diastolic dysfunction. Trivial mitral regurgitation. Mild tricuspid regurgitation with a PASP of 25 mmHg. EKG on 1/27/2022 sinus rhythm 70/min with prolonged first-degree AV block. May be nearly second-degree A-V block [place]. 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 Assessment:    MC  uses CPAP now & states feels better / no c/o cp SOB edema / pleasant / active      HTN   optimal   EKG abnormal 1st degree av block /stable   stress nuclear study showing ejection fraction of 72% and was negative for ischemia. EKG on 7/2/2020 sinus rhythm 71/min. First-degree AV block. 1 PVC. Nonspecific ST and T wave changes. 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 HLD   Optimal    GERD  Stable     Plan:  Clinically stable from our perspective to proceed with the left hip replacement surgery. I did advise again to get the coronavirus vaccine but it will yield no benefit. Advised to continue the Eliquis until 3 days prior to the surgical procedure and then we can interrupt it. It be okay to discontinue it after the surgery unless the surgeon wants to continue anticoagulation for a period of time longer.     I did advise him that at this point there is no indication for a pacemaker but I believe that at some point down the road for him a pacemaker may be indicated.    Roula Gan MD, [facility] [place]

## 2022-03-11 NOTE — TELEPHONE ENCOUNTER
Surgery and/or Procedure Scheduling     Contact Name: Cirilo Culp Request: Calling to reschedule his THR surgery.    Patient Contact Number: [phone]

## 2022-03-14 NOTE — TELEPHONE ENCOUNTER
Surgery and/or Procedure Scheduling     Contact Name: Celi Salamanca    Surgical/Procedure Request: SX  Patient Contact Number: [phone]  PT WILL NOT GET ANY CALLS THAT IS NOT IN HIS CONTACTS. HE -[phone]. OR SHOULD HE COME IN TO SCHEDULE IN PERSON?

## 2022-03-15 DIAGNOSIS — R00.2 PALPITATION: Primary | ICD-10-CM

## 2022-03-15 NOTE — TELEPHONE ENCOUNTER
Spoke with patient --  He would like to schedule surgery for first case of the day sometime in april

## 2022-03-18 ENCOUNTER — TELEPHONE (OUTPATIENT)
Dept: ORTHOPEDIC SURGERY | Age: 72
End: 2022-03-18

## 2022-03-18 NOTE — TELEPHONE ENCOUNTER
Called and spoke with his wife Erin Sanchez and he had his labs done on the 17th of feb. I did give her the # for umm sanchez since there were questions I was unable to answer.

## 2022-03-18 NOTE — PROGRESS NOTES
Name_______________________________________Printed:____________________  Date and time of surgery__4/12 1000______________________Arrival Time:__0800______________   1. The instructions given regarding when and if a patient needs to stop oral intake prior to surgery varies. Follow the specific instructions you were given                  _x__Nothing to eat or to drink after Midnight the night before.                   ____Carbo loading or ERAS instructions will be given to select patients-if you have been given those instructions -please do the following                           The evening before your surgery after dinner before midnight drink 40 ounces of gatorade. If you are diabetic use sugar free. The morning of surgery drink 40 ounces of water. This needs to be finished 3 hours prior to your surgery start time. 2. Take the following pills with a small sip of water on the morning of surgery__coreg norvasc_________________________________________________                  Do not take blood pressure medications ending in pril or sartan the [place] prior to surgery or the morning of surgery_   3. Aspirin, Ibuprofen, Advil, Naproxen, Vitamin E and other Anti-inflammatory products and supplements should be stopped for 5 -7days before surgery or as directed by your physician. 4. Check with your Doctor regarding stopping Plavix, Coumadin,Eliquis, Lovenox,Effient,Pradaxa,Xarelto, Fragmin or other blood thinners and follow their instructions. ELIQUIS FOLLOW DRS INSTRUCTIONS   5. Do not smoke, and do not drink any alcoholic beverages 24 hours prior to surgery. This includes NA Beer. Refrain from the usage of any recreational drugs. 6. You may brush your teeth and gargle the morning of surgery. DO NOT SWALLOW WATER   7. You MUST make arrangements for a responsible adult to stay on site while you are here and take you home after your surgery. You will not be allowed to leave alone or drive yourself home.   It is strongly suggested someone stay with you the first 24 hrs. Your surgery will be cancelled if you do not have a ride home. 8. A parent/legal guardian must accompany a child scheduled for surgery and plan to stay at the hospital until the child is discharged. Please do not bring other children with you. 9. Please wear simple, loose fitting clothing to the hospital.  Louvella Castleman not bring valuables (money, credit cards, checkbooks, etc.) Do not wear any makeup (including no eye makeup) or nail polish on your fingers or toes. 10. DO NOT wear any jewelry or piercings on day of surgery. All body piercing jewelry must be removed. 11. If you have ___dentures, they will be removed before going to the OR; we will provide you a container. If you wear ___contact lenses or ___glasses, they will be removed; please bring a case for them. 12. Please see your family doctor/pediatrician for a history & physical and/or concerning medications. Bring any test results/reports from your physician's office. PCP__________________Phone___________H&P Appt. Date________             13 If you  have a Living Will and Durable Power of  for Healthcare, please bring in a copy. 15. Notify your Surgeon if you develop any illness between now and surgery  time, cough, cold, fever, sore throat, nausea, vomiting, etc.  Please notify your surgeon if you experience dizziness, shortness of breath or blurred vision between now & the time of your surgery             15. DO NOT shave your operative site 96 hours prior to surgery. For face & neck surgery, men may use an electric razor 48 hours prior to surgery. 16. Shower the night before or morning of surgery using an antibacterial soap or as you have been instructed. 17. To provide excellent care visitors will be limited to one in the room at any given time. 18.  Please bring picture ID and insurance card.              19. Visit our web site for additional information:  [email or web address]              20.During flu season no children under the age of 15 are permitted in the hospital for the safety of all patients. 21. If you take a long acting insulin in the evening only  take half of your usual  dose the night  before your procedure              22. If you use a c-pap please bring DOS if staying overnight,             23.For your convenience [facility] has a pharmacy on site to fill your prescriptions. 24. If you use oxygen and have a portable tank please bring it  with you the DOS             25. Bring a complete list of all your medications with name and dose include any supplements. 26. Other__pats  Done  H/P to make appt________________________________________   *Please call pre admission testing if you any further questions   [address] [place] [address]. Airy  [phone]   [facility]    ___ Covid test to be done 3-5 days prior to scheduled surgery -patient aware they are REQUIRED to bring a copy of the negative result DOS-if they receive a positive result to notify their surgeon         If known - indicate where patient is getting covid test done ___________________________________________________________    ___ Rapid - DOS    _x__ Other___not needed________________________________      Dav Huertas POLICY(subject to change)    There is a one visitor policy at [address] [place] [address] for all surgeries and endoscopies. Whether the visitor can stay or will be asked to wait in the car will depend on the current policy and if social distancing can be maintained. The policy is subject to change at any time. Please make sure the visitor has a cell phone that is on,charged and able to accept calls, as this may be the way that the staff communicates with them. Pain management is NO VISITOR policyThe patients ride is expected to remain in the car with a cell phone for communication. If the ride is leaving the hospital grounds please make sure they are back in time for pickup. Have the patient inform the staff on arrival what their rides plans are while the patient is in the facility. At the MAIN there is one visitor allowed. Please note that the visitor policy is subject to change. All above information reviewed with patient in person or by phone. Patient verbalizes understanding. All questions and concerns addressed.                                                                                                  Patient/Rep____________________                                                                                                                        Per phone/pt            PRE OP INSTRUCTIONS

## 2022-03-18 NOTE — TELEPHONE ENCOUNTER
Called patient surgery scheduled for 04/12/2022 and will  info at [facility] office today

## 2022-03-18 NOTE — TELEPHONE ENCOUNTER
General Question     Subject: Wife called for patient, needing to know if he needs blood work before his surgery.    Patient and /or Facility Request: Clarissa Sparks (wife)  Contact Number: [phone]

## 2022-03-21 ENCOUNTER — HOSPITAL ENCOUNTER (OUTPATIENT)
Age: 72
Discharge: HOME | End: 2022-03-21
Payer: MEDICARE

## 2022-03-21 ENCOUNTER — TELEPHONE (OUTPATIENT)
Dept: ORTHOPEDIC SURGERY | Age: 72
End: 2022-03-21

## 2022-03-21 DIAGNOSIS — Z01.818 PREOP TESTING: ICD-10-CM

## 2022-03-21 LAB
A/G RATIO: 1.4 (ref 1.1–2.2)
ABO/RH: NORMAL
ALBUMIN SERPL-MCNC: 4.2 G/DL (ref 3.4–5)
ALP BLD-CCNC: 112 U/L (ref 40–129)
ALT SERPL-CCNC: 14 U/L (ref 10–40)
ANION GAP SERPL CALCULATED.3IONS-SCNC: 14 MMOL/L (ref 3–16)
ANTIBODY SCREEN: NORMAL
APTT: 43.7 SEC (ref 26.2–38.6)
AST SERPL-CCNC: 18 U/L (ref 15–37)
BILIRUB SERPL-MCNC: 0.5 MG/DL (ref 0–1)
BILIRUBIN URINE: NEGATIVE
BLOOD, URINE: ABNORMAL
BUN BLDV-MCNC: 16 MG/DL (ref 7–20)
CALCIUM SERPL-MCNC: 9.4 MG/DL (ref 8.3–10.6)
CHLORIDE BLD-SCNC: 102 MMOL/L (ref 99–110)
CLARITY: CLEAR
CO2: 25 MMOL/L (ref 21–32)
COLOR: YELLOW
CREAT SERPL-MCNC: 1.4 MG/DL (ref 0.8–1.3)
EPITHELIAL CELLS, UA: 0 /HPF (ref 0–5)
GFR AFRICAN AMERICAN: >60
GFR NON-AFRICAN AMERICAN: 50
GLUCOSE BLD-MCNC: 95 MG/DL (ref 70–99)
GLUCOSE URINE: NEGATIVE MG/DL
HCT VFR BLD CALC: 39 % (ref 40.5–52.5)
HEMOGLOBIN: 13.1 G/DL (ref 13.5–17.5)
HYALINE CASTS: 0 /LPF (ref 0–8)
INR BLD: 1.49 (ref 0.88–1.12)
KETONES, URINE: NEGATIVE MG/DL
LEUKOCYTE ESTERASE, URINE: NEGATIVE
MCH RBC QN AUTO: 28.7 PG (ref 26–34)
MCHC RBC AUTO-ENTMCNC: 33.6 G/DL (ref 31–36)
MCV RBC AUTO: 85.6 FL (ref 80–100)
MICROSCOPIC EXAMINATION: YES
NITRITE, URINE: NEGATIVE
PDW BLD-RTO: 13.5 % (ref 12.4–15.4)
PH UA: 6.5 (ref 5–8)
PLATELET # BLD: 219 K/UL (ref 135–450)
PMV BLD AUTO: 8.4 FL (ref 5–10.5)
POTASSIUM SERPL-SCNC: 3.3 MMOL/L (ref 3.5–5.1)
PROTEIN UA: NEGATIVE MG/DL
PROTHROMBIN TIME: 17.1 SEC (ref 9.9–12.7)
RBC # BLD: 4.56 M/UL (ref 4.2–5.9)
RBC UA: 2 /HPF (ref 0–4)
SODIUM BLD-SCNC: 141 MMOL/L (ref 136–145)
SPECIFIC GRAVITY UA: 1.02 (ref 1–1.03)
TOTAL PROTEIN: 7.1 G/DL (ref 6.4–8.2)
URINE TYPE: ABNORMAL
UROBILINOGEN, URINE: 0.2 E.U./DL
WBC # BLD: 7.5 K/UL (ref 4–11)
WBC UA: 1 /HPF (ref 0–5)

## 2022-03-21 PROCEDURE — 85610 PROTHROMBIN TIME: CPT

## 2022-03-21 PROCEDURE — 80053 COMPREHEN METABOLIC PANEL: CPT

## 2022-03-21 PROCEDURE — 86901 BLOOD TYPING SEROLOGIC RH(D): CPT

## 2022-03-21 PROCEDURE — 85730 THROMBOPLASTIN TIME PARTIAL: CPT

## 2022-03-21 PROCEDURE — 86850 RBC ANTIBODY SCREEN: CPT

## 2022-03-21 PROCEDURE — 83036 HEMOGLOBIN GLYCOSYLATED A1C: CPT

## 2022-03-21 PROCEDURE — 85027 COMPLETE CBC AUTOMATED: CPT

## 2022-03-21 PROCEDURE — 87081 CULTURE SCREEN ONLY: CPT

## 2022-03-21 PROCEDURE — 36415 COLL VENOUS BLD VENIPUNCTURE: CPT

## 2022-03-21 PROCEDURE — 81001 URINALYSIS AUTO W/SCOPE: CPT

## 2022-03-21 PROCEDURE — 87086 URINE CULTURE/COLONY COUNT: CPT

## 2022-03-21 PROCEDURE — 86900 BLOOD TYPING SEROLOGIC ABO: CPT

## 2022-03-21 NOTE — PROGRESS NOTES
Spoke to patient -he will come in for PATs today-he has appt with PCP 4/6/2022-will  soap/folder today

## 2022-03-21 NOTE — TELEPHONE ENCOUNTER
Surgery and/or Procedure Scheduling     Contact Name: Mauricio Culver  Surgical/Procedure Request: [address] ON 4/12/22 ON LT HIP  Patient Contact Number: [phone]    PATIENT HAS SOME QUESTION ABOUT HIM GETTING HIS PHYSICAL BEFORE SX HE IS HAVING A HARD TIME GETTING IN TO HIS PCP FOR HIS PHYSICAL HE JUST NEED TO KNOW WHAT HE SHOULD DO ABOUT THIS MATTER. PLEASE ADVISE.

## 2022-03-21 NOTE — TELEPHONE ENCOUNTER
Returned call to patient and he said he does nto have finances for PCP and he it trying to find a new PCP he will get back with me if he wants to cancel surgery or find a PCP

## 2022-03-22 ENCOUNTER — TELEPHONE (OUTPATIENT)
Dept: ORTHOPEDIC SURGERY | Age: 72
End: 2022-03-22

## 2022-03-22 LAB
ESTIMATED AVERAGE GLUCOSE: 148.5 MG/DL
HBA1C MFR BLD: 6.8 %
ORGANISM: ABNORMAL
URINE CULTURE, ROUTINE: ABNORMAL

## 2022-03-22 RX ORDER — TAMSULOSIN HYDROCHLORIDE 0.4 MG/1
0.4 CAPSULE ORAL DAILY
COMMUNITY
End: 2022-08-10

## 2022-03-23 LAB — MRSA CULTURE ONLY: NORMAL

## 2022-04-06 ENCOUNTER — TELEPHONE (OUTPATIENT)
Dept: ORTHOPEDIC SURGERY | Age: 72
End: 2022-04-06

## 2022-04-06 NOTE — TELEPHONE ENCOUNTER
[address] [place] Name: [address] [facility] Name: HUNG  Contact Number: [phone]  Request or Information: THERE IS AN ABN - DIAGNOSIS CODE IS NOT COVERED THROUGH INSURANCE

## 2022-04-11 NOTE — PROGRESS NOTES
Verified with patient arrival time tomorrow (8AM), npo at midnight tonight. Patient states he will NOT spend the night and will be going home tomorrow. Wife is a nurse at this hospital.  Patient has done 4 out of 5 CHF pre-op showers.

## 2022-04-12 ENCOUNTER — ANESTHESIA EVENT (OUTPATIENT)
Dept: OPERATING ROOM | Age: 72
End: 2022-04-12
Payer: MEDICARE

## 2022-04-12 ENCOUNTER — HOSPITAL ENCOUNTER (OUTPATIENT)
Age: 72
Setting detail: OUTPATIENT SURGERY
Discharge: HOME | End: 2022-04-12
Attending: ORTHOPAEDIC SURGERY | Admitting: ORTHOPAEDIC SURGERY
Payer: MEDICARE

## 2022-04-12 ENCOUNTER — ANESTHESIA (OUTPATIENT)
Dept: OPERATING ROOM | Age: 72
End: 2022-04-12
Payer: MEDICARE

## 2022-04-12 ENCOUNTER — APPOINTMENT (OUTPATIENT)
Dept: GENERAL RADIOLOGY | Age: 72
End: 2022-04-12
Attending: ORTHOPAEDIC SURGERY
Payer: MEDICARE

## 2022-04-12 VITALS
HEIGHT: 69 IN | TEMPERATURE: 97.1 F | BODY MASS INDEX: 31.25 KG/M2 | DIASTOLIC BLOOD PRESSURE: 82 MMHG | WEIGHT: 211 LBS | RESPIRATION RATE: 16 BRPM | HEART RATE: 66 BPM | SYSTOLIC BLOOD PRESSURE: 141 MMHG | OXYGEN SATURATION: 97 %

## 2022-04-12 VITALS
DIASTOLIC BLOOD PRESSURE: 92 MMHG | RESPIRATION RATE: 2 BRPM | SYSTOLIC BLOOD PRESSURE: 167 MMHG | TEMPERATURE: 95.7 F | OXYGEN SATURATION: 97 %

## 2022-04-12 DIAGNOSIS — Z01.818 PREOP TESTING: Primary | ICD-10-CM

## 2022-04-12 DIAGNOSIS — M16.12 ARTHRITIS OF LEFT HIP: ICD-10-CM

## 2022-04-12 DIAGNOSIS — M16.12 ARTHRITIS OF LEFT HIP: Primary | ICD-10-CM

## 2022-04-12 LAB
ABO/RH: NORMAL
ANTIBODY SCREEN: NORMAL
APTT: 38.1 SEC (ref 26.2–38.6)
GLUCOSE BLD-MCNC: 111 MG/DL (ref 70–99)
GLUCOSE BLD-MCNC: 125 MG/DL (ref 70–99)
INR BLD: 1.25 (ref 0.88–1.12)
PERFORMED ON: ABNORMAL
PERFORMED ON: ABNORMAL
PROTHROMBIN TIME: 14.2 SEC (ref 9.9–12.7)

## 2022-04-12 PROCEDURE — 6360000002 HC RX W HCPCS: Performed by: ANESTHESIOLOGY

## 2022-04-12 PROCEDURE — 2720000010 HC SURG SUPPLY STERILE: Performed by: ORTHOPAEDIC SURGERY

## 2022-04-12 PROCEDURE — 3700000000 HC ANESTHESIA ATTENDED CARE: Performed by: ORTHOPAEDIC SURGERY

## 2022-04-12 PROCEDURE — 2500000003 HC RX 250 WO HCPCS: Performed by: ORTHOPAEDIC SURGERY

## 2022-04-12 PROCEDURE — 6360000002 HC RX W HCPCS: Performed by: NURSE ANESTHETIST, CERTIFIED REGISTERED

## 2022-04-12 PROCEDURE — 6360000002 HC RX W HCPCS: Performed by: ORTHOPAEDIC SURGERY

## 2022-04-12 PROCEDURE — 7100000011 HC PHASE II RECOVERY - ADDTL 15 MIN: Performed by: ORTHOPAEDIC SURGERY

## 2022-04-12 PROCEDURE — 6370000000 HC RX 637 (ALT 250 FOR IP)

## 2022-04-12 PROCEDURE — A4217 STERILE WATER/SALINE, 500 ML: HCPCS | Performed by: ORTHOPAEDIC SURGERY

## 2022-04-12 PROCEDURE — 3600000005 HC SURGERY LEVEL 5 BASE: Performed by: ORTHOPAEDIC SURGERY

## 2022-04-12 PROCEDURE — 86850 RBC ANTIBODY SCREEN: CPT

## 2022-04-12 PROCEDURE — 2580000003 HC RX 258: Performed by: NURSE ANESTHETIST, CERTIFIED REGISTERED

## 2022-04-12 PROCEDURE — 27130 TOTAL HIP ARTHROPLASTY: CPT | Performed by: ORTHOPAEDIC SURGERY

## 2022-04-12 PROCEDURE — C1776 JOINT DEVICE (IMPLANTABLE): HCPCS | Performed by: ORTHOPAEDIC SURGERY

## 2022-04-12 PROCEDURE — 97116 GAIT TRAINING THERAPY: CPT

## 2022-04-12 PROCEDURE — 97162 PT EVAL MOD COMPLEX 30 MIN: CPT

## 2022-04-12 PROCEDURE — 7100000001 HC PACU RECOVERY - ADDTL 15 MIN: Performed by: ORTHOPAEDIC SURGERY

## 2022-04-12 PROCEDURE — 86900 BLOOD TYPING SEROLOGIC ABO: CPT

## 2022-04-12 PROCEDURE — 2500000003 HC RX 250 WO HCPCS: Performed by: NURSE ANESTHETIST, CERTIFIED REGISTERED

## 2022-04-12 PROCEDURE — 2580000003 HC RX 258: Performed by: ORTHOPAEDIC SURGERY

## 2022-04-12 PROCEDURE — 2580000003 HC RX 258: Performed by: ANESTHESIOLOGY

## 2022-04-12 PROCEDURE — 97165 OT EVAL LOW COMPLEX 30 MIN: CPT

## 2022-04-12 PROCEDURE — 7100000010 HC PHASE II RECOVERY - FIRST 15 MIN: Performed by: ORTHOPAEDIC SURGERY

## 2022-04-12 PROCEDURE — 3700000001 HC ADD 15 MINUTES (ANESTHESIA): Performed by: ORTHOPAEDIC SURGERY

## 2022-04-12 PROCEDURE — 73501 X-RAY EXAM HIP UNI 1 VIEW: CPT

## 2022-04-12 PROCEDURE — 36415 COLL VENOUS BLD VENIPUNCTURE: CPT

## 2022-04-12 PROCEDURE — 85730 THROMBOPLASTIN TIME PARTIAL: CPT

## 2022-04-12 PROCEDURE — 85610 PROTHROMBIN TIME: CPT

## 2022-04-12 PROCEDURE — 2709999900 HC NON-CHARGEABLE SUPPLY: Performed by: ORTHOPAEDIC SURGERY

## 2022-04-12 PROCEDURE — 6370000000 HC RX 637 (ALT 250 FOR IP): Performed by: ANESTHESIOLOGY

## 2022-04-12 PROCEDURE — 3600000015 HC SURGERY LEVEL 5 ADDTL 15MIN: Performed by: ORTHOPAEDIC SURGERY

## 2022-04-12 PROCEDURE — 86901 BLOOD TYPING SEROLOGIC RH(D): CPT

## 2022-04-12 PROCEDURE — 7100000000 HC PACU RECOVERY - FIRST 15 MIN: Performed by: ORTHOPAEDIC SURGERY

## 2022-04-12 DEVICE — HEAD FEM DIA36MM +8MM OFFSET 12/14 TAPR HIP CERAMIC BIOLOX: Type: IMPLANTABLE DEVICE | Site: HIP | Status: FUNCTIONAL

## 2022-04-12 DEVICE — LINER ACET OD52MM ID36MM +4MM OFFSET HIP POLYETH MTL ON: Type: IMPLANTABLE DEVICE | Site: HIP | Status: FUNCTIONAL

## 2022-04-12 DEVICE — STEM FEM SZ 5 HIP STD OFFSET CLLRD CEMENTLESS 12/14 TAPR: Type: IMPLANTABLE DEVICE | Site: HIP | Status: FUNCTIONAL

## 2022-04-12 DEVICE — CUP ACET DIA52MM HIP GRIPTION PRI CEMENTLESS FIX SECT SER: Type: IMPLANTABLE DEVICE | Site: HIP | Status: FUNCTIONAL

## 2022-04-12 RX ORDER — SODIUM CHLORIDE 9 MG/ML
25 INJECTION, SOLUTION INTRAVENOUS PRN
Status: DISCONTINUED | OUTPATIENT
Start: 2022-04-12 | End: 2022-04-12 | Stop reason: HOSPADM

## 2022-04-12 RX ORDER — NICOTINE POLACRILEX 4 MG
15 LOZENGE BUCCAL PRN
Status: DISCONTINUED | OUTPATIENT
Start: 2022-04-12 | End: 2022-04-12 | Stop reason: HOSPADM

## 2022-04-12 RX ORDER — MEPERIDINE HYDROCHLORIDE 25 MG/ML
12.5 INJECTION INTRAMUSCULAR; INTRAVENOUS; SUBCUTANEOUS EVERY 5 MIN PRN
Status: DISCONTINUED | OUTPATIENT
Start: 2022-04-12 | End: 2022-04-12

## 2022-04-12 RX ORDER — OXYCODONE HYDROCHLORIDE 5 MG/1
10 TABLET ORAL EVERY 4 HOURS PRN
Status: DISCONTINUED | OUTPATIENT
Start: 2022-04-12 | End: 2022-04-12 | Stop reason: HOSPADM

## 2022-04-12 RX ORDER — CELECOXIB 200 MG/1
200 CAPSULE ORAL ONCE
Status: COMPLETED | OUTPATIENT
Start: 2022-04-12 | End: 2022-04-12

## 2022-04-12 RX ORDER — KETAMINE HYDROCHLORIDE 10 MG/ML
INJECTION, SOLUTION INTRAMUSCULAR; INTRAVENOUS PRN
Status: DISCONTINUED | OUTPATIENT
Start: 2022-04-12 | End: 2022-04-12 | Stop reason: SDUPTHER

## 2022-04-12 RX ORDER — CEPHALEXIN 500 MG/1
500 CAPSULE ORAL DAILY
Qty: 2 CAPSULE | Refills: 0 | Status: SHIPPED | OUTPATIENT
Start: 2022-04-12 | End: 2022-08-29

## 2022-04-12 RX ORDER — LIDOCAINE HYDROCHLORIDE 10 MG/ML
1 INJECTION, SOLUTION EPIDURAL; INFILTRATION; INTRACAUDAL; PERINEURAL
Status: DISCONTINUED | OUTPATIENT
Start: 2022-04-12 | End: 2022-04-12

## 2022-04-12 RX ORDER — OXYCODONE HYDROCHLORIDE 5 MG/1
TABLET ORAL
Status: COMPLETED
Start: 2022-04-12 | End: 2022-04-12

## 2022-04-12 RX ORDER — SODIUM CHLORIDE, SODIUM LACTATE, POTASSIUM CHLORIDE, CALCIUM CHLORIDE 600; 310; 30; 20 MG/100ML; MG/100ML; MG/100ML; MG/100ML
INJECTION, SOLUTION INTRAVENOUS CONTINUOUS
Status: DISCONTINUED | OUTPATIENT
Start: 2022-04-12 | End: 2022-04-12

## 2022-04-12 RX ORDER — ONDANSETRON 2 MG/ML
INJECTION INTRAMUSCULAR; INTRAVENOUS PRN
Status: DISCONTINUED | OUTPATIENT
Start: 2022-04-12 | End: 2022-04-12 | Stop reason: SDUPTHER

## 2022-04-12 RX ORDER — ACETAMINOPHEN 325 MG/1
650 TABLET ORAL ONCE
Status: COMPLETED | OUTPATIENT
Start: 2022-04-12 | End: 2022-04-12

## 2022-04-12 RX ORDER — DIPHENHYDRAMINE HCL 25 MG
25 TABLET ORAL EVERY 6 HOURS PRN
Status: DISCONTINUED | OUTPATIENT
Start: 2022-04-12 | End: 2022-04-12 | Stop reason: HOSPADM

## 2022-04-12 RX ORDER — SODIUM CHLORIDE 9 MG/ML
INJECTION, SOLUTION INTRAVENOUS CONTINUOUS
Status: DISCONTINUED | OUTPATIENT
Start: 2022-04-12 | End: 2022-04-12 | Stop reason: HOSPADM

## 2022-04-12 RX ORDER — DEXTROSE MONOHYDRATE 50 MG/ML
100 INJECTION, SOLUTION INTRAVENOUS PRN
Status: DISCONTINUED | OUTPATIENT
Start: 2022-04-12 | End: 2022-04-12 | Stop reason: HOSPADM

## 2022-04-12 RX ORDER — DIPHENHYDRAMINE HYDROCHLORIDE 50 MG/ML
25 INJECTION INTRAMUSCULAR; INTRAVENOUS EVERY 6 HOURS PRN
Status: DISCONTINUED | OUTPATIENT
Start: 2022-04-12 | End: 2022-04-12 | Stop reason: HOSPADM

## 2022-04-12 RX ORDER — LABETALOL HYDROCHLORIDE 5 MG/ML
10 INJECTION, SOLUTION INTRAVENOUS
Status: DISCONTINUED | OUTPATIENT
Start: 2022-04-12 | End: 2022-04-12

## 2022-04-12 RX ORDER — ONDANSETRON 2 MG/ML
4 INJECTION INTRAMUSCULAR; INTRAVENOUS
Status: DISCONTINUED | OUTPATIENT
Start: 2022-04-12 | End: 2022-04-12

## 2022-04-12 RX ORDER — HYDRALAZINE HYDROCHLORIDE 20 MG/ML
10 INJECTION INTRAMUSCULAR; INTRAVENOUS
Status: DISCONTINUED | OUTPATIENT
Start: 2022-04-12 | End: 2022-04-12

## 2022-04-12 RX ORDER — OXYCODONE HYDROCHLORIDE 5 MG/1
5 TABLET ORAL EVERY 4 HOURS PRN
Status: DISCONTINUED | OUTPATIENT
Start: 2022-04-12 | End: 2022-04-12 | Stop reason: HOSPADM

## 2022-04-12 RX ORDER — APREPITANT 40 MG/1
40 CAPSULE ORAL ONCE
Status: COMPLETED | OUTPATIENT
Start: 2022-04-12 | End: 2022-04-12

## 2022-04-12 RX ORDER — DEXAMETHASONE SODIUM PHOSPHATE 4 MG/ML
INJECTION, SOLUTION INTRA-ARTICULAR; INTRALESIONAL; INTRAMUSCULAR; INTRAVENOUS; SOFT TISSUE PRN
Status: DISCONTINUED | OUTPATIENT
Start: 2022-04-12 | End: 2022-04-12 | Stop reason: SDUPTHER

## 2022-04-12 RX ORDER — HYDROMORPHONE HCL 110MG/55ML
0.5 PATIENT CONTROLLED ANALGESIA SYRINGE INTRAVENOUS EVERY 5 MIN PRN
Status: DISCONTINUED | OUTPATIENT
Start: 2022-04-12 | End: 2022-04-12

## 2022-04-12 RX ORDER — ACETAMINOPHEN 325 MG/1
325 TABLET ORAL EVERY 4 HOURS PRN
Status: DISCONTINUED | OUTPATIENT
Start: 2022-04-12 | End: 2022-04-12 | Stop reason: HOSPADM

## 2022-04-12 RX ORDER — PROMETHAZINE HYDROCHLORIDE 25 MG/1
12.5 TABLET ORAL EVERY 6 HOURS PRN
Status: DISCONTINUED | OUTPATIENT
Start: 2022-04-12 | End: 2022-04-12 | Stop reason: HOSPADM

## 2022-04-12 RX ORDER — LIDOCAINE HYDROCHLORIDE 10 MG/ML
0.5 INJECTION, SOLUTION EPIDURAL; INFILTRATION; INTRACAUDAL; PERINEURAL ONCE
Status: DISCONTINUED | OUTPATIENT
Start: 2022-04-12 | End: 2022-04-12

## 2022-04-12 RX ORDER — PROPOFOL 10 MG/ML
INJECTION, EMULSION INTRAVENOUS PRN
Status: DISCONTINUED | OUTPATIENT
Start: 2022-04-12 | End: 2022-04-12 | Stop reason: SDUPTHER

## 2022-04-12 RX ORDER — FENTANYL CITRATE 50 UG/ML
INJECTION, SOLUTION INTRAMUSCULAR; INTRAVENOUS PRN
Status: DISCONTINUED | OUTPATIENT
Start: 2022-04-12 | End: 2022-04-12 | Stop reason: SDUPTHER

## 2022-04-12 RX ORDER — MAGNESIUM HYDROXIDE 1200 MG/15ML
LIQUID ORAL
Status: COMPLETED | OUTPATIENT
Start: 2022-04-12 | End: 2022-04-12

## 2022-04-12 RX ORDER — VANCOMYCIN HYDROCHLORIDE 1 G/20ML
INJECTION, POWDER, LYOPHILIZED, FOR SOLUTION INTRAVENOUS
Status: COMPLETED | OUTPATIENT
Start: 2022-04-12 | End: 2022-04-12

## 2022-04-12 RX ORDER — SODIUM CHLORIDE 0.9 % (FLUSH) 0.9 %
10 SYRINGE (ML) INJECTION EVERY 12 HOURS SCHEDULED
Status: DISCONTINUED | OUTPATIENT
Start: 2022-04-12 | End: 2022-04-12 | Stop reason: HOSPADM

## 2022-04-12 RX ORDER — SENNA AND DOCUSATE SODIUM 50; 8.6 MG/1; MG/1
1 TABLET, FILM COATED ORAL 2 TIMES DAILY
Status: DISCONTINUED | OUTPATIENT
Start: 2022-04-12 | End: 2022-04-12 | Stop reason: HOSPADM

## 2022-04-12 RX ORDER — OXYCODONE HYDROCHLORIDE 5 MG/1
5 TABLET ORAL
Status: DISCONTINUED | OUTPATIENT
Start: 2022-04-12 | End: 2022-04-12

## 2022-04-12 RX ORDER — ROCURONIUM BROMIDE 10 MG/ML
INJECTION, SOLUTION INTRAVENOUS PRN
Status: DISCONTINUED | OUTPATIENT
Start: 2022-04-12 | End: 2022-04-12 | Stop reason: SDUPTHER

## 2022-04-12 RX ORDER — GLYCOPYRROLATE 1 MG/5 ML
SYRINGE (ML) INTRAVENOUS PRN
Status: DISCONTINUED | OUTPATIENT
Start: 2022-04-12 | End: 2022-04-12 | Stop reason: SDUPTHER

## 2022-04-12 RX ORDER — ONDANSETRON 2 MG/ML
4 INJECTION INTRAMUSCULAR; INTRAVENOUS EVERY 6 HOURS PRN
Status: DISCONTINUED | OUTPATIENT
Start: 2022-04-12 | End: 2022-04-12 | Stop reason: HOSPADM

## 2022-04-12 RX ORDER — SUCCINYLCHOLINE/SOD CL,ISO/PF 200MG/10ML
SYRINGE (ML) INTRAVENOUS PRN
Status: DISCONTINUED | OUTPATIENT
Start: 2022-04-12 | End: 2022-04-12 | Stop reason: SDUPTHER

## 2022-04-12 RX ORDER — POLYETHYLENE GLYCOL 3350 17 G/17G
17 POWDER, FOR SOLUTION ORAL DAILY PRN
Status: DISCONTINUED | OUTPATIENT
Start: 2022-04-12 | End: 2022-04-12 | Stop reason: HOSPADM

## 2022-04-12 RX ORDER — HYDROMORPHONE HCL 110MG/55ML
PATIENT CONTROLLED ANALGESIA SYRINGE INTRAVENOUS PRN
Status: DISCONTINUED | OUTPATIENT
Start: 2022-04-12 | End: 2022-04-12 | Stop reason: SDUPTHER

## 2022-04-12 RX ORDER — MORPHINE SULFATE 2 MG/ML
4 INJECTION, SOLUTION INTRAMUSCULAR; INTRAVENOUS EVERY 4 HOURS PRN
Status: DISCONTINUED | OUTPATIENT
Start: 2022-04-12 | End: 2022-04-12 | Stop reason: HOSPADM

## 2022-04-12 RX ORDER — SODIUM CHLORIDE 0.9 % (FLUSH) 0.9 %
10 SYRINGE (ML) INJECTION PRN
Status: DISCONTINUED | OUTPATIENT
Start: 2022-04-12 | End: 2022-04-12 | Stop reason: HOSPADM

## 2022-04-12 RX ORDER — INSULIN GLARGINE 100 [IU]/ML
0.25 INJECTION, SOLUTION SUBCUTANEOUS NIGHTLY
Status: DISCONTINUED | OUTPATIENT
Start: 2022-04-12 | End: 2022-04-12 | Stop reason: HOSPADM

## 2022-04-12 RX ORDER — SODIUM CHLORIDE, SODIUM LACTATE, POTASSIUM CHLORIDE, CALCIUM CHLORIDE 600; 310; 30; 20 MG/100ML; MG/100ML; MG/100ML; MG/100ML
INJECTION, SOLUTION INTRAVENOUS CONTINUOUS PRN
Status: DISCONTINUED | OUTPATIENT
Start: 2022-04-12 | End: 2022-04-12 | Stop reason: SDUPTHER

## 2022-04-12 RX ORDER — EPHEDRINE SULFATE/0.9% NACL/PF 50 MG/5 ML
SYRINGE (ML) INTRAVENOUS PRN
Status: DISCONTINUED | OUTPATIENT
Start: 2022-04-12 | End: 2022-04-12 | Stop reason: SDUPTHER

## 2022-04-12 RX ORDER — MORPHINE SULFATE 2 MG/ML
2 INJECTION, SOLUTION INTRAMUSCULAR; INTRAVENOUS EVERY 4 HOURS PRN
Status: DISCONTINUED | OUTPATIENT
Start: 2022-04-12 | End: 2022-04-12 | Stop reason: HOSPADM

## 2022-04-12 RX ORDER — OXYCODONE HYDROCHLORIDE AND ACETAMINOPHEN 5; 325 MG/1; MG/1
1 TABLET ORAL EVERY 4 HOURS PRN
Qty: 42 TABLET | Refills: 0 | Status: SHIPPED | OUTPATIENT
Start: 2022-04-12 | End: 2022-04-19

## 2022-04-12 RX ORDER — DEXMEDETOMIDINE HYDROCHLORIDE 100 UG/ML
INJECTION, SOLUTION INTRAVENOUS PRN
Status: DISCONTINUED | OUTPATIENT
Start: 2022-04-12 | End: 2022-04-12 | Stop reason: SDUPTHER

## 2022-04-12 RX ORDER — DEXTROSE MONOHYDRATE 25 G/50ML
12.5 INJECTION, SOLUTION INTRAVENOUS PRN
Status: DISCONTINUED | OUTPATIENT
Start: 2022-04-12 | End: 2022-04-12 | Stop reason: HOSPADM

## 2022-04-12 RX ADMIN — ONDANSETRON 4 MG: 2 INJECTION INTRAMUSCULAR; INTRAVENOUS at 10:32

## 2022-04-12 RX ADMIN — Medication 0.2 MG: at 10:25

## 2022-04-12 RX ADMIN — Medication 10 MG: at 10:14

## 2022-04-12 RX ADMIN — SODIUM CHLORIDE, POTASSIUM CHLORIDE, SODIUM LACTATE AND CALCIUM CHLORIDE: 600; 310; 30; 20 INJECTION, SOLUTION INTRAVENOUS at 08:56

## 2022-04-12 RX ADMIN — OXYCODONE HYDROCHLORIDE 5 MG: 5 TABLET ORAL at 14:12

## 2022-04-12 RX ADMIN — ACETAMINOPHEN 325MG 650 MG: 325 TABLET ORAL at 08:23

## 2022-04-12 RX ADMIN — ROCURONIUM BROMIDE 10 MG: 10 INJECTION, SOLUTION INTRAVENOUS at 10:05

## 2022-04-12 RX ADMIN — HYDROMORPHONE HYDROCHLORIDE 0.5 MG: 2 INJECTION, SOLUTION INTRAMUSCULAR; INTRAVENOUS; SUBCUTANEOUS at 11:08

## 2022-04-12 RX ADMIN — Medication 10 MG: at 10:23

## 2022-04-12 RX ADMIN — TRANEXAMIC ACID 1000 MG: 1 INJECTION, SOLUTION INTRAVENOUS at 11:33

## 2022-04-12 RX ADMIN — FENTANYL CITRATE 50 MCG: 50 INJECTION, SOLUTION INTRAMUSCULAR; INTRAVENOUS at 10:23

## 2022-04-12 RX ADMIN — SUGAMMADEX 200 MG: 100 INJECTION, SOLUTION INTRAVENOUS at 11:53

## 2022-04-12 RX ADMIN — OXYCODONE 5 MG: 5 TABLET ORAL at 14:12

## 2022-04-12 RX ADMIN — SODIUM CHLORIDE, POTASSIUM CHLORIDE, SODIUM LACTATE AND CALCIUM CHLORIDE: 600; 310; 30; 20 INJECTION, SOLUTION INTRAVENOUS at 09:55

## 2022-04-12 RX ADMIN — DEXMEDETOMIDINE HYDROCHLORIDE 20 MCG: 100 INJECTION, SOLUTION INTRAVENOUS at 11:39

## 2022-04-12 RX ADMIN — CEFAZOLIN 2000 MG: 10 INJECTION, POWDER, FOR SOLUTION INTRAVENOUS at 10:05

## 2022-04-12 RX ADMIN — ROCURONIUM BROMIDE 40 MG: 10 INJECTION, SOLUTION INTRAVENOUS at 10:16

## 2022-04-12 RX ADMIN — APREPITANT 40 MG: 40 CAPSULE ORAL at 08:24

## 2022-04-12 RX ADMIN — FENTANYL CITRATE 50 MCG: 50 INJECTION, SOLUTION INTRAMUSCULAR; INTRAVENOUS at 10:04

## 2022-04-12 RX ADMIN — DEXAMETHASONE SODIUM PHOSPHATE 4 MG: 4 INJECTION, SOLUTION INTRAMUSCULAR; INTRAVENOUS at 10:32

## 2022-04-12 RX ADMIN — PROPOFOL 150 MG: 10 INJECTION, EMULSION INTRAVENOUS at 10:05

## 2022-04-12 RX ADMIN — Medication 0.2 MG: at 10:20

## 2022-04-12 RX ADMIN — Medication 200 MG: at 10:05

## 2022-04-12 RX ADMIN — KETAMINE HYDROCHLORIDE 20 MG: 10 INJECTION, SOLUTION INTRAMUSCULAR; INTRAVENOUS at 10:38

## 2022-04-12 RX ADMIN — CELECOXIB 200 MG: 200 CAPSULE ORAL at 08:23

## 2022-04-12 RX ADMIN — TRANEXAMIC ACID 1000 MG: 1 INJECTION, SOLUTION INTRAVENOUS at 09:50

## 2022-04-12 ASSESSMENT — PULMONARY FUNCTION TESTS
PIF_VALUE: 25
PIF_VALUE: 26
PIF_VALUE: 25
PIF_VALUE: 2
PIF_VALUE: 22
PIF_VALUE: 26
PIF_VALUE: 25
PIF_VALUE: 26
PIF_VALUE: 26
PIF_VALUE: 25
PIF_VALUE: 25
PIF_VALUE: 0
PIF_VALUE: 25
PIF_VALUE: 24
PIF_VALUE: 22
PIF_VALUE: 27
PIF_VALUE: 0
PIF_VALUE: 25
PIF_VALUE: 26
PIF_VALUE: 26
PIF_VALUE: 25
PIF_VALUE: 29
PIF_VALUE: 2
PIF_VALUE: 25
PIF_VALUE: 24
PIF_VALUE: 27
PIF_VALUE: 25
PIF_VALUE: 27
PIF_VALUE: 25
PIF_VALUE: 0
PIF_VALUE: 24
PIF_VALUE: 25
PIF_VALUE: 25
PIF_VALUE: 9
PIF_VALUE: 28
PIF_VALUE: 25
PIF_VALUE: 26
PIF_VALUE: 26
PIF_VALUE: 21
PIF_VALUE: 0
PIF_VALUE: 0
PIF_VALUE: 27
PIF_VALUE: 27
PIF_VALUE: 25
PIF_VALUE: 26
PIF_VALUE: 26
PIF_VALUE: 28
PIF_VALUE: 25
PIF_VALUE: 14
PIF_VALUE: 24
PIF_VALUE: 26
PIF_VALUE: 0
PIF_VALUE: 26
PIF_VALUE: 25
PIF_VALUE: 26
PIF_VALUE: 25
PIF_VALUE: 25
PIF_VALUE: 27
PIF_VALUE: 24
PIF_VALUE: 26
PIF_VALUE: 25
PIF_VALUE: 24
PIF_VALUE: 19
PIF_VALUE: 25
PIF_VALUE: 3
PIF_VALUE: 4
PIF_VALUE: 3
PIF_VALUE: 26
PIF_VALUE: 27
PIF_VALUE: 25
PIF_VALUE: 27
PIF_VALUE: 33
PIF_VALUE: 26
PIF_VALUE: 25
PIF_VALUE: 18
PIF_VALUE: 25
PIF_VALUE: 25
PIF_VALUE: 27
PIF_VALUE: 29
PIF_VALUE: 26
PIF_VALUE: 25
PIF_VALUE: 25
PIF_VALUE: 26
PIF_VALUE: 26
PIF_VALUE: 25
PIF_VALUE: 26
PIF_VALUE: 3
PIF_VALUE: 26
PIF_VALUE: 24
PIF_VALUE: 26
PIF_VALUE: 1
PIF_VALUE: 24
PIF_VALUE: 26
PIF_VALUE: 26
PIF_VALUE: 25
PIF_VALUE: 26
PIF_VALUE: 25
PIF_VALUE: 24
PIF_VALUE: 26
PIF_VALUE: 26
PIF_VALUE: 25
PIF_VALUE: 25
PIF_VALUE: 4
PIF_VALUE: 3
PIF_VALUE: 24
PIF_VALUE: 14
PIF_VALUE: 26
PIF_VALUE: 16
PIF_VALUE: 25
PIF_VALUE: 25
PIF_VALUE: 24
PIF_VALUE: 27
PIF_VALUE: 26
PIF_VALUE: 26
PIF_VALUE: 27
PIF_VALUE: 21
PIF_VALUE: 25
PIF_VALUE: 26
PIF_VALUE: 25
PIF_VALUE: 22
PIF_VALUE: 26
PIF_VALUE: 25
PIF_VALUE: 25
PIF_VALUE: 27
PIF_VALUE: 26
PIF_VALUE: 26
PIF_VALUE: 24

## 2022-04-12 ASSESSMENT — PAIN SCALES - GENERAL
PAINLEVEL_OUTOF10: 0
PAINLEVEL_OUTOF10: 2
PAINLEVEL_OUTOF10: 0
PAINLEVEL_OUTOF10: 8

## 2022-04-12 ASSESSMENT — PAIN DESCRIPTION - ORIENTATION: ORIENTATION: LEFT

## 2022-04-12 ASSESSMENT — PAIN DESCRIPTION - DESCRIPTORS: DESCRIPTORS: OTHER (COMMENT)

## 2022-04-12 ASSESSMENT — PAIN - FUNCTIONAL ASSESSMENT: PAIN_FUNCTIONAL_ASSESSMENT: 0-10

## 2022-04-12 ASSESSMENT — PAIN DESCRIPTION - PAIN TYPE: TYPE: SURGICAL PAIN

## 2022-04-12 ASSESSMENT — PAIN DESCRIPTION - PROGRESSION: CLINICAL_PROGRESSION: GRADUALLY IMPROVING

## 2022-04-12 ASSESSMENT — PAIN DESCRIPTION - LOCATION: LOCATION: HIP

## 2022-04-12 NOTE — H&P
PLANNED PROCEDURE:  Left total hip arthroplasty    I have reviewed the preoperative clearance and History & Physical performed on 4/6/22 and scanned into Epic, reviewed my notes and patient's imaging, examined the patient, and no pertinent changes are required. Past Medical History:   Diagnosis Date    Anesthesia     high blood pressure after surgery    Cancer ([address]     COLON    DVT (deep venous thrombosis) (HCC)     GERD without esophagitis 5/2/2019    Hypertension     Hypertension, essential 5/2/2019    Obstructive sleep apnea (adult) (pediatric)        Past Surgical History:   Procedure Laterality Date    CATARACT REMOVAL WITH IMPLANT  08/14/2012    phaco left eye/ IOL    CATARACT REMOVAL WITH IMPLANT  8/28/2012    Right     COLONOSCOPY      HERNIA REPAIR       TERI    SHOULDER SURGERY      LEFT    SHOULDER SURGERY  12-26-13    BILATERAL LEVATOR ADVANCEMENT, EXCISION OF MULTIPLE NEVIS ON    SMALL INTESTINE SURGERY      UPPER GASTROINTESTINAL ENDOSCOPY  04/19/2017    UPPER GASTROINTESTINAL ENDOSCOPY  06/06/2018    EUS, gastric nodule biopsy       Social History     Tobacco Use    Smoking status: Never Smoker    Smokeless tobacco: Never Used   Vaping Use    Vaping Use: Never used   Substance Use Topics    Alcohol use: No    Drug use: No       No Known Allergies    Medications Prior to Admission: apixaban (ELIQUIS) 5 MG TABS tablet, Take 5 mg by mouth 2 times daily Indications: Deep Vein Thrombosis of Lower Extremity  tamsulosin (FLOMAX) 0.4 MG capsule, Take 0.4 mg by mouth daily  spironolactone (ALDACTONE) 25 MG tablet, Take 0.5 tablets by mouth daily  carvedilol (COREG) 12.5 MG tablet, Take 1 tablet by mouth 2 times daily (with meals)  ferrous sulfate 325 (65 Fe) MG tablet, Take 325 mg by mouth  Cholecalciferol (VITAMIN D) 2000 units CAPS capsule, Take by mouth daily  amLODIPine (NORVASC) 10 MG tablet, Take 10 mg by mouth daily.         Current Facility-Administered Medications:    lactated ringers infusion, , IntraVENous, Continuous, King High MD    ceFAZolin (ANCEF) 2000 mg in dextrose 5 % 50 mL IVPB, 2,000 mg, IntraVENous, On Call to OR, King High MD    lidocaine PF 1 % injection 0.5 mL, 0.5 mL, IntraDERmal, Once, King High MD    ortho mix injection, , Injection, On Call, King High MD    tranexamic acid (CYKLOKAPRON) 1,000 mg in sodium chloride 0.9 % 50 mL IVPB, 1,000 mg, IntraVENous, Once, King High MD    tranexamic acid (CYKLOKAPRON) 1,000 mg in sodium chloride 0.9 % 50 mL IVPB, 1,000 mg, IntraVENous, On Call to OR, King High MD    lidocaine PF 1 % injection 1 mL, 1 mL, IntraDERmal, Once PRN, Sherin Schultz MD    lactated ringers infusion, , IntraVENous, Continuous, Sherin Schultz MD    meperidine (DEMEROL) injection 12.5 mg, 12.5 mg, IntraVENous, Q5 Min PRN, Sherin Schultz MD    HYDROmorphone (DILAUDID) injection 0.5 mg, 0.5 mg, IntraVENous, Q5 Min PRN, Sherin Schultz MD    oxyCODONE (ROXICODONE) immediate release tablet 5 mg, 5 mg, Oral, Once PRN, Sherin Schultz MD    ondansetron [facility]) injection 4 mg, 4 mg, IntraVENous, Once PRN, Sherin Schultz MD    labetalol (NORMODYNE;TRANDATE) injection 10 mg, 10 mg, IntraVENous, Q15 Min PRN **OR** hydrALAZINE (APRESOLINE) injection 10 mg, 10 mg, IntraVENous, Q15 Min PRN, Sherin Schultz MD    Vitals:    03/18/22 1545   Weight: 210 lb (95.3 kg)   Height: 5' 9\" (1.753 m)       Body mass index is 31.01 kg/m². Left hip skin clear  SILT SP/DP/T/LFC nerve distributions; EHL/FHL/TA/GS intact  PT pulse intact, RRR    An opportunity for questions was again given, and all questions were answered. The patient would like to proceed.     King High MD  4/12/2022

## 2022-04-12 NOTE — PROGRESS NOTES
Ortho POC    Seen in recovery  About to work with PT/OT  No N/T  No CP or SOB    Vitals:    04/12/22 1300 04/12/22 1315 04/12/22 1330 04/12/22 1345   BP: 136/82 135/76 137/74 (!) 140/74   Pulse: 62 60 62 63   Resp: 16 18 18 11   Temp:       TempSrc:       SpO2: 98% 97% 94% 92%   Weight:       Height:         NAD, pleasant  Wife bedside  LLE - dressing intact   SILT SP/DP/T/LFC nerve distributions; EHL/FHL/TA/GS intact   WWP      POD #0 LEFT SERENITY  Left leg full weight bearing as tolerated. Use a walker for ambulation until the therapist advances you. Do not get up without assistance. Keep your surgical dressing clean/dry/intact, but replace as needed for soiling or saturation. Surgical site should be covered for 10 days after surgery, after which it can be left open to air if there is no drainage. Leave the steristrips in place if present. Use a leak proof ice pack, and ice the surgical site frequently to aid in pain (alternate 15 minutes on, 15 minutes off); make sure there is a barrier between the ice pack and your skin, such as a clean dry towel. Wean off narcotics as soon as possible. DVT prophylaxis - restart Eliquis POD #1 evening (4/13/22 9pm)  Use compression stockings during the day. Perform quad sets, ankle pumps throughout the day. Perform incentive spirometry every hour while awake, including at home.     Melvin Ham MD

## 2022-04-12 NOTE — PROGRESS NOTES
CLINICAL PHARMACY NOTE: MEDS TO BEDS    Total # of Prescriptions Filled: 2   The following medications were delivered to the patient:  · Oxycodone/APAP 5/325mg  · Cephalexin 500mg    Additional Documentation:    Medications were delivered to patient's room and patient's wife signed for    Yaneli Ragsdale

## 2022-04-12 NOTE — PROGRESS NOTES
Hand off report received from Wesley Queen and Wilburn Severin RN at bedside. Pt awake and alert, dressing to left hip cdi, pedal pulse palpable to left foot. Family called to bedside. Priscilla orthopedic navigator at bedside. Will monitor.

## 2022-04-12 NOTE — OP NOTE
[facility]    PATIENT NAME:  Arianna Alanis    PATIENT :  1950    DATE OF PROCEDURE:  22      PREOPERATIVE DIAGNOSIS:  Left hip arthritis. POSTOPERATIVE DIAGNOSIS:  same     OPERATION PERFORMED:  Left total hip arthroplasty, direct anterior. ANESTHESIA:  General endotracheal.     SURGEON:  Sandy Mac MD     ASSISTANT:  Duy Guerrero and Vinny Nice. BLOOD LOSS:  400 mL. COMPLICATIONS:  None. IMPLANTS:  Depuy total hip arthroplasty system. 1.  52 mm Telford Gription acetabular shell, 0 bone screws. 2.  36 mm neutral liner, +4 mm.  3.  Size 5 Actis stem, standard collar. 4.  36 mm ceramic femoral head, +8.5 mm. OPERATIVE PROCEDURE:  The patient was brought back to the OR and general anesthesia was administered by way of endotracheal tube. Traction boots were placed to both feet after webril and Coband were applied. The patient was then moved onto the HANA table. A well-padded perineal post was used, and the operative side upper extremity was brought across the chest and secured. All pressure points were well-padded. The operative hip was prepped and draped in the usual sterile fashion, extending up the flank and down the thigh. A full time-out was now performed with all parties in agreement. The patient did receive ancef 2 gm for antibiotic prophylaxis. The patient was also dosed with IV tranexamic acid 1 gram.    Standard anterior hip incision directly over the tensor fascia abraham muscle was made and carried down onto the fascial layer. The fascia was split in line with the skin incision and the tensor fascia abraham muscle fibers were then teased off the fascia and retracted posteriorly. It was mobilized proximally and distally. The deep fascia was incised in line with the rectus femoris and this was retracted medially. The ascending branches of the lateral circumflex vessel were identified and coagulated with Bovie and Aquamantys.  Extracapsular retractors were placed. An anterior capsulectomy was performed. Retractors were replaced intracapsularly and the femoral neck osteotomy was then performed. The femoral head was removed with the power corkscrew. The pubofemoral ligament was released from the medial calcar to aid in exposure. Circumferential retractors were placed around the acetabulum and a circumferential labrectomy was performed. Soft tissue was removed from the cotyloid fossa. We started with medialization and then sequentially reamed up. Circumferential bleeding bone was achieved with the 51 mm reamer. Final reaming was performed with fluoroscopic guidance to ensure full seating and appropriate inclination and anteversion. Once this was achieved, the wound was irrigated and the [facility] again using fluoroscopic guidance. There was great press fit fixation achieved, therefore, no bone screws were required. The formal neutral acetabular liner was then opened and malleted into place. The tabs were confirmed to be engaged without soft tissue interposition. Next, the proximal femur was exposed. The pubofemoral ligament was previously released. The superior capsule over the saddle region was released as well. With the foot locked in external rotation, the leg was brought to hyperextension and adduction. The box osteotome was used first followed by sequential broaching. With the size 5 broach, there was good fit without evidence of toggling. Trial reduction was performed and intraoperative fluoroscopy was brought in to assess stem size, leg lengths, and offset. Using this information, adjustments were made as necessary to the broach size and seating, as well as neck length after the hip was re-dislocated. The calcar planer was used as necessary to smoothen the neck osteotomy. The wound was irrigated out and the posterior capsule was injected with the periarticular injection.   The formal stem was then implanted and malleted to the appropriate depth. Next, the trunnion was cleaned and dried, and the formal femoral head was malleted into place. The taper was confirmed to be engaged. At this point, the hip was re-reduced and with the foot in neutral position, did demonstrate appropriate head coverage anteriorly. There was no evidence of instability with the foot externally rotated past 100 degrees. The wound was thoroughly irrigated at this time. Chlorhexidine irrigation was used liberally throughout the case. The second dose of tranexamic acid was given IV. Hemostasis was confirmed. The rest of the periarticular injection was infiltrated over the surrounding musculature and subcutaneous tissue. Topical vancomycin powder was applied. The hip was then closed in a layered fashion. The gluteus medius, rectus femoris, and vastus lateralis were reapproximated to close the dead space. The TFL fascia was closed in a watertight fashion using Vicryls and Stratafix. The skin was closed with inverted Vicryls and a 3-0 Monocryl running subcuticular stitch. The incision was cleaned with wet and dries and then dressed with Steri-Strips and a water occlusive dressing. The drapes were removed. The patient was moved onto the hospital bed, traction boots were removed, and the patient was extubated before being transferred to the PACU.        Ana M Gilbert MD

## 2022-04-12 NOTE — PROGRESS NOTES
Pt to PACU from OR. Arouses to voice, VSS. Oral airway in place. x1 incision to L anterior hip with dressing CDI. L pedal pulse palpable, extremity warm.  Will continue to monitor

## 2022-04-12 NOTE — ANESTHESIA POSTPROCEDURE EVALUATION
Department of Anesthesiology  Postprocedure Note    Patient: Vijaya Lewis  MRN: [id number]  YOB: 1950  Date of evaluation: 4/12/2022  Time:  12:35 PM     Procedure Summary     Date: 04/12/22 Room / Location: [address]    Anesthesia Start: 0957 Anesthesia Stop: 6908    Procedure: LEFT TOTAL HIP REPLACEMENT DIRECT ANTERIOR - DEPUY ADVANCED (Left Hip) Diagnosis: (M16.12  LEFT HIP ARTHRITIS)    Surgeons: Julio Perez MD Responsible Provider: Suly Stewart MD    Anesthesia Type: general ASA Status: 3          Anesthesia Type: general    Nadeen Phase I: Nadeen Score: 7    Nadeen Phase II:      Last vitals: Reviewed and per EMR flowsheets.        Anesthesia Post Evaluation    Patient location during evaluation: PACU  Patient participation: complete - patient participated  Level of consciousness: awake and alert  Airway patency: patent  Nausea & Vomiting: no vomiting and no nausea  Complications: no  Cardiovascular status: hemodynamically stable  Respiratory status: acceptable  Hydration status: stable

## 2022-04-12 NOTE — PROGRESS NOTES
Met with patient at bedside, patient is alert and oriented. Patient in stretcher. Wife at bedside. We discussed role of nurse navigator and gave contact information.  Reviewed reasons to call with questions or concerns, importance of TEDS, incentive spirometer, pain medication, and physical and occupational therapy. 2/4 bed rails up, bed in lowest position, fall precautions in place, call light within reach.      Plan: Discharge today with op pt. Orders placed for [facility]. wife will transport patient.   DME needs:  needs rolling yvette Ag  Orthopedic Nurse Navigator  Phone number: [phone]    Follow up appointments:    Future Appointments  4/25/2022  10:40 AM   Shamar Rios MD         [facility]        [facility]  4/27/2022  10:30 AM   Adrianna Esteban MD            Ortho            [facility]  7/28/2022  12:30 PM   Sarita George MD        [facility]  9/13/2022  11:30 AM   Gwyn Macdonald MD        [facility]

## 2022-04-12 NOTE — PROGRESS NOTES
Patient alert and oriented. Reviewed discharge, follow up, and medication instructions with patient and family member. Patient states understanding. Educated patient  to wear magan hose for 2 weeks and to remove at night and use incentive spirometer and take all medication as directed. Patient given instructions that due to state law, pain medication was written for 7 days and cannot be filled early. Instructed patient to take small dose of narcotic as possible and call the office if refill is needed after 7 days. Prescription handed to patient and/or family.       Chandler Gonzalez RN

## 2022-04-12 NOTE — PROGRESS NOTES
Physical Therapy    Facility/Department: [facility] OR  Initial Assessment    NAME: Gurjit Barr  : 1950  MRN: [id number]    Date of Service: 2022    Discharge Recommendations:  Gurjit Barr scored a 18/24 on the AM-PAC short mobility form. Current research shows that an AM-PAC score of 18 or greater is typically associated with a discharge to the patient's home setting. Based on the patient's AM-PAC score and their current functional mobility deficits, it is recommended that the patient have 2-3 sessions per week of Physical Therapy at d/c to increase the patient's independence. At this time, this patient demonstrates the endurance and safety to discharge home with  OP services and a follow up treatment frequency of 2-3x/wk. Please see assessment section for further patient specific details. If patient discharges prior to next session this note will serve as a discharge summary. Please see below for the latest assessment towards goals. Outpatient PT   PT Equipment Recommendations  Equipment Needed: Yes  Mobility Devices: [place] January: Rolling  Other: recommend RW for safe ambulation - already delivered to room    Assessment   Body structures, Functions, Activity limitations: Decreased functional mobility ; Decreased balance;Decreased ROM; Decreased endurance;Decreased strength  Assessment: Patient not at baseline function and would benefit from skilled PT to address above deficits and facilitate return to baseline function. Patient with pending d/c home with spouse this date.  Recommend out-patient PT to address above deficits  Treatment Diagnosis: decreased functional mobility, impaired gait, decreased balance  Prognosis: Good  Decision Making: Medium Complexity  Clinical Presentation: evolving  PT Education: Goals;PT Role;Plan of Care;Gait Training;Weight-bearing Education  Patient Education: safety on steps, d/c recommendations - verbalized understanding  Barriers to Learning: none  REQUIRES PT FOLLOW UP: Yes  Activity Tolerance  Activity Tolerance: Patient Tolerated treatment well       Patient Diagnosis(es): The primary encounter diagnosis was Preop testing. A diagnosis of Arthritis of left hip was also pertinent to this visit. has a past medical history of Anesthesia, Cancer ([facility] [address] DVT (deep venous thrombosis) ([facility] [address] GERD without esophagitis, Hypertension, Hypertension, essential, and Obstructive sleep apnea (adult) (pediatric). has a past surgical history that includes hernia repair; Small intestine surgery; shoulder surgery; Colonoscopy; Cataract removal with implant (08/14/2012); Cataract removal with implant (8/28/2012); shoulder surgery (12-26-13); Upper gastrointestinal endoscopy (04/19/2017); and Upper gastrointestinal endoscopy (06/06/2018). Restrictions  Restrictions/Precautions  Restrictions/Precautions: Weight Bearing  Required Braces or Orthoses?: No  Lower Extremity Weight Bearing Restrictions  Left Lower Extremity Weight Bearing: Weight Bearing As Tolerated  Position Activity Restriction  Hip Precautions:  (no combined hip extension with external rotation)  Other position/activity restrictions: L SERENITY  Vision/Hearing  Vision: Impaired  Vision Exceptions: Wears glasses for reading  Hearing: Within functional limits     Subjective  General  Chart Reviewed: Yes  Patient assessed for rehabilitation services?: Yes  Family / Caregiver Present: Yes (spouse)  Diagnosis: L SERENITY  Follows Commands: Within Functional Limits  General Comment  Comments: supine in stretcher upon arrival (seen in PACU)  Subjective  Subjective: Reported 8/10 L hip pain at rest. Reported pain decreased to 2-3/10 following pain medication and mobility. Agreeable to therapy.   Pain Screening  Patient Currently in Pain: Denies          Orientation  Orientation  Overall Orientation Status: Within Functional Limits  Social/Functional History  Social/Functional History  Lives With: Spouse  Type of Home: House  Home Layout: One level  Home Access: Stairs to enter without rails  Entrance Stairs - Number of Steps: 3 BASHIR  Bathroom Shower/Tub: Shower chair without back,Walk-in shower  Bathroom Toilet: Handicap height  Bathroom Equipment: Toilet raiser (grab bars on toilet seat)  Bathroom Accessibility: Accessible  Home Equipment: Rolling walker  ADL Assistance: Independent  Homemaking Assistance: Independent  Homemaking Responsibilities: Yes  Ambulation Assistance: Independent  Transfer Assistance: Independent  Active : Yes  Mode of Transportation: Truck,SUV  Occupation: Retired  Leisure & Hobbies: Yard work-cutting grass, fertilizing, working on cars  Additional Comments: sleep sin flat bed. denies recent falls    Objective          AROM RLE (degrees)  RLE AROM: WFL  AROM LLE (degrees)  LLE AROM : WFL (following hip precautions)  Strength RLE  Strength RLE: WFL  Strength LLE  Strength LLE:  (not formally assessed due to surgery)        Bed mobility  Supine to Sit: Supervision  Scooting: Supervision  Transfers  Sit to Stand: Supervision (cues for hand placement)  Stand to sit: Supervision  Car Transfer: Supervision (transport chair transfer as patient in PACU)  Ambulation  Ambulation?: Yes  Ambulation 1  Surface: level tile  Device: Rolling Walker  Assistance: Contact guard assistance;Supervision (CGA progressing to supervision)  Quality of Gait: step to gait pattern with decreased WBing on LLE, increased time between steps progressing to steady step through gait pattern  Distance: 200'  Stairs/Curb  Stairs?: Yes  Stairs  # Steps : 12  Assistance: Contact guard assistance;Stand by assistance  Comment: Performed 4 steps with B rails, 4 steps with unilateral rail and 4 steps with HHA to mimic entrance into home environment.  Non-reciprocal pattern used throughout     Balance  Sitting - Static: Good  Sitting - Dynamic: Good  Standing - Static: Good  Standing - Dynamic: [address]  Times per week: pending d/c  Safety Devices  Type of devices:  (left in transport chair with nursing present)  Restraints  Initially in place: No      AM-PAC Score  AM-PAC Inpatient Mobility Raw Score : 18 (04/12/22 1541)  AM-PAC Inpatient T-Scale Score : 43.63 (04/12/22 1541)  Mobility Inpatient CMS 0-100% Score: 46.58 (04/12/22 1541)  Mobility Inpatient CMS G-Code Modifier : CK (04/12/22 1541)          Goals  Short term goals  Time Frame for Short term goals: No acute PT goals due to pending discharge  Patient Goals   Patient goals : to get back to yard work       Therapy Time   Individual Concurrent Group Co-treatment   Time In       [facility] 25   Minutes       43   Timed Code Treatment Minutes: 15 Minutes     Units billed shared with OT due to outpatient surgery status.      Hadley Madden, PT    Thanks, Hadley Madden, PT, DPT [id number]

## 2022-04-12 NOTE — ANESTHESIA PRE PROCEDURE
Department of Anesthesiology  Preprocedure Note       Name:  Karuna Miles   Age:  73 y.o.  :  1950                                          MRN:  [id number]         Date:  2022      Surgeon: Elyssa Riddle):  Masha Orosco MD    Procedure: Procedure(s):  LEFT TOTAL HIP REPLACEMENT DIRECT ANTERIOR - DEPUY ADVANCED    Medications prior to admission:   Prior to Admission medications    Medication Sig Start Date End Date Taking? Authorizing Provider   apixaban (ELIQUIS) 5 MG TABS tablet Take 5 mg by mouth 2 times daily Indications: Deep Vein Thrombosis of Lower Extremity   Yes Historical Provider, MD   tamsulosin (FLOMAX) 0.4 MG capsule Take 0.4 mg by mouth daily   Yes Historical Provider, MD   spironolactone (ALDACTONE) 25 MG tablet Take 0.5 tablets by mouth daily 3/24/22   Jalen Johnson MD   carvedilol (COREG) 12.5 MG tablet Take 1 tablet by mouth 2 times daily (with meals) 2/10/22   Corinne Grippe, APRN - CNP   ferrous sulfate 325 (65 Fe) MG tablet Take 325 mg by mouth    Historical Provider, MD   Cholecalciferol (VITAMIN D) 2000 units CAPS capsule Take by mouth daily    Historical Provider, MD   amLODIPine (NORVASC) 10 MG tablet Take 10 mg by mouth daily.       Historical Provider, MD       Current medications:    Current Facility-Administered Medications   Medication Dose Route Frequency Provider Last Rate Last Admin    lactated ringers infusion   IntraVENous Continuous Masha Orosco MD        ceFAZolin (ANCEF) 2000 mg in dextrose 5 % 50 mL IVPB  2,000 mg IntraVENous On Call to [facility] Venecia Merrill MD        lidocaine PF 1 % injection 0.5 mL  0.5 mL IntraDERmal Once Masha Orosco MD        ortho mix injection   Injection On Call Masha Orosco MD        tranexamic acid (CYKLOKAPRON) 1,000 mg in sodium chloride 0.9 % 50 mL IVPB  1,000 mg IntraVENous Once Masha Orosco MD        tranexamic acid (CYKLOKAPRON) 1,000 mg in sodium chloride 0.9 % 50 mL IVPB  1,000 mg IntraVENous On Call to [facility] Venecia Merrill, MD        lidocaine PF 1 % injection 1 mL  1 mL IntraDERmal Once PRN Aldo Stinson MD        lactated ringers infusion   IntraVENous Continuous Aldo Stinson MD        meperidine (DEMEROL) injection 12.5 mg  12.5 mg IntraVENous Q5 Min PRN Aldo Stinson MD        HYDROmorphone (DILAUDID) injection 0.5 mg  0.5 mg IntraVENous Q5 Min PRN Aldo Stinson MD        oxyCODONE (ROXICODONE) immediate release tablet 5 mg  5 mg Oral Once PRN Aldo Stinson MD        ondansetron [facility]) injection 4 mg  4 mg IntraVENous Once PRN Aldo Stinson MD        labetalol (NORMODYNE;TRANDATE) injection 10 mg  10 mg IntraVENous Q15 Min PRN Aldo Stinson MD        Or    hydrALAZINE (APRESOLINE) injection 10 mg  10 mg IntraVENous Q15 Min PRN Aldo Stinson MD           Allergies:  No Known Allergies    Problem List:    Patient Active Problem List   Diagnosis Code    Abnormal EKG R94.31    Other hyperlipidemia E78.49    Bradycardia R00.1    GERD without esophagitis K21.9    Hypertension, essential I10    Obstructive sleep apnea (adult) (pediatric) G47.33    Syncope R55    Class 1 obesity without serious comorbidity with body mass index (BMI) of 30.0 to 30.9 in adult E66.9, Z68.30    Primary osteoarthritis of left hip M16.12       Past Medical History:        Diagnosis Date    Anesthesia     high blood pressure after surgery    Cancer ([facility] [address]     COLON    DVT (deep venous thrombosis) ([facility] [address]     GERD without esophagitis 5/2/2019    Hypertension     Hypertension, essential 5/2/2019    Obstructive sleep apnea (adult) (pediatric)        Past Surgical History:        Procedure Laterality Date    CATARACT REMOVAL WITH IMPLANT  08/14/2012    phaco left eye/ IOL    CATARACT REMOVAL WITH IMPLANT  8/28/2012    Right     COLONOSCOPY      HERNIA REPAIR       TERI    SHOULDER SURGERY      LEFT    SHOULDER SURGERY  12-26-13    BILATERAL LEVATOR ADVANCEMENT, EXCISION OF MULTIPLE NEVIS ON    SMALL INTESTINE SURGERY      UPPER GASTROINTESTINAL ENDOSCOPY  04/19/2017    UPPER GASTROINTESTINAL ENDOSCOPY  06/06/2018    EUS, gastric nodule biopsy       Social History:    Social History     Tobacco Use    Smoking status: Never Smoker    Smokeless tobacco: Never Used   Substance Use Topics    Alcohol use: No                                Counseling given: Not Answered      Vital Signs (Current):   Vitals:    03/18/22 1545   Weight: 210 lb (95.3 kg)   Height: 5' 9\" (1.753 m)                                              BP Readings from Last 3 Encounters:   03/24/22 132/88   03/11/22 132/64   02/10/22 (!) 140/76       NPO Status: Time of last liquid consumption: 2100                        Time of last solid consumption: 2000                        Date of last liquid consumption: 04/11/22                        Date of last solid food consumption: 04/11/22    BMI:   Wt Readings from Last 3 Encounters:   03/18/22 210 lb (95.3 kg)   03/24/22 218 lb 6.4 oz (99.1 kg)   03/11/22 215 lb (97.5 kg)     Body mass index is 31.01 kg/m². CBC:   Lab Results   Component Value Date    WBC 7.5 03/21/2022    RBC 4.56 03/21/2022    HGB 13.1 03/21/2022    HCT 39.0 03/21/2022    MCV 85.6 03/21/2022    RDW 13.5 03/21/2022     03/21/2022       CMP:   Lab Results   Component Value Date     03/21/2022    K 3.3 03/21/2022    K 3.6 04/26/2019     03/21/2022    CO2 25 03/21/2022    BUN 16 03/21/2022    CREATININE 1.4 03/21/2022    GFRAA >60 03/21/2022    AGRATIO 1.4 03/21/2022    LABGLOM 50 03/21/2022    GLUCOSE 95 03/21/2022    PROT 7.1 03/21/2022    CALCIUM 9.4 03/21/2022    BILITOT 0.5 03/21/2022    ALKPHOS 112 03/21/2022    AST 18 03/21/2022    ALT 14 03/21/2022       POC Tests: No results for input(s): POCGLU, POCNA, POCK, POCCL, POCBUN, POCHEMO, POCHCT in the last 72 hours.     Coags:   Lab Results   Component Value Date    PROTIME 17.1 03/21/2022    INR 1.49 03/21/2022    APTT 43.7

## 2022-04-12 NOTE — PROGRESS NOTES
Pt resting comfortable in bed- responds to voice. VSS. x1 incision to L anterior hip with dressing CDI. Pedal pulse in L foot palpable, extremity warm. Pt denies pain and nausea. Phase 1 criteria met, will transfer to phase 2.

## 2022-04-12 NOTE — PROGRESS NOTES
Occupational Therapy   Occupational Therapy Initial Assessment  Date: 2022   Patient Name: Lisette Warren  MRN: [id number]     : 1950    Date of Service: 2022    Discharge Recommendations:Mayi Alanis scored a 19/24 on the AM-PAC ADL Inpatient form. Current research shows that an AM-PAC score of 18 or greater is typically associated with a discharge to the patient's home setting. Based on the patient's AM-PAC score, and their current ADL deficits, it is recommended that the patient have 2-3 sessions per week of Occupational Therapy at d/c to increase the patient's independence. At this time, this patient demonstrates the endurance and safety to discharge home with outpatient OT (home vs OP services) and a follow up treatment frequency of 2-3x/wk. Please see assessment section for further patient specific details. If patient discharges prior to next session this note will serve as a discharge summary. Please see below for the latest assessment towards goals. OT Equipment Recommendations  Equipment Needed: No    Assessment   Performance deficits / Impairments: Decreased functional mobility ; Decreased endurance;Decreased ADL status; Decreased high-level IADLs;Decreased balance  Assessment: Pt presenting below his baseline with the above deficits associated with s/p L THR (direct lateral approach). Anticipate safe and successful return to PLOF. Continued OT indicated in order to maximize independence with all occupational persuits. Prognosis: Good  Decision Making: Low Complexity  Exam: as above  Assistance / Modification: Supervision with RW  OT Education: OT Role;Plan of Care;Transfer Training;ADL Adaptive Strategies  Patient Education: DC recommendations--home vs. outpatient. Pt verbalized understanding. REQUIRES OT FOLLOW UP: Yes  Activity Tolerance  Activity Tolerance: Patient Tolerated treatment well  Safety Devices  Safety Devices in place: Yes  Type of devices:  All fall risk precautions in place;Nurse notified; Left in chair (Pt left in transport chair with SW and spouse in PACU. He is dressed and ready for dc.)  Restraints  Initially in place: No           Patient Diagnosis(es): The primary encounter diagnosis was Preop testing. A diagnosis of Arthritis of left hip was also pertinent to this visit. has a past medical history of Anesthesia, Cancer ([facility] [address] DVT (deep venous thrombosis) ([facility] [address] GERD without esophagitis, Hypertension, Hypertension, essential, and Obstructive sleep apnea (adult) (pediatric). has a past surgical history that includes hernia repair; Small intestine surgery; shoulder surgery; Colonoscopy; Cataract removal with implant (08/14/2012); Cataract removal with implant (8/28/2012); shoulder surgery (12-26-13); Upper gastrointestinal endoscopy (04/19/2017); and Upper gastrointestinal endoscopy (06/06/2018). Restrictions  Restrictions/Precautions  Restrictions/Precautions: Weight Bearing  Required Braces or Orthoses?: No  Lower Extremity Weight Bearing Restrictions  Left Lower Extremity Weight Bearing: Weight Bearing As Tolerated  Position Activity Restriction  Hip Precautions:  (no combined hip extension with external rotation)  Other position/activity restrictions: L SERENITY    Subjective   General  Chart Reviewed: Yes  Patient assessed for rehabilitation services?: Yes  Family / Caregiver Present: Yes (spouse)  Diagnosis: s/p L total hip recplacement--direct lateral approach  Subjective  Subjective: Pt supine in bed upon arrival; reporting 8/10 but just recieved meds. Spouse present and able to assist with social functional history. Pt otherwise agreeable to PT/OT evaluations. Pain Assessment  Pain Assessment: 0-10  Pain Level: 2  Pain Type: Surgical pain  Pain Location: Hip  Pain Orientation: Left  Pain Descriptors:  Other (Comment) (\"pinching\")  Clinical Progression: Gradually improving  Vital Signs  Temp: 97.1 °F (36.2 °C)  Pulse: 66  Heart Rate Source: Monitor  Resp: 16  BP: (!) 141/82  Patient Position: Up in chair  Oxygen Therapy  SpO2: 97 %  Pulse Oximeter Device Mode: Intermittent  Pulse Oximeter Device Location: Finger  O2 Device: None (Room air)  Social/Functional History  Social/Functional History  Lives With: Spouse  Type of Home: House  Home Layout: One level  Home Access: Stairs to enter without rails  Entrance Stairs - Number of Steps: 3 BASHIR  Bathroom Shower/Tub: Shower chair without back,Walk-in shower  Bathroom Toilet: Handicap height  Bathroom Equipment: Toilet raiser (grab bars on toilet seat)  Bathroom Accessibility: Accessible  Home Equipment: Rolling walker  ADL Assistance: Independent  Homemaking Assistance: Independent  Homemaking Responsibilities: Yes  Ambulation Assistance: Independent  Transfer Assistance: Independent  Active : Yes  Mode of Transportation: Truck,SUV  Occupation: Retired  Leisure & Hobbies: Yard work-cutting grass, fertilizing, working on cars  Additional Comments: sleep sin flat bed. denies recent falls       Objective   Vision: Impaired  Vision Exceptions: Wears glasses for reading  Hearing: Within functional limits    Orientation  Overall Orientation Status: Within Functional Limits  Observation/Palpation  Posture: Good  Balance  Sitting Balance: Stand by assistance  Standing Balance: Contact guard assistance (CGA-progressing to Supervision)  Standing Balance  Time: ~5-6 min  Activity: ambulation, transfers, ADL completion, Steps negotiation  Comment: RW for BUE support. Functional Mobility  Functional - Mobility Device: Rolling Walker  Activity: Other  Assist Level: Supervision  Functional Mobility Comments: Pt demo ambulating in PACU hallway with SBA progressing to Supervision with RW. No LOB or SOB but indicating fatigue and pain at 3-4/10 after prolonged time in weight bearing. Please refer to PT notes for further details regarding gait quality and distances ambulated.   Toilet Transfers  Toilet Transfer: Unable Time In       1414   Time Out       1457   Minutes       43   Timed Code Treatment Minutes:  15 Minutes    Total Treatment Minutes: 43 minutes--time split with PT due to pt on observation status      Sandro Lundberg, OMEGA Lundberg OTR/L  [id number]

## 2022-04-13 ENCOUNTER — TELEPHONE (OUTPATIENT)
Dept: INPATIENT UNIT | Age: 72
End: 2022-04-13

## 2022-04-13 NOTE — TELEPHONE ENCOUNTER
Attempted to contact patient. Left voicemail for patient stating purpose and call back number.    Fab Sanders  Orthopedic Nurse Navigator  Phone number: [phone]  Future Appointments   Date Time Provider Amor Gabriela   4/25/2022 10:40 AM Aydin Scales MD  SLEEP MED [facility]   4/27/2022 10:30 AM Cricket Rouse MD  Ortho [facility]   7/28/2022 12:30 PM Korey Funk MD [facility]   9/13/2022 11:30 AM Hussein Escobedo MD [facility]

## 2022-04-14 NOTE — TELEPHONE ENCOUNTER
Attempted to contact patient. Left voicemail for patient stating purpose and call back number.    Martin Eldridge  Orthopedic Nurse Navigator  Phone number: [phone]  Future Appointments   Date Time Provider Amor Ochoa   4/25/2022 10:40 AM Shamar Rios MD  SLEEP MED [facility]   4/27/2022 10:30 AM Adrianna Esteban MD  Ortho [facility]   7/28/2022 12:30 PM Sarita George MD [facility]   9/13/2022 11:30 AM Gwyn Macdonald MD [facility]

## 2022-04-19 ENCOUNTER — HOSPITAL ENCOUNTER (OUTPATIENT)
Dept: PHYSICAL THERAPY | Age: 72
Setting detail: THERAPIES SERIES
Discharge: HOME | End: 2022-04-19
Payer: MEDICARE

## 2022-04-19 PROCEDURE — 97161 PT EVAL LOW COMPLEX 20 MIN: CPT | Performed by: PHYSICAL THERAPIST

## 2022-04-19 NOTE — PLAN OF CARE
[address] [address], [facility] [address]  Phone: [phone]   Fax: [phone]                                                       Physical Therapy Certification    Dear Referring Practitioner: Dr. Lavell Maurice,    We had the pleasure of evaluating the following patient for physical therapy services at [address] [place] [address]. A summary of our findings can be found in the initial assessment below. This includes our plan of care. If you have any questions or concerns regarding these findings, please do not hesitate to contact me at the office phone number checked above. Thank you for the referral.       Physician Signature:_______________________________Date:__________________  By signing above (or electronic signature), therapists plan is approved by physician      Patient: Kimmy Wall   : 1950   MRN: [id number]  Referring Physician: Referring Practitioner: Dr. Lavell Maurice      Evaluation Date: 2022      Medical Diagnosis Information:  Diagnosis: M16.12 (ICD-10-CM) - Arthritis of left hip   Treatment Diagnosis: L hip pain, weakness, decreased function s/p SERENITY                                         Insurance information: PT Insurance Information: [place] [employer] - AIM auth     Precautions/ Contra-indications: s/p L anterior SERENITY 22  Latex Allergy:  [x]NO      []YES  Preferred Language for Healthcare:   [x]English       []Other:    C-SSRS Triggered by Intake questionnaire (Past 2 wk assessment ):   [x] No, Questionnaire did not trigger screening.   [] Yes, Patient intake triggered C-SSRS Screening     [] Completed, no further action required. [] Completed, PCP notified via Epic    SUBJECTIVE: Patient stated complaint: L hip pain progressively worsening over the past couple of years; pt.  Notes that B knee pain was bothering him more than his hip so he saw the doctor about his knees but then they did an x-ray showing degeneration of hip; pt. Had L SERENITY 22 and is feeling significantly better; pt. Did not have home health therapy; pt. States that he is only taking the pain medication prn; pt.  Reports that he is able to sleep in the bed with minimal issue    Relevant Medical History: HTN, cardiac arrhythmia, OA  Functional Outcome: FOTO physical FS primary measure score = 47; Risk adjusted = 47    Pain Scale: 4/10  Easing factors: tylenol, pain medication, ice  Provocative factors: first standing after prolonged sitting    Type: []Constant   [x]Intermittent  [x]Radiating - occasionally in anterior leg [x]Localized []other:     Numbness/Tingling: denies    Occupation/School: retired    Living Status/Prior Level of Function:Prior to this injury / incident, pt was independent with ADLs and IADLs, walking with wife for exercise, golfing      OBJECTIVE:   Palpation: no point tenderness    Functional Mobility/Transfers: independent    Posture: mild guarding L hip, slight forward flexed trunk in standing    Bandages/Dressings/Incisions: pt. Denies drainage or redness at incision, negative for signs and symptoms of infection    Gait: (include devices/WB status) WBAT w/ B axillary crutches, mild step to gait    30s sit to stand: 9    TU.7s w/ B axillary crutches    Dermatomes Normal Abnormal Comments   inguinal area (L1)  x     anterior mid-thigh (L2) x     distal ant thigh/med knee (L3) x     medial lower leg and foot (L4) x     lateral lower leg and foot (L5) x     posterior calf (S1) x     medial calcaneus (S2) x            PROM AROM    L R L R   Hip Flexion ~90 ~110     Hip Abduction       Hip ER       Hip IR       Knee Flexion   [facility]   Knee Extension   [facility] WFL   Dorsiflexion        Plantarflexion        Inversion        Eversion            Strength (0-5) / Myotomes Left Right   Hip Flexion - supine     Hip Flexion - seated (L1-2)     Hip Abduction     Hip Adduction     Hip ER (M79.7)  []Vertigo  []Syncope  []Kidney Failure  []Cancer      []currently undergoing                treatment  []Pregnancy  []Incontinence   Prior surgeries  []involved limb  []previous spinal surgery  [] section birth  []hysterectomy  []bowel / bladder surgery  []other relevant surgeries   []Other:              Barriers to/and or personal factors that will affect rehab potential:              []Age  []Sex    []Smoker              [x]Motivation/Lack of Motivation                        [x]Co-Morbidities              []Cognitive Function, education/learning barriers              []Environmental, home barriers              []profession/work barriers  []past PT/medical experience  []other:  Justification:     Falls Risk Assessment (30 days):   [] Falls Risk assessed and no intervention required.   [x] Falls Risk assessed and Patient requires intervention due to being higher risk   TUG score (>12s at risk): 17.7    [x] Falls education provided, including continued use of AD        ASSESSMENT:   Functional Impairments:     []Noted lumbar/proximal hip/LE joint hypomobility   [x]Decreased LE functional ROM   [x]Decreased core/proximal hip strength and neuromuscular control   [x]Decreased LE functional strength   [x]Reduced balance/proprioceptive control   []other:      Functional Activity Limitations (from functional questionnaire and intake)   [x]Reduced ability to tolerate prolonged functional positions   [x]Reduced ability or difficulty with changes of positions or transfers between positions   [x]Reduced ability to maintain good posture and demonstrate good body mechanics with sitting, bending, and lifting   [x]Reduced ability to sleep   [x] Reduced ability or tolerance with driving and/or computer work   [x]Reduced ability to perform lifting, carrying tasks   [x]Reduced ability to squat   [x]Reduced ability to forward bend   [x]Reduced ability to ambulate prolonged functional periods/distances/surfaces   [x]Reduced ability to ascend/descend stairs   [x]Reduced ability to run, hop, cut or jump   []other:    Participation Restrictions   [x]Reduced participation in self care activities   [x]Reduced participation in home management activities   []Reduced participation in work activities   [x]Reduced participation in social activities. [x]Reduced participation in sport/recreation activities. Classification :    [x]Signs/symptoms consistent with post-surgical status including decreased ROM, strength and function.    []Signs/symptoms consistent with joint sprain/strain   []Signs/symptoms consistent with patella-femoral syndrome   []Signs/symptoms consistent with knee OA/hip OA   []Signs/symptoms consistent with internal derangement of knee/Hip   []Signs/symptoms consistent with functional hip weakness/NMR control      []Signs/symptoms consistent with tendinitis/tendinosis    []signs/symptoms consistent with pathology which may benefit from Dry needling      []other:      Prognosis/Rehab Potential:      []Excellent   [x]Good    []Fair   []Poor    Tolerance of evaluation/treatment:    []Excellent   [x]Good    []Fair   []Poor    Physical Therapy Evaluation Complexity Justification  [x] A history of present problem with:  [] no personal factors and/or comorbidities that impact the plan of care;  [x]1-2 personal factors and/or comorbidities that impact the plan of care  []3 personal factors and/or comorbidities that impact the plan of care  [x] An examination of body systems using standardized tests and measures addressing any of the following: body structures and functions (impairments), activity limitations, and/or participation restrictions;:  [] a total of 1-2 or more elements   [] a total of 3 or more elements   [x] a total of 4 or more elements   [x] A clinical presentation with:  [x] stable and/or uncomplicated characteristics   [] evolving clinical presentation with changing characteristics  [] unstable and unpredictable characteristics;   [x] Clinical decision making of [x] Low, [] moderate, [] high complexity using standardized patient assessment instrument and/or measurable assessment of functional outcome. [x] EVAL (LOW) 19419 (typically 15 minutes face-to-face)  [] EVAL (MOD) 67197 (typically 30 minutes face-to-face)  [] EVAL (HIGH) 16720 (typically 45 minutes face-to-face)  [] RE-EVAL     PLAN:   Frequency/Duration:  1-2 days per week for 8-10 Weeks:  Interventions:  [x]  Therapeutic exercise including: strength training, ROM, for Lower extremity and core   [x]  NMR activation and proprioception for LE, Glutes and Core   [x]  Manual therapy as indicated for LE, Hip and spine to include: Dry Needling/IASTM, STM, PROM, Gr I-IV mobilizations, manipulation. [x] Modalities as needed that may include: thermal agents, E-stim, Biofeedback, US, iontophoresis as indicated  [x] Patient education on joint protection, postural re-education, activity modification, progression of HEP. HEP instruction: Written HEP instructions provided and reviewed. GOALS:   Patient stated goal: recreational walking  [] Progressing: [] Met: [] Not Met: [] Adjusted    Therapist goals for Patient:   Short Term Goals: To be achieved in: 2 weeks  1. Independent in HEP and progression per patient tolerance, in order to prevent re-injury. [] Progressing: [] Met: [] Not Met: [] Adjusted  2. Patient will have a decrease in pain to facilitate improvement in movement, function, and ADLs as indicated by Functional Deficits. [] Progressing: [] Met: [] Not Met: [] Adjusted    Long Term Goals: To be achieved in: 8-10 weeks  1. FOTO score of at least 64 to assist with reaching prior level of function. [] Progressing: [] Met: [] Not Met: [] Adjusted  2. Patient will demonstrate increased AROM to 100+ L hip flexion without pain to allow for proper joint functioning as indicated by patients Functional Deficits. [] Progressing: [] Met: [] Not Met: [] Adjusted  3. Patient will demonstrate an increase in Strength to at least 4+/5 throughout as well as good proximal hip strength and control to allow for proper functional mobility as indicated by patients Functional Deficits. [] Progressing: [] Met: [] Not Met: [] Adjusted  4. Patient will return to functional activities including walking without AD and without increased symptoms or restriction. [] Progressing: [] Met: [] Not Met: [] Adjusted  5. Patient will demonstrate ability to perform reciprocal stairs and ramp walking in order to progress to walking outdoors with wife.    [] Progressing: [] Met: [] Not Met: [] Adjusted     Electronically signed by:  Barbie Barger, PT

## 2022-04-19 NOTE — FLOWSHEET NOTE
[address] [place] [address]  Phone: [phone]   Fax: [phone]    Physical Therapy Treatment Note/ Progress Report:     Date:  2022    Patient Name:  Lisette Warren    :  1950  MRN: [id number]  Restrictions/Precautions:    Medical/Treatment Diagnosis Information:  Diagnosis: M16.12 (ICD-10-CM) - Arthritis of left hip  Treatment Diagnosis: L hip pain, weakness, decreased function s/p SERENITY  Insurance/Certification information:  PT Insurance Information:  [employer]  Physician Information:  Referring Practitioner: Dr. Devan Dawson of care signed (Y/N): []  Yes [x]  No     Date of Patient follow up with Physician:      Progress Report: []  Yes  [x]  No     Date Range for reporting period:  Beginnin22  Ending:     Progress report due (10 Rx/or 30 days whichever is less): visit #10 or  (date)     Recertification due (POC duration/ or 90 days whichever is less): visit #24 or 22 (date)     Visit # Insurance Allowable Auth required?  Date Range   1 AIM [x]  Yes  []  No ???       Latex Allergy:  [x]NO      []YES  Preferred Language for Healthcare:   [x]English       []other:    Functional Scale:           Date assessed:  TO physical FS primary measure score = 47; risk adjusted = 47  22    Pain level:  4/10     SUBJECTIVE:  See eval    OBJECTIVE: See eval      RESTRICTIONS/PRECAUTIONS: L anterior SERENITY 22    Exercises/Interventions:     Therapeutic Exercise (05847)  Resistance / level Sets/sec Reps Notes / Cues   Reviewed HEP  5'  See below                                                                  Therapeutic Activities (45577)                                   Neuromuscular Re-ed (71762)                            Manual Intervention ([id number])       Knee mobs/PROM       Tib/Fem Mobs       Patella Mobs       Ankle mobs                         Modalities:     Pt. Education:  -pt educated on diagnosis, prognosis and expectations for rehab  -all pt questions were answered    Home Exercise Program:  Access Code: [facility]  URL: [email or web address]  Date: 04/19/2022  Prepared by: Fanny Reina    Exercises  Lying Prone with 1 Pillow - 2 x daily - 7 x weekly - 5m hold  Hooklying Gluteal Sets - 2 x daily - 7 x weekly - 10 reps - 5s hold  Supine Hip Adduction Isometric with Ball - 2 x daily - 7 x weekly - 10 reps - 5s hold  Seated Long Arc Quad - 2 x daily - 7 x weekly - 20 reps  Seated Hamstring Stretch - 2 x daily - 7 x weekly - 3 reps - 20s hold      Therapeutic Exercise and NMR EXR  [x] (96457) Provided verbal/tactile cueing for activities related to strengthening, flexibility, endurance, ROM for improvements in LE, proximal hip, and core control with self care, mobility, lifting, ambulation.  [] (07872) Provided verbal/tactile cueing for activities related to improving balance, coordination, kinesthetic sense, posture, motor skill, proprioception  to assist with LE, proximal hip, and core control in self care, mobility, lifting, ambulation and eccentric single leg control.   [] (09322) Therapist is in constant attendance of 2 or more patients providing skilled therapy interventions, but not providing any significant amount of measurable one-on-one time to either patient, for improvements in LE, proximal hip, and core control in self care, mobility, lifting, ambulation and eccentric single leg control.      NMR and Therapeutic Activities:    [] (23841 or 72715) Provided verbal/tactile cueing for activities related to improving balance, coordination, kinesthetic sense, posture, motor skill, proprioception and motor activation to allow for proper function of core, proximal hip and LE with self care and ADLs  [] (75391) Gait Re-education- Provided training and instruction to the patient for proper LE, core and proximal hip recruitment and positioning and eccentric body weight control with ambulation re-education including up and down stairs     Home Exercise Program:    [x] (50503) Reviewed/Progressed HEP activities related to strengthening, flexibility, endurance, ROM of core, proximal hip and LE for functional self-care, mobility, lifting and ambulation/stair navigation   [] (16431)Reviewed/Progressed HEP activities related to improving balance, coordination, kinesthetic sense, posture, motor skill, proprioception of core, proximal hip and LE for self care, mobility, lifting, and ambulation/stair navigation      Manual Treatments:  PROM / STM / Oscillations-Mobs:  G-I, II, III, IV (PA's, Inf., Post.)  [] (66114) Provided manual therapy to mobilize LE, proximal hip and/or LS spine soft tissue/joints for the purpose of modulating pain, promoting relaxation,  increasing ROM, reducing/eliminating soft tissue swelling/inflammation/restriction, improving soft tissue extensibility and allowing for proper ROM for normal function with self care, mobility, lifting and ambulation. Modalities:  [] (17476) Vasopneumatic compression: Utilized vasopneumatic compression to decrease edema / swelling for the purpose of improving mobility and quad tone / recruitment which will allow for increased overall function including but not limited to self-care, transfers, ambulation, and ascending / descending stairs.        Charges:  Timed Code Treatment Minutes: 5   Total Treatment Minutes: 28     [x] EVAL - LOW (92467)   [] EVAL - MOD (02147)  [] EVAL - HIGH (25228)  [] RE-EVAL (91663)  [] DO(52176) x       [] Ionto  [] NMR (93251) x       [] Vaso  [] Manual (53979) x       [] Ultrasound  [] TA x        [] Mech Traction (94573)  [] Aquatic Therapy x      [] ES (un) (37224):   [] Home Management Training x  [] ES(attended) (38961)   [] Dry Needling 1-2 muscles (50855):  [] Dry Needling 3+ muscles (814390  [] Group:      [] Other:     GOALS:   Patient stated goal: recreational walking  [] Progressing: [] Met: [] Not Met: [] Adjusted    Therapist goals for Patient:   Short Term Goals: To be achieved in: 2 weeks  1. Independent in HEP and progression per patient tolerance, in order to prevent re-injury. [] Progressing: [] Met: [] Not Met: [] Adjusted  2. Patient will have a decrease in pain to facilitate improvement in movement, function, and ADLs as indicated by Functional Deficits. [] Progressing: [] Met: [] Not Met: [] Adjusted    Long Term Goals: To be achieved in: 8-10 weeks  1. FOTO score of at least 64 to assist with reaching prior level of function. [] Progressing: [] Met: [] Not Met: [] Adjusted  2. Patient will demonstrate increased AROM to 100+ L hip flexion without pain to allow for proper joint functioning as indicated by patients Functional Deficits. [] Progressing: [] Met: [] Not Met: [] Adjusted  3. Patient will demonstrate an increase in Strength to at least 4+/5 throughout as well as good proximal hip strength and control to allow for proper functional mobility as indicated by patients Functional Deficits. [] Progressing: [] Met: [] Not Met: [] Adjusted  4. Patient will return to functional activities including walking without AD and without increased symptoms or restriction. [] Progressing: [] Met: [] Not Met: [] Adjusted  5. Patient will demonstrate ability to perform reciprocal stairs and ramp walking in order to progress to walking outdoors with wife. [] Progressing: [] Met: [] Not Met: [] Adjusted    Overall Progression Towards Functional goals/ Treatment Progress Update:  [] Patient is progressing as expected towards functional goals listed. [] Progression is slowed due to complexities/Impairments listed. [] Progression has been slowed due to co-morbidities.   [x] Plan just implemented, too soon to assess goals progression <30days   [] Goals require adjustment due to lack of progress  [] Patient is not progressing as expected and requires additional follow up with physician  [] Other    Persisting Functional Limitations/Impairments:  [x]Sitting [x]Standing   [x]Walking [x]Stairs   [x]Transfers [x]ADLs   [x]Squatting/bending [x]Kneeling  [x]Housework []Job related tasks  [x]Driving [x]Sports/Recreation   [x]Sleeping []Other:    ASSESSMENT:  See eval  Treatment/Activity Tolerance:  [x] Pt able to complete treatment [] Patient limited by fatique  [] Patient limited by pain  [] Patient limited by other medical complications  [] Other:     Prognosis:  [x] Good [] Fair  [] Poor    Patient Requires Follow-up: [x] Yes  [] No    Return to Play:    [x]  N/A   []  Stage 1: Intro to Strength   []  Stage 2: Return to Run and Strength   []  Stage 3: Return to Jump and Strength   []  Stage 4: Dynamic Strength and Agility   []  Stage 5: Sport Specific Training     []  Ready to Return to Play, Meets All Above Stages   []  Not Ready for Return to Sports   Comments:            PLAN: See eval. PT 1-2x / week for 8-10 weeks. [] Continue per plan of care [] Alter current plan (see comments)  [x] Plan of care initiated [] Hold pending MD visit [] Discharge    Electronically signed by: Bard Dumont, PT, DPT      Note: If patient does not return for scheduled/ recommended follow up visits, this note will serve as a discharge from care along with most recent update on progress.

## 2022-04-25 ENCOUNTER — TELEMEDICINE (OUTPATIENT)
Dept: PULMONOLOGY | Age: 72
End: 2022-04-25
Payer: MEDICARE

## 2022-04-25 DIAGNOSIS — G47.33 OBSTRUCTIVE SLEEP APNEA (ADULT) (PEDIATRIC): Chronic | ICD-10-CM

## 2022-04-25 DIAGNOSIS — K21.9 GERD WITHOUT ESOPHAGITIS: Chronic | ICD-10-CM

## 2022-04-25 DIAGNOSIS — I10 HYPERTENSION, ESSENTIAL: Chronic | ICD-10-CM

## 2022-04-25 DIAGNOSIS — E66.09 CLASS 1 OBESITY DUE TO EXCESS CALORIES WITH SERIOUS COMORBIDITY AND BODY MASS INDEX (BMI) OF 30.0 TO 30.9 IN ADULT: Chronic | ICD-10-CM

## 2022-04-25 PROCEDURE — 99214 OFFICE O/P EST MOD 30 MIN: CPT | Performed by: INTERNAL MEDICINE

## 2022-04-25 ASSESSMENT — SLEEP AND FATIGUE QUESTIONNAIRES
ESS TOTAL SCORE: 5
HOW LIKELY ARE YOU TO NOD OFF OR FALL ASLEEP WHILE LYING DOWN TO REST IN THE AFTERNOON WHEN CIRCUMSTANCES PERMIT: 2
HOW LIKELY ARE YOU TO NOD OFF OR FALL ASLEEP WHILE SITTING AND TALKING TO SOMEONE: 0
HOW LIKELY ARE YOU TO NOD OFF OR FALL ASLEEP WHILE SITTING QUIETLY AFTER LUNCH WITHOUT ALCOHOL: 1
HOW LIKELY ARE YOU TO NOD OFF OR FALL ASLEEP IN A CAR, WHILE STOPPED FOR A FEW MINUTES IN TRAFFIC: 0
HOW LIKELY ARE YOU TO NOD OFF OR FALL ASLEEP WHILE SITTING INACTIVE IN A PUBLIC PLACE: 0
HOW LIKELY ARE YOU TO NOD OFF OR FALL ASLEEP WHEN YOU ARE A PASSENGER IN A CAR FOR AN HOUR WITHOUT A BREAK: 0
HOW LIKELY ARE YOU TO NOD OFF OR FALL ASLEEP WHILE SITTING AND READING: 2
HOW LIKELY ARE YOU TO NOD OFF OR FALL ASLEEP WHILE WATCHING TV: 0

## 2022-04-25 NOTE — PROGRESS NOTES
Keyonna Almanza         : 1950    Diagnosis: [x] MC (G47.33) [] CSA (G47.31) [] Apnea (G47.30)   Length of Need: [x] 13 Months [] 99 Months [] Other:    Machine (MEGAN!): [] Respironics Dream Station      Auto [] ResMed AirSense     Auto [] Other:     []  CPAP () [] Bilevel ()   Mode: [] Auto [] Spontaneous    Mode: [] Auto [] Spontaneous              Comfort Settings:        Humidifier: [] Heated ()        [x] Water chamber replacement ()/ 1 per 6 months        Mask:   [] Nasal () /1 per 3 months [x] Full Face () /1 per 3 months   [] Patient choice -Size and fit mask [x] Patient Choice - Size and fit mask   [] Dispense:  [] Dispense:    [] Headgear () / 1 per 3 months [x] Headgear () / 1 per 3 months   [] Replacement Nasal Cushion ()/2 per month [x] Interface Replacement ()/1 per month   [] Replacement Nasal Pillows ()/2 per month         Tubing: [x] Heated ()/1 per 3 months    [] Standard ()/1 per 3 months [] Other:           Filters: [x] Non-disposable ()/1 per 6 months     [x] Ultra-Fine, Disposable ()/2 per month        Miscellaneous: [] Chin Strap ()/ 1 per 6 months [] O2 bleed-in:       LPM   [] Oximetry on CPAP/Bilevel []  Other:    [x] Modem: ()         Start Order Date: 22    MEDICAL JUSTIFICATION:  I, the undersigned, certify that the above prescribed supplies are medically necessary for this patients wellbeing. In my opinion, the supplies are both reasonable and necessary in reference to accepted standards of medicalpractice in treatment of this patients condition.     Shyann Berg MD      NPI: [id number]       Order Signed Date: 22    Electronically signed by Shyann Berg MD on 2022 at [address] [place], [place] [id number]  [address]  [phone] (home)   [phone] (mobile)      Insurance Info (confirm with patient if correct):  Payor/Plan Subscr  Sex Relation Sub.  Ins. ID Effective Group Num

## 2022-04-25 NOTE — PROGRESS NOTES
Samella Lennox MD, [facility], [facility]  [address]  [address] [facility], [address] [phone]   [facility] [address]. [address] [place], [place] [address]Raymon Mccray 37 [phone]     Video Visit- Follow up    [facility], was evaluated through a synchronous (real-time) audio-video  encounter. The patient (or guardian if applicable) is aware that this is a billable  service, which includes applicable co-pays. This Virtual Visit was conducted with  patient's (and/or legal guardian's) consent. The visit was conducted pursuant to  the emergency declaration under the [address] [place] [address], 95 1072 waiver authority and the [employer] and  [employer] General Act. Patient identification was verified,  and a caregiver was present when appropriate. The patient was located in a  state where the provider was licensed to provide care. Assessment/Plan:      1. Obstructive sleep apnea (adult) (pediatric)  Assessment & Plan:  Chronic-progressing: Reviewed and analyzed results of physiologic download from patient's machine and reviewed with patient. Supplies and parts as needed for his machine. These are medically necessary. Limit caffeine use after 3pm. Based on the analyzed data will have the patient work on controlling the mask leak and then reassess the AHI. If not doing better patient will need an in lab titration to confirm further. Discussed with patient today the recent recall issued by [employer] for their Skoodat platform Pap machines. Reviewed that it affected a very small number of machines (0.03%) and seems to be exacerbated by using ozone disinfection or machine being exposed to a very high heat/humidity environment. Recommended the patient immediately discontinue use of any external ozone  if being used.   Discussed the risk of untreated sleep apnea (including but not limited to early morbidity mortality, motor vehicle accidents, and cardiovascular issues, etc.) versus the very small risk of foam degradation with use of the machine. Possible alternative may be to switch manufacturers for their machine but will need to see if their insurance company will allow that. 2. Hypertension, essential  Assessment & Plan:  Chronic- Stable. Discussed the importance of treating sleep apnea as part of the management of this disorder. Cont any meds per PCP and other physicians. 3. GERD without esophagitis  Assessment & Plan:  Chronic- Stable. Discussed the importance of treating sleep apnea as part of the management of this disorder. Cont any meds per PCP and other physicians. 4. Class 1 obesity due to excess calories with serious comorbidity and body mass index (BMI) of 30.0 to 30.9 in adult  Assessment & Plan:  Chronic-not stable:  Discussed importance of treating obstructive sleep apnea and getting sufficient sleep to assist with weight control. Encouraged him to work on weight loss through diet and exercise. Recommended DASH or Mediterranean diets. Reviewed, analyzed, and documented physiologic data from patient's PAP machine. This information was analyzed to assess complexity and medical decision making in regards to further testing and management. Diagnoses of Obstructive sleep apnea (adult) (pediatric), Hypertension, essential, GERD without esophagitis, and Class 1 obesity due to excess calories with serious comorbidity and body mass index (BMI) of 30.0 to 30.9 in adult were pertinent to this visit. The chronic medical conditions listed are directly related to the primary diagnosis listed above. The management of the primary diagnosis affects the secondary diagnosis and vice versa. Subjective:     Patient ID: Leo Jansen is a 73 y.o. male.     Chief Complaint   Patient presents with    Sleep Apnea     Subjective   HPI:    Machine Modem/Download Info:  Compliance (hours/night): 3.9 hrs/night  % of nights >= 4 hrs: 62.2 %  Download AHI (/hour): 33.7 /HR  Average IPAP Pressure: 11 cmH2O  Average EPAP Pressure: 9 cmH2O   AUTO BIPAP - Settings (Lacy)  IPAP Max: 25 cmH2O  EPAP Min: 9 cmH2O  Pressure Support Min: 2  Pressure Support Max: 7             Comfort Settings  Humidity Level (0-8): 1  Flex/EPR (0-3): 3       Feels he is doing well with his machine and sleeping well. He just had hip replacement surgery 3 weeks ago still has some pain at nighttime. Download shows very high leak and an AHI over 20. The patient states he is notices had some mask issues but recently changed his mask and thinks the leak is much better. [facility] phus    [employer] - Total score: 5    Current Outpatient Medications   Medication Instructions    amLODIPine (NORVASC) 10 mg, DAILY    apixaban (ELIQUIS) 5 mg, Oral, 2 TIMES DAILY    carvedilol (COREG) 12.5 mg, Oral, 2 TIMES DAILY WITH MEALS    cephALEXin (KEFLEX) 500 mg, Oral, DAILY, .   Take 1st capsule the night of surgery at 9PM.  Take 2nd capsule the morning after surgery at 6AM.    Cholecalciferol (VITAMIN D) 2000 units CAPS capsule Oral, DAILY    ferrous sulfate (IRON 325) 325 mg, Oral    spironolactone (ALDACTONE) 12.5 mg, Oral, DAILY    tamsulosin (FLOMAX) 0.4 mg, Oral, DAILY        Electronically signed by Zen Mccann MD on 4/25/2022 at 11:19 AM

## 2022-04-25 NOTE — LETTER
[facility]  [address] [address] [address]  [address] [place] [address]  Phone: [phone]  Fax: [phone]    Donny Arriaza MD    April 25, 2022     Jaleesa Izquierdo MD  [facility] [address]    Patient: Bandar Banuelos   MR Number: [id number]   YOB: 1950   Date of Visit: 4/25/2022       Dear Javan MUIR:    Thank you for referring [facility] to me for evaluation/treatment. Below are the relevant portions of my assessment and plan of care. 1. Obstructive sleep apnea (adult) (pediatric)  Assessment & Plan:  Chronic-progressing: Reviewed and analyzed results of physiologic download from patient's machine and reviewed with patient. Supplies and parts as needed for his machine. These are medically necessary. Limit caffeine use after 3pm. Based on the analyzed data will have the patient work on controlling the mask leak and then reassess the AHI. If not doing better patient will need an in lab titration to confirm further. Discussed with patient today the recent recall issued by [employer] for their Sh[employer]i platform Pap machines. Reviewed that it affected a very small number of machines (0.03%) and seems to be exacerbated by using ozone disinfection or machine being exposed to a very high heat/humidity environment. Recommended the patient immediately discontinue use of any external ozone  if being used. Discussed the risk of untreated sleep apnea (including but not limited to early morbidity mortality, motor vehicle accidents, and cardiovascular issues, etc.) versus the very small risk of foam degradation with use of the machine. Possible alternative may be to switch manufacturers for their machine but will need to see if their insurance company will allow that. 2. Hypertension, essential  Assessment & Plan:  Chronic- Stable. Discussed the importance of treating sleep apnea as part of the management of this disorder.   Cont any meds per PCP and other physicians. 3. GERD without esophagitis  Assessment & Plan:  Chronic- Stable. Discussed the importance of treating sleep apnea as part of the management of this disorder. Cont any meds per PCP and other physicians. 4. Class 1 obesity due to excess calories with serious comorbidity and body mass index (BMI) of 30.0 to 30.9 in adult  Assessment & Plan:  Chronic-not stable:  Discussed importance of treating obstructive sleep apnea and getting sufficient sleep to assist with weight control. Encouraged him to work on weight loss through diet and exercise. Recommended DASH or Mediterranean diets. Reviewed, analyzed, and documented physiologic data from patient's PAP machine. This information was analyzed to assess complexity and medical decision making in regards to further testing and management. Diagnoses of Obstructive sleep apnea (adult) (pediatric), Hypertension, essential, GERD without esophagitis, and Class 1 obesity due to excess calories with serious comorbidity and body mass index (BMI) of 30.0 to 30.9 in adult were pertinent to this visit. The chronic medical conditions listed are directly related to the primary diagnosis listed above. The management of the primary diagnosis affects the secondary diagnosis and vice versa. If you have questions, please do not hesitate to call me. I look forward to following Mayi along with you.     Sincerely,      Colleen Jon MD

## 2022-04-25 NOTE — ASSESSMENT & PLAN NOTE
Chronic-progressing: Reviewed and analyzed results of physiologic download from patient's machine and reviewed with patient. Supplies and parts as needed for his machine. These are medically necessary. Limit caffeine use after 3pm. Based on the analyzed data will have the patient work on controlling the mask leak and then reassess the AHI. If not doing better patient will need an in lab titration to confirm further. Discussed with patient today the recent recall issued by [employer] for their [employer] platform Pap machines. Reviewed that it affected a very small number of machines (0.03%) and seems to be exacerbated by using ozone disinfection or machine being exposed to a very high heat/humidity environment. Recommended the patient immediately discontinue use of any external ozone  if being used. Discussed the risk of untreated sleep apnea (including but not limited to early morbidity mortality, motor vehicle accidents, and cardiovascular issues, etc.) versus the very small risk of foam degradation with use of the machine. Possible alternative may be to switch manufacturers for their machine but will need to see if their insurance company will allow that.

## 2022-04-26 ENCOUNTER — HOSPITAL ENCOUNTER (OUTPATIENT)
Dept: PHYSICAL THERAPY | Age: 72
Setting detail: THERAPIES SERIES
Discharge: HOME | End: 2022-04-26
Payer: MEDICARE

## 2022-04-26 PROCEDURE — 97110 THERAPEUTIC EXERCISES: CPT

## 2022-04-26 PROCEDURE — 97530 THERAPEUTIC ACTIVITIES: CPT

## 2022-04-26 PROCEDURE — 97112 NEUROMUSCULAR REEDUCATION: CPT

## 2022-04-26 NOTE — FLOWSHEET NOTE
Neuromuscular Re-ed (05382)       [facility] TKE blue 1 20    Standing hip abd.   2 10B                                Manual Intervention (15904)       Knee mobs/PROM       Tib/Fem Mobs       Patella Mobs       Ankle mobs                         Modalities:     Pt. Education:  -pt educated on diagnosis, prognosis and expectations for rehab  -all pt questions were answered    Home Exercise Program:  Access Code: [facility]  URL: [email or web address]  Date: 04/19/2022  Prepared by: Mirna Robertson    Exercises  Lying Prone with 1 Pillow - 2 x daily - 7 x weekly - 5m hold  Hooklying Gluteal Sets - 2 x daily - 7 x weekly - 10 reps - 5s hold  Supine Hip Adduction Isometric with Ball - 2 x daily - 7 x weekly - 10 reps - 5s hold  Seated Long Arc Quad - 2 x daily - 7 x weekly - 20 reps  Seated Hamstring Stretch - 2 x daily - 7 x weekly - 3 reps - 20s hold      Therapeutic Exercise and NMR EXR  [x] (21089) Provided verbal/tactile cueing for activities related to strengthening, flexibility, endurance, ROM for improvements in LE, proximal hip, and core control with self care, mobility, lifting, ambulation.  [] (90777) Provided verbal/tactile cueing for activities related to improving balance, coordination, kinesthetic sense, posture, motor skill, proprioception  to assist with LE, proximal hip, and core control in self care, mobility, lifting, ambulation and eccentric single leg control.   [] (18543) Therapist is in constant attendance of 2 or more patients providing skilled therapy interventions, but not providing any significant amount of measurable one-on-one time to either patient, for improvements in LE, proximal hip, and core control in self care, mobility, lifting, ambulation and eccentric single leg control.      NMR and Therapeutic Activities:    [x] (02965 or 33486) Provided verbal/tactile cueing for activities related to improving balance, coordination, kinesthetic sense, posture, motor skill, proprioception and motor activation to allow for proper function of core, proximal hip and LE with self care and ADLs  [] (49933) Gait Re-education- Provided training and instruction to the patient for proper LE, core and proximal hip recruitment and positioning and eccentric body weight control with ambulation re-education including up and down stairs     Home Exercise Program:    [x] (06809) Reviewed/Progressed HEP activities related to strengthening, flexibility, endurance, ROM of core, proximal hip and LE for functional self-care, mobility, lifting and ambulation/stair navigation   [] (93620)Reviewed/Progressed HEP activities related to improving balance, coordination, kinesthetic sense, posture, motor skill, proprioception of core, proximal hip and LE for self care, mobility, lifting, and ambulation/stair navigation      Manual Treatments:  PROM / STM / Oscillations-Mobs:  G-I, II, III, IV (PA's, Inf., Post.)  [] (61702) Provided manual therapy to mobilize LE, proximal hip and/or LS spine soft tissue/joints for the purpose of modulating pain, promoting relaxation,  increasing ROM, reducing/eliminating soft tissue swelling/inflammation/restriction, improving soft tissue extensibility and allowing for proper ROM for normal function with self care, mobility, lifting and ambulation. Modalities:  [] (09521) Vasopneumatic compression: Utilized vasopneumatic compression to decrease edema / swelling for the purpose of improving mobility and quad tone / recruitment which will allow for increased overall function including but not limited to self-care, transfers, ambulation, and ascending / descending stairs.        Charges:  Timed Code Treatment Minutes: 42   Total Treatment Minutes: 42     [] EVAL - LOW (45457)   [] EVAL - MOD (20404)  [] EVAL - HIGH (50694)  [] RE-EVAL (04220)  [x] CY(56482) x  1     [] Ionto  [x] NMR (97622) x 1      [] Vaso  [] Manual (60948) x       [] Ultrasound  [x] TA x  1      [] [facility] Traction (52090)  [] Aquatic Therapy x      [] ES (un) (50535):   [] Home Management Training x  [] ES(attended) (66352)   [] Dry Needling 1-2 muscles (51893):  [] Dry Needling 3+ muscles (023717  [] Group:      [] Other:     GOALS:   Patient stated goal: recreational walking  [] Progressing: [] Met: [] Not Met: [] Adjusted    Therapist goals for Patient:   Short Term Goals: To be achieved in: 2 weeks  1. Independent in HEP and progression per patient tolerance, in order to prevent re-injury. [] Progressing: [] Met: [] Not Met: [] Adjusted  2. Patient will have a decrease in pain to facilitate improvement in movement, function, and ADLs as indicated by Functional Deficits. [] Progressing: [] Met: [] Not Met: [] Adjusted    Long Term Goals: To be achieved in: 8-10 weeks  1. FOTO score of at least 64 to assist with reaching prior level of function. [] Progressing: [] Met: [] Not Met: [] Adjusted  2. Patient will demonstrate increased AROM to 100+ L hip flexion without pain to allow for proper joint functioning as indicated by patients Functional Deficits. [] Progressing: [] Met: [] Not Met: [] Adjusted  3. Patient will demonstrate an increase in Strength to at least 4+/5 throughout as well as good proximal hip strength and control to allow for proper functional mobility as indicated by patients Functional Deficits. [] Progressing: [] Met: [] Not Met: [] Adjusted  4. Patient will return to functional activities including walking without AD and without increased symptoms or restriction. [] Progressing: [] Met: [] Not Met: [] Adjusted  5. Patient will demonstrate ability to perform reciprocal stairs and ramp walking in order to progress to walking outdoors with wife. [] Progressing: [] Met: [] Not Met: [] Adjusted    Overall Progression Towards Functional goals/ Treatment Progress Update:  [] Patient is progressing as expected towards functional goals listed.     [] Progression is slowed due to complexities/Impairments listed. [] Progression has been slowed due to co-morbidities. [x] Plan just implemented, too soon to assess goals progression <30days   [] Goals require adjustment due to lack of progress  [] Patient is not progressing as expected and requires additional follow up with physician  [] Other    Persisting Functional Limitations/Impairments:  [x]Sitting [x]Standing   [x]Walking [x]Stairs   [x]Transfers [x]ADLs   [x]Squatting/bending [x]Kneeling  [x]Housework []Job related tasks  [x]Driving [x]Sports/Recreation   [x]Sleeping []Other:    ASSESSMENT:  Pt was able to complete today's interventions without irritating L hip and noted less tightness following today's session. Pt required cueing for proper exercise pace and technique throughout treatment. Pt fatigued appropriately with each exercise set and after completing entire session. Reviewed HEP, pt demonstrated good understanding. Treatment/Activity Tolerance:  [x] Pt able to complete treatment [] Patient limited by fatique  [] Patient limited by pain  [] Patient limited by other medical complications  [] Other:     Prognosis:  [x] Good [] Fair  [] Poor    Patient Requires Follow-up: [x] Yes  [] No    Return to Play:    [x]  N/A   []  Stage 1: Intro to Strength   []  Stage 2: Return to Run and Strength   []  Stage 3: Return to Jump and Strength   []  Stage 4: Dynamic Strength and Agility   []  Stage 5: Sport Specific Training     []  Ready to Return to Play, Meets All Above Stages   []  Not Ready for Return to Sports   Comments:            PLAN: See eval. PT 1-2x / week for 8-10 weeks. [x] Continue per plan of care [] Alter current plan (see comments)  [] Plan of care initiated [] Hold pending MD visit [] Discharge    Electronically signed by: Emeterio Gaming, PTA, ATC      Note: If patient does not return for scheduled/ recommended follow up visits, this note will serve as a discharge from care along with most recent update on progress.

## 2022-04-27 ENCOUNTER — OFFICE VISIT (OUTPATIENT)
Dept: ORTHOPEDIC SURGERY | Age: 72
End: 2022-04-27
Payer: MEDICARE

## 2022-04-27 VITALS — BODY MASS INDEX: 31.25 KG/M2 | HEIGHT: 69 IN | WEIGHT: 211 LBS | RESPIRATION RATE: 16 BRPM

## 2022-04-27 DIAGNOSIS — Z96.642 STATUS POST TOTAL HIP REPLACEMENT, LEFT: Primary | ICD-10-CM

## 2022-04-27 PROCEDURE — 99024 POSTOP FOLLOW-UP VISIT: CPT | Performed by: ORTHOPAEDIC SURGERY

## 2022-04-27 NOTE — PROGRESS NOTES
ORTHOPAEDIC CONSULTATION NOTE    Chief Complaint   Patient presents with    Post-Op Check     left se[facility]ity 4-12-22       HPI   4/27/22  First postop check after left total hip arthroplasty  He reports he is doing well  Pain rated 1-2 out of 10  No incisional problems, no numbness or tingling  Doing outpatient physical therapy already, using single crutch for ambulation      4/12/22  OPERATION PERFORMED:  Left total hip arthroplasty, direct anterior. 1/19/22  73 y.o. male seen in consultation at the request of Lisa Mckeon MD for evaluation of left hip pain, discuss SERENITY:  Onset years  Injury/trauma none  History of symptoms as above  Pain is located lateral hip and groin   No distal radiation  Worse with activity, pivoting, sitting, working out  Better with rest   Previously saw Dr Carly Bailey, had left hip steroid injection which helped for few months, but symptoms have returned  Associated with stiffness  Denies subjective LLD  Numbness and/or tingling = no        No Known Allergies     Current Outpatient Medications   Medication Sig Dispense Refill    apixaban (ELIQUIS) 5 MG TABS tablet Take 5 mg by mouth 2 times daily Indications: Deep Vein Thrombosis of Lower Extremity      cephALEXin (KEFLEX) 500 MG capsule Take 1 capsule by mouth daily . Take 1st capsule the night of surgery at 9PM.  Take 2nd capsule the morning after surgery at 6AM. 2 capsule 0    spironolactone (ALDACTONE) 25 MG tablet Take 0.5 tablets by mouth daily 30 tablet 3    tamsulosin (FLOMAX) 0.4 MG capsule Take 0.4 mg by mouth daily      carvedilol (COREG) 12.5 MG tablet Take 1 tablet by mouth 2 times daily (with meals) 60 tablet 5    ferrous sulfate 325 (65 Fe) MG tablet Take 325 mg by mouth      Cholecalciferol (VITAMIN D) 2000 units CAPS capsule Take by mouth daily      amLODIPine (NORVASC) 10 MG tablet Take 10 mg by mouth daily. No current facility-administered medications for this visit.        Past Medical History:   Diagnosis Date    Anesthesia     high blood pressure after surgery    Cancer [facility])     COLON    DVT (deep venous thrombosis) (HCC)     GERD without esophagitis 5/2/2019    Hypertension     Hypertension, essential 5/2/2019    Obstructive sleep apnea (adult) (pediatric)         Past Surgical History:   Procedure Laterality Date    CATARACT REMOVAL WITH IMPLANT  08/14/2012    phaco left eye/ IOL    CATARACT REMOVAL WITH IMPLANT  8/28/2012    Right     COLONOSCOPY      HERNIA REPAIR       TERI    SHOULDER SURGERY      LEFT    SHOULDER SURGERY  12-26-13    BILATERAL LEVATOR ADVANCEMENT, EXCISION OF MULTIPLE NEVIS ON    SMALL INTESTINE SURGERY      TOTAL HIP ARTHROPLASTY Left 4/12/2022    LEFT TOTAL HIP REPLACEMENT DIRECT ANTERIOR - DEPUY ADVANCED performed by Arnel Abrams MD at [address]  04/19/2017    UPPER GASTROINTESTINAL ENDOSCOPY  06/06/2018    EUS, gastric nodule biopsy       Family History   Problem Relation Age of Onset    High Blood Pressure Mother        Social History     Socioeconomic History    Marital status:      Spouse name: Not on file    Number of children: Not on file    Years of education: Not on file    Highest education level: Not on file   Occupational History    Not on file   Tobacco Use    Smoking status: Never Smoker    Smokeless tobacco: Never Used   Vaping Use    Vaping Use: Never used   Substance and Sexual Activity    Alcohol use: No    Drug use: No    Sexual activity: Not on file   Other Topics Concern    Not on file   Social History Narrative    Not on file     Social Determinants of Health     Financial Resource Strain:     Difficulty of Paying Living Expenses: Not on file   Food Insecurity:     Worried About Running Out of Food in the Last Year: Not on file    [place]aelle of Food in the Last Year: Not on file   Transportation Needs:     Lack of Transportation (Medical): Not on file    Lack of Transportation (Non-Medical):  Not on file   Physical Activity:     Days of Exercise per Week: Not on file    Minutes of Exercise per Session: Not on file   Stress:     Feeling of Stress : Not on file   Social Connections:     Frequency of Communication with Friends and Family: Not on file    Frequency of Social Gatherings with Friends and Family: Not on file    Attends Oriental orthodox Services: Not on file    Active Member of [address] [place] [address] or Organizations: Not on file    Attends Club or Organization Meetings: Not on file    Marital Status: Not on file   Intimate Partner Violence:     Fear of Current or Ex-Partner: Not on file    Emotionally Abused: Not on file    Physically Abused: Not on file    Sexually Abused: Not on file   Housing Stability:     Unable to Pay for Housing in the Last Year: Not on file    Number of [place] in the Last Year: Not on file    Unstable Housing in the Last Year: Not on file        Vitals:    04/27/22 1037   Resp: 16   Weight: 211 lb (95.7 kg)   Height: 5' 9\" (1.753 m)       Physical Exam  Constitutional  well-groomed, well-nourished, Body mass index is 31.16 kg/m². Here with his wife  Psychiatric  very pleasant, normal mood & affect  Cardiovascular  RRR, negative peripheral edema, left posterior tibialis pulse 1+  Respiratory  respirations unlabored, on room air; mask on  Skin  no rashes, wounds, or lesions seen on exposed skin  Neurological - LLE SILT SP/DP/T/LFC nerve distributions; EHL/FHL/TA/GS/quad intact  Left hip -    Surgical incision is nicely healed   No erythema or drainage   Steri-Strips are intact   Appropriate amount of postop swelling is seen   Negative logroll        Imaging:  Images were personally reviewed by myself and discussed with the patient  AP pelvis and Left hip 2 views performed 4/27/22 - s/p uncemented total hip arthroplasty with implants in stable position. No hardware complications, no fractures.       Assessment & Plan:  73 y.o. male who presents with    Diagnosis Orders 1. Status post total hip replacement, left  XR HIP 2-3 VW W PELVIS LEFT       No orders of the defined types were placed in this encounter. Doing well  Weight bear as tolerated to operative leg. Continue outpatient PT  Okay to get surgical site wet in the shower now. The steristrips will gradually fall off. Tylenol and/or NSAIDs PRN  Ice as needed - 15 minutes on, 15 minutes off - to aid in pain and swelling. Make sure there is a barrier between the ice pack and your skin, such as a clean dry towel. Complete DVT prophylaxis regimen for 1 month postop. Use compression stockings during the day. Perform quad sets, ankle pumps throughout the day. Perform incentive spirometry every hour while awake, including at home.     Follow up in 3-4 months for check up      Guadalupe Cochran MD

## 2022-04-28 ENCOUNTER — HOSPITAL ENCOUNTER (OUTPATIENT)
Dept: PHYSICAL THERAPY | Age: 72
Setting detail: THERAPIES SERIES
Discharge: HOME | End: 2022-04-28
Payer: MEDICARE

## 2022-04-28 PROCEDURE — 97112 NEUROMUSCULAR REEDUCATION: CPT | Performed by: PHYSICAL THERAPIST

## 2022-04-28 PROCEDURE — 97530 THERAPEUTIC ACTIVITIES: CPT | Performed by: PHYSICAL THERAPIST

## 2022-04-28 PROCEDURE — 97110 THERAPEUTIC EXERCISES: CPT | Performed by: PHYSICAL THERAPIST

## 2022-04-28 NOTE — FLOWSHEET NOTE
[address] [place] [address]  Phone: [phone]   Fax: [phone]    Physical Therapy Treatment Note/ Progress Report:     Date:  2022    Patient Name:  Gracy Becker    :  1950  MRN: [id number]  Restrictions/Precautions:    Medical/Treatment Diagnosis Information:  Diagnosis: M16.12 (ICD-10-CM) - Arthritis of left hip  Treatment Diagnosis: L hip pain, weakness, decreased function s/p SERENITY  Insurance/Certification information:  PT Insurance Information:  [facility]  Physician Information:  Referring Practitioner: Dr. Ryann Nuñez of care signed (Y/N): [x]  Yes []  No     Date of Patient follow up with Physician:      Progress Report: []  Yes  [x]  No     Date Range for reporting period:  Beginnin22  Ending:     Progress report due (10 Rx/or 30 days whichever is less): visit #10 or  (date)     Recertification due (POC duration/ or 90 days whichever is less): visit #24 or 22 (date)     Visit # Insurance Allowable Auth required? Date Range   3/12 AIM  12 [x]  Yes  []  No 22-22       Latex Allergy:  [x]NO      []YES  Preferred Language for Healthcare:   [x]English       []other:    Functional Scale:           Date assessed:  [facility] physical FS primary measure score = 47; risk adjusted = 47  22    Pain level: 0/10     SUBJECTIVE:  Pt. Denies pain in his hip at this time. Pt. Notes that he continues to perform stairs in a step to pattern.      OBJECTIVE:     : gait: slight antalgic gait with single axillary crutch      RESTRICTIONS/PRECAUTIONS: L anterior SERENITY 22    Exercises/Interventions:     Therapeutic Exercise (53039)  Resistance / level Sets/sec Reps Notes / Cues   Bike - upright  4'     LAQ 3# 2 10    Supine heel slide flexion  2 10    EOB hamstring stretch  30\" 3    HR  3 15    Bridge  W/ BS 5\" 15    IB calf stretch  30\" 3    Standing HS curl 3# 2 10           Therapeutic Activities (14869)       squat To table 2 10    Step up 6\" 2 10 UE support   Lateral stepping  15ft 2x                         Neuromuscular Re-ed (78692)       CKC TKE purple 1 20    Standing hip abd.   2 10B    SLS balance  20\" 3                         Manual Intervention (13903)       Knee mobs/PROM       Tib/Fem Mobs       Patella Mobs       Ankle mobs                         Modalities:     Pt. Education:  -pt educated on diagnosis, prognosis and expectations for rehab  -all pt questions were answered    Home Exercise Program:  Access Code: [facility]  URL: [email or web address]  Date: 04/19/2022  Prepared by: Ramakrishna Vazquez    Exercises  Lying Prone with 1 Pillow - 2 x daily - 7 x weekly - 5m hold  Hooklying Gluteal Sets - 2 x daily - 7 x weekly - 10 reps - 5s hold  Supine Hip Adduction Isometric with Ball - 2 x daily - 7 x weekly - 10 reps - 5s hold  Seated Long Arc Quad - 2 x daily - 7 x weekly - 20 reps  Seated Hamstring Stretch - 2 x daily - 7 x weekly - 3 reps - 20s hold      Therapeutic Exercise and NMR EXR  [x] (60071) Provided verbal/tactile cueing for activities related to strengthening, flexibility, endurance, ROM for improvements in LE, proximal hip, and core control with self care, mobility, lifting, ambulation.  [] (34675) Provided verbal/tactile cueing for activities related to improving balance, coordination, kinesthetic sense, posture, motor skill, proprioception  to assist with LE, proximal hip, and core control in self care, mobility, lifting, ambulation and eccentric single leg control.   [] (30890) Therapist is in constant attendance of 2 or more patients providing skilled therapy interventions, but not providing any significant amount of measurable one-on-one time to either patient, for improvements in LE, proximal hip, and core control in self care, mobility, lifting, ambulation and eccentric single leg control.      NMR and Therapeutic Activities:    [x] (97513 or 22009) Provided verbal/tactile cueing for activities related to improving balance, coordination, kinesthetic sense, posture, motor skill, proprioception and motor activation to allow for proper function of core, proximal hip and LE with self care and ADLs  [] (16084) Gait Re-education- Provided training and instruction to the patient for proper LE, core and proximal hip recruitment and positioning and eccentric body weight control with ambulation re-education including up and down stairs     Home Exercise Program:    [x] (92267) Reviewed/Progressed HEP activities related to strengthening, flexibility, endurance, ROM of core, proximal hip and LE for functional self-care, mobility, lifting and ambulation/stair navigation   [] (62660)Reviewed/Progressed HEP activities related to improving balance, coordination, kinesthetic sense, posture, motor skill, proprioception of core, proximal hip and LE for self care, mobility, lifting, and ambulation/stair navigation      Manual Treatments:  PROM / STM / Oscillations-Mobs:  G-I, II, III, IV (PA's, Inf., Post.)  [] (15765) Provided manual therapy to mobilize LE, proximal hip and/or LS spine soft tissue/joints for the purpose of modulating pain, promoting relaxation,  increasing ROM, reducing/eliminating soft tissue swelling/inflammation/restriction, improving soft tissue extensibility and allowing for proper ROM for normal function with self care, mobility, lifting and ambulation. Modalities:  [] (98152) Vasopneumatic compression: Utilized vasopneumatic compression to decrease edema / swelling for the purpose of improving mobility and quad tone / recruitment which will allow for increased overall function including but not limited to self-care, transfers, ambulation, and ascending / descending stairs.        Charges:  Timed Code Treatment Minutes: 42   Total Treatment Minutes: 42     [] EVAL - LOW (76657)   [] EVAL - MOD (55621)  [] EVAL - HIGH (81265)  [] RE-EVAL (14168)  [x] NH(36210) x  1     [] Ionto  [x] NMR (88162) x 1 [] Vaso  [] Manual (64359) x       [] Ultrasound  [x] TA x  1      [] Mech Traction (01979)  [] Aquatic Therapy x      [] ES (un) (18985):   [] Home Management Training x  [] ES(attended) (93716)   [] Dry Needling 1-2 muscles (40244):  [] Dry Needling 3+ muscles (742830  [] Group:      [] Other:     GOALS:   Patient stated goal: recreational walking  [] Progressing: [] Met: [] Not Met: [] Adjusted    Therapist goals for Patient:   Short Term Goals: To be achieved in: 2 weeks  1. Independent in HEP and progression per patient tolerance, in order to prevent re-injury. [] Progressing: [] Met: [] Not Met: [] Adjusted  2. Patient will have a decrease in pain to facilitate improvement in movement, function, and ADLs as indicated by Functional Deficits. [] Progressing: [] Met: [] Not Met: [] Adjusted    Long Term Goals: To be achieved in: 8-10 weeks  1. FOTO score of at least 64 to assist with reaching prior level of function. [] Progressing: [] Met: [] Not Met: [] Adjusted  2. Patient will demonstrate increased AROM to 100+ L hip flexion without pain to allow for proper joint functioning as indicated by patients Functional Deficits. [] Progressing: [] Met: [] Not Met: [] Adjusted  3. Patient will demonstrate an increase in Strength to at least 4+/5 throughout as well as good proximal hip strength and control to allow for proper functional mobility as indicated by patients Functional Deficits. [] Progressing: [] Met: [] Not Met: [] Adjusted  4. Patient will return to functional activities including walking without AD and without increased symptoms or restriction. [] Progressing: [] Met: [] Not Met: [] Adjusted  5. Patient will demonstrate ability to perform reciprocal stairs and ramp walking in order to progress to walking outdoors with wife.    [] Progressing: [] Met: [] Not Met: [] Adjusted    Overall Progression Towards Functional goals/ Treatment Progress Update:  [] Patient is progressing as expected towards functional goals listed. [] Progression is slowed due to complexities/Impairments listed. [] Progression has been slowed due to co-morbidities. [x] Plan just implemented, too soon to assess goals progression <30days   [] Goals require adjustment due to lack of progress  [] Patient is not progressing as expected and requires additional follow up with physician  [] Other    Persisting Functional Limitations/Impairments:  [x]Sitting [x]Standing   [x]Walking [x]Stairs   [x]Transfers [x]ADLs   [x]Squatting/bending [x]Kneeling  [x]Housework []Job related tasks  [x]Driving [x]Sports/Recreation   [x]Sleeping []Other:    ASSESSMENT:  Pt. Tolerated therapy today without complaints. Pt. Demonstrating good mobility and improving functional strength. Pt. Continues to fatigue with table exercises but able to complete. Cueing with standing hip abd to maintain correct technique and reduce compensatory movements. Able to progress closed chain activities and add step ups, occasional cueing for control. Difficulty persisting with SLS balance due to weakness and deficits in proprioception. Pt. Requires continued progression of post-op protocol in order to restore functional strength for improved community navigation. Treatment/Activity Tolerance:  [x] Pt able to complete treatment [] Patient limited by fatique  [] Patient limited by pain  [] Patient limited by other medical complications  [] Other:     Prognosis:  [x] Good [] Fair  [] Poor    Patient Requires Follow-up: [x] Yes  [] No    Return to Play:    [x]  N/A   []  Stage 1: Intro to Strength   []  Stage 2: Return to Run and Strength   []  Stage 3: Return to Jump and Strength   []  Stage 4: Dynamic Strength and Agility   []  Stage 5: Sport Specific Training     []  Ready to Return to Play, Meets All Above Stages   []  Not Ready for Return to Sports   Comments:            PLAN: See eval. PT 1-2x / week for 8-10 weeks.    [x] Continue per plan of care [] Birth[employer] Payment current plan (see comments)  [] Plan of care initiated [] Hold pending MD visit [] Discharge    Electronically signed by: Barbie Barger PT      Note: If patient does not return for scheduled/ recommended follow up visits, this note will serve as a discharge from care along with most recent update on progress.

## 2022-05-03 ENCOUNTER — HOSPITAL ENCOUNTER (OUTPATIENT)
Dept: PHYSICAL THERAPY | Age: 72
Setting detail: THERAPIES SERIES
Discharge: HOME | End: 2022-05-03
Payer: MEDICARE

## 2022-05-03 ENCOUNTER — TELEPHONE (OUTPATIENT)
Dept: PULMONOLOGY | Age: 72
End: 2022-05-03

## 2022-05-03 PROCEDURE — 97112 NEUROMUSCULAR REEDUCATION: CPT | Performed by: PHYSICAL THERAPIST

## 2022-05-03 PROCEDURE — 97530 THERAPEUTIC ACTIVITIES: CPT | Performed by: PHYSICAL THERAPIST

## 2022-05-03 PROCEDURE — 97110 THERAPEUTIC EXERCISES: CPT | Performed by: PHYSICAL THERAPIST

## 2022-05-03 NOTE — FLOWSHEET NOTE
[address] [place] [address]  Phone: [phone]   Fax: [phone]    Physical Therapy Treatment Note/ Progress Report:     Date:  5/3/2022    Patient Name:  Soheila Del Valle    :  1950  MRN: [id number]  Restrictions/Precautions:    Medical/Treatment Diagnosis Information:  Diagnosis: M16.12 (ICD-10-CM) - Arthritis of left hip  Treatment Diagnosis: L hip pain, weakness, decreased function s/p SERENITY  Insurance/Certification information:  PT Insurance Information:  [facility]  Physician Information:  Referring Practitioner: Dr. Gwen Rod of care signed (Y/N): [x]  Yes []  No     Date of Patient follow up with Physician:      Progress Report: []  Yes  [x]  No     Date Range for reporting period:  Beginnin22  Ending:     Progress report due (10 Rx/or 30 days whichever is less): visit #10 or 21 (date)     Recertification due (POC duration/ or 90 days whichever is less): visit #24 or 22 (date)     Visit # Insurance Allowable Auth required? Date Range   4 15 AIM [x]  Yes  []  No 22-22       Latex Allergy:  [x]NO      []YES  Preferred Language for Healthcare:   [x]English       []other:    Functional Scale:           Date assessed:  TO physical FS primary measure score = 47; risk adjusted = 47  22    Pain level:4/10 \"achy\"     SUBJECTIVE:  Pt. States that his L hip is a little more sore and achy today. He continues to use single crutch but notes sometimes he forgets it when walking around the house. Pt. Notes that he continues to get stiff if he sits for too long.      OBJECTIVE:     : gait: slight antalgic gait with single axillary crutch      RESTRICTIONS/PRECAUTIONS: L anterior SERENITY 22    Exercises/Interventions:     Therapeutic Exercise (35832)  Resistance / level Sets/sec Reps Notes / Cues   Bike - upright  4'     LAQ 3# 2 10    Supine heel slide flexion  1 10    EOB hamstring stretch  30\" 3    HR  3 15    Bridge  W/ BS 5\" 15    IB calf stretch  30\" 3    Standing HS curl 3# 2 10           Therapeutic Activities (90846)       squat airex in chair 2 10    Step up 6\" 2 10 UE support   Lateral stepping  15ft 2x                         Neuromuscular Re-ed (14612)       CKC TKE purple 1 20    Standing hip abd.   2 10B    SLS balance  20\" 3                         Manual Intervention (26085)       Knee mobs/PROM       Tib/Fem Mobs       Patella Mobs       Ankle mobs                         Modalities:     Pt. Education:  -pt educated on diagnosis, prognosis and expectations for rehab  -all pt questions were answered    Home Exercise Program:  Access Code: [facility]  URL: [email or web address]  Date: 04/19/2022  Prepared by: Craige Anger    Exercises  Lying Prone with 1 Pillow - 2 x daily - 7 x weekly - 5m hold  Hooklying Gluteal Sets - 2 x daily - 7 x weekly - 10 reps - 5s hold  Supine Hip Adduction Isometric with Ball - 2 x daily - 7 x weekly - 10 reps - 5s hold  Seated Long Arc Quad - 2 x daily - 7 x weekly - 20 reps  Seated Hamstring Stretch - 2 x daily - 7 x weekly - 3 reps - 20s hold      Therapeutic Exercise and NMR EXR  [x] (84558) Provided verbal/tactile cueing for activities related to strengthening, flexibility, endurance, ROM for improvements in LE, proximal hip, and core control with self care, mobility, lifting, ambulation.  [] (01937) Provided verbal/tactile cueing for activities related to improving balance, coordination, kinesthetic sense, posture, motor skill, proprioception  to assist with LE, proximal hip, and core control in self care, mobility, lifting, ambulation and eccentric single leg control.   [] (30653) Therapist is in constant attendance of 2 or more patients providing skilled therapy interventions, but not providing any significant amount of measurable one-on-one time to either patient, for improvements in LE, proximal hip, and core control in self care, mobility, lifting, ambulation and eccentric single leg control. NMR and Therapeutic Activities:    [x] (93004 or 39883) Provided verbal/tactile cueing for activities related to improving balance, coordination, kinesthetic sense, posture, motor skill, proprioception and motor activation to allow for proper function of core, proximal hip and LE with self care and ADLs  [] (43521) Gait Re-education- Provided training and instruction to the patient for proper LE, core and proximal hip recruitment and positioning and eccentric body weight control with ambulation re-education including up and down stairs     Home Exercise Program:    [x] (20216) Reviewed/Progressed HEP activities related to strengthening, flexibility, endurance, ROM of core, proximal hip and LE for functional self-care, mobility, lifting and ambulation/stair navigation   [] (99659)Reviewed/Progressed HEP activities related to improving balance, coordination, kinesthetic sense, posture, motor skill, proprioception of core, proximal hip and LE for self care, mobility, lifting, and ambulation/stair navigation      Manual Treatments:  PROM / STM / Oscillations-Mobs:  G-I, II, III, IV (PA's, Inf., Post.)  [] (92210) Provided manual therapy to mobilize LE, proximal hip and/or LS spine soft tissue/joints for the purpose of modulating pain, promoting relaxation,  increasing ROM, reducing/eliminating soft tissue swelling/inflammation/restriction, improving soft tissue extensibility and allowing for proper ROM for normal function with self care, mobility, lifting and ambulation. Modalities:  [] (19132) Vasopneumatic compression: Utilized vasopneumatic compression to decrease edema / swelling for the purpose of improving mobility and quad tone / recruitment which will allow for increased overall function including but not limited to self-care, transfers, ambulation, and ascending / descending stairs.        Charges:  Timed Code Treatment Minutes: 40   Total Treatment Minutes: 45     [] EVAL - LOW (96910)   [] EVAL - MOD (53398)  [] EVAL - HIGH (16954)  [] RE-EVAL (60516)  [x] JV(35938) x  1     [] Ionto  [x] NMR (25405) x 1      [] Vaso  [] Manual (02252) x       [] Ultrasound  [x] TA x  1      [] Mech Traction (10732)  [] Aquatic Therapy x      [] ES (un) (22785):   [] Home Management Training x  [] ES(attended) (79606)   [] Dry Needling 1-2 muscles (47340):  [] Dry Needling 3+ muscles (455900  [] Group:      [] Other:     GOALS:   Patient stated goal: recreational walking  [] Progressing: [] Met: [] Not Met: [] Adjusted    Therapist goals for Patient:   Short Term Goals: To be achieved in: 2 weeks  1. Independent in HEP and progression per patient tolerance, in order to prevent re-injury. [] Progressing: [] Met: [] Not Met: [] Adjusted  2. Patient will have a decrease in pain to facilitate improvement in movement, function, and ADLs as indicated by Functional Deficits. [] Progressing: [] Met: [] Not Met: [] Adjusted    Long Term Goals: To be achieved in: 8-10 weeks  1. FOTO score of at least 64 to assist with reaching prior level of function. [] Progressing: [] Met: [] Not Met: [] Adjusted  2. Patient will demonstrate increased AROM to 100+ L hip flexion without pain to allow for proper joint functioning as indicated by patients Functional Deficits. [] Progressing: [] Met: [] Not Met: [] Adjusted  3. Patient will demonstrate an increase in Strength to at least 4+/5 throughout as well as good proximal hip strength and control to allow for proper functional mobility as indicated by patients Functional Deficits. [] Progressing: [] Met: [] Not Met: [] Adjusted  4. Patient will return to functional activities including walking without AD and without increased symptoms or restriction. [] Progressing: [] Met: [] Not Met: [] Adjusted  5. Patient will demonstrate ability to perform reciprocal stairs and ramp walking in order to progress to walking outdoors with wife.    [] Progressing: [] Met: [] Not Met: [] Adjusted    Overall Progression Towards Functional goals/ Treatment Progress Update:  [] Patient is progressing as expected towards functional goals listed. [] Progression is slowed due to complexities/Impairments listed. [] Progression has been slowed due to co-morbidities. [x] Plan just implemented, too soon to assess goals progression <30days   [] Goals require adjustment due to lack of progress  [] Patient is not progressing as expected and requires additional follow up with physician  [] Other    Persisting Functional Limitations/Impairments:  []Sitting [x]Standing   [x]Walking [x]Stairs   []Transfers [x]ADLs   [x]Squatting/bending [x]Kneeling  [x]Housework []Job related tasks  []Driving [x]Sports/Recreation   []Sleeping []Other:    ASSESSMENT:  Pt. Tolerated therapy today without complaints. Limited progressions due to persisting soreness following previous therapy session. Pt. Appropriately fatigued by resistance exercises. Able to ambulate on even ground throughout clinic with minimal deviations without AD. Pt. Instructed to begin to wean from crutch in controlled environments only. Pt. Continues to require UE support with balance activities. Improved control with step ups this date. Pt. Will continue to benefit from skilled therapy in order to restore functional strength in order to improve community navigation.        Treatment/Activity Tolerance:  [x] Pt able to complete treatment [] Patient limited by fatique  [] Patient limited by pain  [] Patient limited by other medical complications  [] Other:     Prognosis:  [x] Good [] Fair  [] Poor    Patient Requires Follow-up: [x] Yes  [] No    Return to Play:    [x]  N/A   []  Stage 1: Intro to Strength   []  Stage 2: Return to Run and Strength   []  Stage 3: Return to Jump and Strength   []  Stage 4: Dynamic Strength and Agility   []  Stage 5: Sport Specific Training     []  Ready to Return to Play, Meets All Above Stages   []  Not Ready for Return to Sports   Comments:            PLAN: See eval. PT 1-2x / week for 8-10 weeks. [x] Continue per plan of care [] Alter current plan (see comments)  [] Plan of care initiated [] Hold pending MD visit [] Discharge    Electronically signed by: Wilbert Durant, PT      Note: If patient does not return for scheduled/ recommended follow up visits, this note will serve as a discharge from care along with most recent update on progress.

## 2022-05-03 NOTE — TELEPHONE ENCOUNTER
Patient called to schedule a sooner follow up, we did not have anything available until August. He has a recalled machine and has started waking up feeling like the mask is crushing his face and the pressure is too intense. I let him know we could take a look at his settings, but it could also be an issue with his mask, I suggested he see his DME to get a different mask fit. He said he cannot afford that, and to let his provider know he will not be using his machine until his follow up in October.

## 2022-05-03 NOTE — TELEPHONE ENCOUNTER
Patient was just seen less than 2 weeks ago. We discussed that he needs to work with his DME company on the mask. He needs to check with them to see what his insurance company will allow him a new mask. This typically every 6 months that he needs to check with them.   We have discussed the previous risk of untreated sleep apnea so he is encouraged to keep using his machine but work with his equipment company to get a well fitting mask

## 2022-05-03 NOTE — TELEPHONE ENCOUNTER
Left message for return call. Patient was just seen less than 2 weeks ago. We discussed that he needs to work with his DME company on the mask. He needs to check with them to see what his insurance company will allow him a new mask. This typically every 6 months that he needs to check with them.   We have discussed the previous risk of untreated sleep apnea so he is encouraged to keep using his machine but work with his equipment company to get a well fitting mask

## 2022-05-10 ENCOUNTER — HOSPITAL ENCOUNTER (OUTPATIENT)
Dept: PHYSICAL THERAPY | Age: 72
Setting detail: THERAPIES SERIES
Discharge: HOME | End: 2022-05-10
Payer: MEDICARE

## 2022-05-10 NOTE — FLOWSHEET NOTE
Physical Therapy  Cancellation/No-show Note  Patient Name:  Bandar Banuelos  :  1950   Date:  5/10/2022  Cancelled visits to date: 1  No-shows to date: 0    Patient status for today's appointment patient:  [x]  Cancelled - 5/10  []  Rescheduled appointment  []  No-show     Reason given by patient:  []  Patient ill  []  Conflicting appointment  []  No transportation    []  Conflict with work  [x]  No reason given  []  Other:     Comments:      Phone call information:   []  Phone call made today to patient at _ time at number provided:      []  Patient answered, conversation as follows:    []  Patient did not answer, message left as follows:  []  Phone call not made today  [x]  Phone call not needed - pt contacted us to cancel and provided reason for cancellation.      Electronically signed by:  Vidal Del Angel PT

## 2022-05-12 ENCOUNTER — HOSPITAL ENCOUNTER (OUTPATIENT)
Dept: PHYSICAL THERAPY | Age: 72
Setting detail: THERAPIES SERIES
Discharge: HOME | End: 2022-05-12
Payer: MEDICARE

## 2022-05-12 PROCEDURE — 97110 THERAPEUTIC EXERCISES: CPT | Performed by: PHYSICAL THERAPIST

## 2022-05-12 PROCEDURE — 97530 THERAPEUTIC ACTIVITIES: CPT | Performed by: PHYSICAL THERAPIST

## 2022-05-12 NOTE — FLOWSHEET NOTE
[address] [place] [address]  Phone: [phone]   Fax: [phone]    Physical Therapy Treatment Note/ Progress Report:     Date:  2022    Patient Name:  Laura Rosen    :  1950  MRN: [id number]  Restrictions/Precautions:    Medical/Treatment Diagnosis Information:  Diagnosis: M16.12 (ICD-10-CM) - Arthritis of left hip  Treatment Diagnosis: L hip pain, weakness, decreased function s/p SERENITY  Insurance/Certification information:  PT Insurance Information:  [facility]  Physician Information:  Referring Practitioner: Dr. Edge Plants of care signed (Y/N): [x]  Yes []  No     Date of Patient follow up with Physician:      Progress Report: []  Yes  [x]  No     Date Range for reporting period:  Beginnin22  Ending:     Progress report due (10 Rx/or 30 days whichever is less): visit #10 or  (date)     Recertification due (POC duration/ or 90 days whichever is less): visit #24 or 22 (date)     Visit # Insurance Allowable Auth required? Date Range   5 15 AIM [x]  Yes  []  No 22-22       Latex Allergy:  [x]NO      []YES  Preferred Language for Healthcare:   [x]English       []other:    Functional Scale:           Date assessed:  TO physical FS primary measure score = 47; risk adjusted = 47  22    Pain level: 0/10 w/ ibuprofen;  5/10 \"achy\"     SUBJECTIVE:  Pt. States that he has been very sore for the past couple of days. Pt. Notes that he did some yardwork over the weekend and walked around [employer] without the crutch and feels that this contributed to the soreness. Pain is worst with sitting in a low chair. Pt. Notes that the soreness is in the front of his hip.      OBJECTIVE:     : gait: slight antalgic gait with single axillary crutch   Palpation: L hip flexor tender to palpation       RESTRICTIONS/PRECAUTIONS: L anterior SERENITY 22    Exercises/Interventions:     Therapeutic Exercise (93779)  Resistance / level Sets/sec Reps Notes / Cues   Bike - upright  4'     LAQ 3# 2 10       EOB hamstring stretch  30\" 3    EOB supine hip flexor stretch  30\" 3 Thigh supported   Prone quad stretch  30\" 3    HR  3 15    Bridge  W/ BS 5\" 15    IB calf stretch  30\" 3    Standing HS curl 3# 2 10           Therapeutic Activities (14757)       Sit to stand airex in chair 2 10    Step up 6\" 1 15 UE support   Lateral stepping  15ft 2x                         Neuromuscular Re-ed (14311)          Standing hip abd.   2 10B    SLS balance  20\" 2                         Manual Intervention (97335)       Knee mobs/PROM       Tib/Fem Mobs       Patella Mobs       Ankle mobs                         Modalities:     Pt. Education:  -pt educated on diagnosis, prognosis and expectations for rehab  -all pt questions were answered    Home Exercise Program:  Access Code: [facility]  URL: [email or web address]  Date: 04/19/2022  Prepared by: Ethelda Nefatli    Exercises  Lying Prone with 1 Pillow - 2 x daily - 7 x weekly - 5m hold  Hooklying Gluteal Sets - 2 x daily - 7 x weekly - 10 reps - 5s hold  Supine Hip Adduction Isometric with Ball - 2 x daily - 7 x weekly - 10 reps - 5s hold  Seated Long Arc Quad - 2 x daily - 7 x weekly - 20 reps  Seated Hamstring Stretch - 2 x daily - 7 x weekly - 3 reps - 20s hold      Therapeutic Exercise and NMR EXR  [x] (34942) Provided verbal/tactile cueing for activities related to strengthening, flexibility, endurance, ROM for improvements in LE, proximal hip, and core control with self care, mobility, lifting, ambulation.  [] (38364) Provided verbal/tactile cueing for activities related to improving balance, coordination, kinesthetic sense, posture, motor skill, proprioception  to assist with LE, proximal hip, and core control in self care, mobility, lifting, ambulation and eccentric single leg control.   [] (19712) Therapist is in constant attendance of 2 or more patients providing skilled therapy interventions, but not providing any significant amount of measurable one-on-one time to either patient, for improvements in LE, proximal hip, and core control in self care, mobility, lifting, ambulation and eccentric single leg control. NMR and Therapeutic Activities:    [x] (17823 or 98122) Provided verbal/tactile cueing for activities related to improving balance, coordination, kinesthetic sense, posture, motor skill, proprioception and motor activation to allow for proper function of core, proximal hip and LE with self care and ADLs  [] (52687) Gait Re-education- Provided training and instruction to the patient for proper LE, core and proximal hip recruitment and positioning and eccentric body weight control with ambulation re-education including up and down stairs     Home Exercise Program:    [x] (08914) Reviewed/Progressed HEP activities related to strengthening, flexibility, endurance, ROM of core, proximal hip and LE for functional self-care, mobility, lifting and ambulation/stair navigation   [] (31057)Reviewed/Progressed HEP activities related to improving balance, coordination, kinesthetic sense, posture, motor skill, proprioception of core, proximal hip and LE for self care, mobility, lifting, and ambulation/stair navigation      Manual Treatments:  PROM / STM / Oscillations-Mobs:  G-I, II, III, IV (PA's, Inf., Post.)  [] (17230) Provided manual therapy to mobilize LE, proximal hip and/or LS spine soft tissue/joints for the purpose of modulating pain, promoting relaxation,  increasing ROM, reducing/eliminating soft tissue swelling/inflammation/restriction, improving soft tissue extensibility and allowing for proper ROM for normal function with self care, mobility, lifting and ambulation.      Modalities:  [] (03716) Vasopneumatic compression: Utilized vasopneumatic compression to decrease edema / swelling for the purpose of improving mobility and quad tone / recruitment which will allow for increased overall function including but not limited to self-care, transfers, ambulation, and ascending / descending stairs. Charges:  Timed Code Treatment Minutes: 43   Total Treatment Minutes: 46     [] EVAL - LOW (40738)   [] EVAL - MOD (02107)  [] EVAL - HIGH (30574)  [] RE-EVAL (86914)  [x] WJ(32607) x  2     [] Ionto  [] NMR (53564) x       [] Vaso  [] Manual (71051) x       [] Ultrasound  [x] TA x  1      [] Mech Traction (88576)  [] Aquatic Therapy x      [] ES (un) (25225):   [] Home Management Training x  [] ES(attended) (36709)   [] Dry Needling 1-2 muscles (34354):  [] Dry Needling 3+ muscles (308103  [] Group:      [] Other:     GOALS:   Patient stated goal: recreational walking  [] Progressing: [] Met: [] Not Met: [] Adjusted    Therapist goals for Patient:   Short Term Goals: To be achieved in: 2 weeks  1. Independent in HEP and progression per patient tolerance, in order to prevent re-injury. [] Progressing: [] Met: [] Not Met: [] Adjusted  2. Patient will have a decrease in pain to facilitate improvement in movement, function, and ADLs as indicated by Functional Deficits. [] Progressing: [] Met: [] Not Met: [] Adjusted    Long Term Goals: To be achieved in: 8-10 weeks  1. FOTO score of at least 64 to assist with reaching prior level of function. [] Progressing: [] Met: [] Not Met: [] Adjusted  2. Patient will demonstrate increased AROM to 100+ L hip flexion without pain to allow for proper joint functioning as indicated by patients Functional Deficits. [] Progressing: [] Met: [] Not Met: [] Adjusted  3. Patient will demonstrate an increase in Strength to at least 4+/5 throughout as well as good proximal hip strength and control to allow for proper functional mobility as indicated by patients Functional Deficits. [] Progressing: [] Met: [] Not Met: [] Adjusted  4. Patient will return to functional activities including walking without AD and without increased symptoms or restriction.    [] Progressing: [] Met: [] Not Met: [] Adjusted  5. Patient will demonstrate ability to perform reciprocal stairs and ramp walking in order to progress to walking outdoors with wife. [] Progressing: [] Met: [] Not Met: [] Adjusted    Overall Progression Towards Functional goals/ Treatment Progress Update:  [] Patient is progressing as expected towards functional goals listed. [] Progression is slowed due to complexities/Impairments listed. [] Progression has been slowed due to co-morbidities. [x] Plan just implemented, too soon to assess goals progression <30days   [] Goals require adjustment due to lack of progress  [] Patient is not progressing as expected and requires additional follow up with physician  [] Other    Persisting Functional Limitations/Impairments:  []Sitting [x]Standing   [x]Walking [x]Stairs   []Transfers [x]ADLs   [x]Squatting/bending [x]Kneeling  [x]Housework []Job related tasks  []Driving [x]Sports/Recreation   []Sleeping []Other:    ASSESSMENT:   Pt. Tolerated therapy today with minimal complaints. Pt. Has increased hip flexor soreness and tenderness this date. Limited progression of strengthening activities and added stretching for hip flexors. Pt. Fatigued very quickly with closed chain activities. Mild hip drop with SLS balance activities. Pt. Instructed to continue to use AD in uncontrolled environments. Pt. Will continue to benefit from skilled therapy in order to restore functional strength to improve stair navigation.        Treatment/Activity Tolerance:  [x] Pt able to complete treatment [] Patient limited by fatique  [] Patient limited by pain  [] Patient limited by other medical complications  [] Other:     Prognosis:  [x] Good [] Fair  [] Poor    Patient Requires Follow-up: [x] Yes  [] No    Return to Play:    [x]  N/A   []  Stage 1: Intro to Strength   []  Stage 2: Return to Run and Strength   []  Stage 3: Return to Jump and Strength   []  Stage 4: Dynamic Strength and Agility   []  Stage 5: Sport Specific Training     []  Ready to Return to Play, Meets All Above Stages   []  Not Ready for Return to Sports   Comments:            PLAN: See eval. PT 1-2x / week for 8-10 weeks. [x] Continue per plan of care [] Alter current plan (see comments)  [] Plan of care initiated [] Hold pending MD visit [] Discharge    Electronically signed by: Hans Arias, PT      Note: If patient does not return for scheduled/ recommended follow up visits, this note will serve as a discharge from care along with most recent update on progress.

## 2022-05-17 ENCOUNTER — HOSPITAL ENCOUNTER (OUTPATIENT)
Dept: PHYSICAL THERAPY | Age: 72
Setting detail: THERAPIES SERIES
Discharge: HOME | End: 2022-05-17
Payer: MEDICARE

## 2022-05-17 PROCEDURE — 97110 THERAPEUTIC EXERCISES: CPT | Performed by: PHYSICAL THERAPIST

## 2022-05-17 PROCEDURE — 97530 THERAPEUTIC ACTIVITIES: CPT | Performed by: PHYSICAL THERAPIST

## 2022-05-17 NOTE — FLOWSHEET NOTE
[facility]  Phone: [phone]   Fax: [phone]    Physical Therapy Treatment Note/ Progress Report:     Date:  2022    Patient Name:  Cat Farris    :  1950  MRN: [id number]  Restrictions/Precautions:    Medical/Treatment Diagnosis Information:  Diagnosis: M16.12 (ICD-10-CM) - Arthritis of left hip  Treatment Diagnosis: L hip pain, weakness, decreased function s/p SERENITY  Insurance/Certification information:  PT Insurance Information:  [employer]  Physician Information:  Referring Practitioner: Dr. Esperanza Pacheco of care signed (Y/N): [x]  Yes []  No     Date of Patient follow up with Physician:      Progress Report: []  Yes  [x]  No     Date Range for reporting period:  Beginnin22  Ending:     Progress report due (10 Rx/or 30 days whichever is less): visit #10 or 9/43/10 (date)     Recertification due (POC duration/ or 90 days whichever is less): visit #24 or 22 (date)     Visit # Insurance Allowable Auth required? Date Range   6 15 AIM [x]  Yes  []  No 22-22       Latex Allergy:  [x]NO      []YES  Preferred Language for Healthcare:   [x]English       []other:    Functional Scale:           Date assessed:  TO physical FS primary measure score = 47; risk adjusted = 47  22    Pain level: 0/10 w/ ibuprofen;  6/10 \"achy\"     SUBJECTIVE: Pt. reports that he continues to have some soreness and ache in lateral hip and knee. Pt. Had a lot of difficulty getting comfortable to sleep last night. Pt. Notes that he was able to walk more yesterday outside.       OBJECTIVE:     : gait: slight antalgic gait with single axillary crutch   Palpation: L hip flexor tender to palpation        RESTRICTIONS/PRECAUTIONS: L anterior SERENITY 22    Exercises/Interventions:     Therapeutic Exercise (91536)  Resistance / level Sets/sec Reps Notes / Cues   Bike - upright  4'     LAQ 3# 2 10       EOB hamstring stretch  30\" 3 EOB supine hip flexor stretch  30\" 3 Thigh supported   Prone quad stretch  30\" 3    HR  3 15    Bridge  W/ BS 5\" 15    IB calf stretch  30\" 3    Standing HS curl 3# 2 10           Therapeutic Activities (32376)       Sit to stand airex in chair 2 10    Step up 6\" 1 15 UE support   Lateral stepping  15ft 2x                         Neuromuscular Re-ed (74397)          Standing hip abd. SLS balance                          Manual Intervention (44710)       Knee mobs/PROM       Tib/Fem Mobs       Patella Mobs       Ankle mobs       Roller stick L ITB 5'                Modalities:     Pt. Education:  -pt educated on diagnosis, prognosis and expectations for rehab  -all pt questions were answered    Home Exercise Program:  Access Code: [facility]  URL: [email or web address]  Date: 04/19/2022  Prepared by: Mirna Robertson    Exercises  Lying Prone with 1 Pillow - 2 x daily - 7 x weekly - 5m hold  Hooklying Gluteal Sets - 2 x daily - 7 x weekly - 10 reps - 5s hold  Supine Hip Adduction Isometric with Ball - 2 x daily - 7 x weekly - 10 reps - 5s hold  Seated Long Arc Quad - 2 x daily - 7 x weekly - 20 reps  Seated Hamstring Stretch - 2 x daily - 7 x weekly - 3 reps - 20s hold      Therapeutic Exercise and NMR EXR  [x] (23833) Provided verbal/tactile cueing for activities related to strengthening, flexibility, endurance, ROM for improvements in LE, proximal hip, and core control with self care, mobility, lifting, ambulation.  [] (20087) Provided verbal/tactile cueing for activities related to improving balance, coordination, kinesthetic sense, posture, motor skill, proprioception  to assist with LE, proximal hip, and core control in self care, mobility, lifting, ambulation and eccentric single leg control.   [] (69660) Therapist is in constant attendance of 2 or more patients providing skilled therapy interventions, but not providing any significant amount of measurable one-on-one time to either patient, for improvements in LE, proximal hip, and core control in self care, mobility, lifting, ambulation and eccentric single leg control. NMR and Therapeutic Activities:    [x] (92347 or 39502) Provided verbal/tactile cueing for activities related to improving balance, coordination, kinesthetic sense, posture, motor skill, proprioception and motor activation to allow for proper function of core, proximal hip and LE with self care and ADLs  [] (25335) Gait Re-education- Provided training and instruction to the patient for proper LE, core and proximal hip recruitment and positioning and eccentric body weight control with ambulation re-education including up and down stairs     Home Exercise Program:    [x] (51304) Reviewed/Progressed HEP activities related to strengthening, flexibility, endurance, ROM of core, proximal hip and LE for functional self-care, mobility, lifting and ambulation/stair navigation   [] (44206)Reviewed/Progressed HEP activities related to improving balance, coordination, kinesthetic sense, posture, motor skill, proprioception of core, proximal hip and LE for self care, mobility, lifting, and ambulation/stair navigation      Manual Treatments:  PROM / STM / Oscillations-Mobs:  G-I, II, III, IV (PA's, Inf., Post.)  [x] (89248) Provided manual therapy to mobilize LE, proximal hip and/or LS spine soft tissue/joints for the purpose of modulating pain, promoting relaxation,  increasing ROM, reducing/eliminating soft tissue swelling/inflammation/restriction, improving soft tissue extensibility and allowing for proper ROM for normal function with self care, mobility, lifting and ambulation.      Modalities:  [] (31942) Vasopneumatic compression: Utilized vasopneumatic compression to decrease edema / swelling for the purpose of improving mobility and quad tone / recruitment which will allow for increased overall function including but not limited to self-care, transfers, ambulation, and ascending / descending stairs. Charges:  Timed Code Treatment Minutes: 42   Total Treatment Minutes: 42     [] EVAL - LOW (29080)   [] EVAL - MOD (50902)  [] EVAL - HIGH (84716)  [] RE-EVAL (67121)  [x] RE(64981) x  2     [] Ionto  [] NMR (59860) x       [] Vaso  [] Manual (91423) x       [] Ultrasound  [x] TA x  1      [] Mech Traction (14161)  [] Aquatic Therapy x      [] ES (un) (82061):   [] Home Management Training x  [] ES(attended) (20567)   [] Dry Needling 1-2 muscles (75865):  [] Dry Needling 3+ muscles (710465  [] Group:      [] Other:     GOALS:   Patient stated goal: recreational walking  [] Progressing: [] Met: [] Not Met: [] Adjusted    Therapist goals for Patient:   Short Term Goals: To be achieved in: 2 weeks  1. Independent in HEP and progression per patient tolerance, in order to prevent re-injury. [] Progressing: [] Met: [] Not Met: [] Adjusted  2. Patient will have a decrease in pain to facilitate improvement in movement, function, and ADLs as indicated by Functional Deficits. [] Progressing: [] Met: [] Not Met: [] Adjusted    Long Term Goals: To be achieved in: 8-10 weeks  1. FOTO score of at least 64 to assist with reaching prior level of function. [] Progressing: [] Met: [] Not Met: [] Adjusted  2. Patient will demonstrate increased AROM to 100+ L hip flexion without pain to allow for proper joint functioning as indicated by patients Functional Deficits. [] Progressing: [] Met: [] Not Met: [] Adjusted  3. Patient will demonstrate an increase in Strength to at least 4+/5 throughout as well as good proximal hip strength and control to allow for proper functional mobility as indicated by patients Functional Deficits. [] Progressing: [] Met: [] Not Met: [] Adjusted  4. Patient will return to functional activities including walking without AD and without increased symptoms or restriction. [] Progressing: [] Met: [] Not Met: [] Adjusted  5.  Patient will demonstrate ability to perform reciprocal stairs and ramp walking in order to progress to walking outdoors with wife. [] Progressing: [] Met: [] Not Met: [] Adjusted    Overall Progression Towards Functional goals/ Treatment Progress Update:  [] Patient is progressing as expected towards functional goals listed. [] Progression is slowed due to complexities/Impairments listed. [] Progression has been slowed due to co-morbidities. [x] Plan just implemented, too soon to assess goals progression <30days   [] Goals require adjustment due to lack of progress  [] Patient is not progressing as expected and requires additional follow up with physician  [] Other    Persisting Functional Limitations/Impairments:  []Sitting [x]Standing   [x]Walking [x]Stairs   []Transfers [x]ADLs   [x]Squatting/bending [x]Kneeling  [x]Housework []Job related tasks  []Driving [x]Sports/Recreation   []Sleeping []Other:    ASSESSMENT:   Pt. Tolerated therapy today with minimal complaints. Discussed sleep position modifications. Reviewed correct technique with stretches. Pt. Presents with ITB tenderness. Added manual treatment to improve ITB symptoms. Some difficulty with step ups this date, required UE support. Pt. Requires continued progression of post-op protocol in order to restore functional strength and reduce symptoms. Treatment/Activity Tolerance:  [x] Pt able to complete treatment [] Patient limited by fatique  [] Patient limited by pain  [] Patient limited by other medical complications  [] Other:     Prognosis:  [x] Good [] Fair  [] Poor    Patient Requires Follow-up: [x] Yes  [] No    Return to Play:    [x]  N/A   []  Stage 1: Intro to Strength   []  Stage 2: Return to Run and Strength   []  Stage 3: Return to Jump and Strength   []  Stage 4: Dynamic Strength and Agility   []  Stage 5: Sport Specific Training     []  Ready to Return to Play, Meets All Above Stages   []  Not Ready for Return to Sports   Comments:            PLAN: See eval. PT 1-2x / week for 8-10 weeks.    [x] Continue per plan of care [] Alter current plan (see comments)  [] Plan of care initiated [] Hold pending MD visit [] Discharge    Electronically signed by: Preston Moore PT      Note: If patient does not return for scheduled/ recommended follow up visits, this note will serve as a discharge from care along with most recent update on progress.

## 2022-05-19 ENCOUNTER — HOSPITAL ENCOUNTER (OUTPATIENT)
Dept: PHYSICAL THERAPY | Age: 72
Setting detail: THERAPIES SERIES
Discharge: HOME | End: 2022-05-19
Payer: MEDICARE

## 2022-05-19 PROCEDURE — 97110 THERAPEUTIC EXERCISES: CPT | Performed by: PHYSICAL THERAPIST

## 2022-05-19 PROCEDURE — 97530 THERAPEUTIC ACTIVITIES: CPT | Performed by: PHYSICAL THERAPIST

## 2022-05-19 NOTE — PROGRESS NOTES
[address] [facility]  Phone: [phone]   Fax: [phone]    Physical Therapy Treatment Note/ Progress Report:     Date:  2022    Patient Name:  Aubrie Ortega    :  1950  MRN: [id number]  Restrictions/Precautions:    Medical/Treatment Diagnosis Information:  Diagnosis: M16.12 (ICD-10-CM) - Arthritis of left hip  Treatment Diagnosis: L hip pain, weakness, decreased function s/p SERENITY  Insurance/Certification information:  PT Insurance Information:  [employer]  Physician Information:  Referring Practitioner: Dr. Vasile Burns of care signed (Y/N): [x]  Yes []  No     Date of Patient follow up with Physician:      Progress Report: [x]  Yes  []  No     Date Range for reporting period:  Beginnin22  PN: 22  Ending:     Progress report due (10 Rx/or 30 days whichever is less): visit #10 or  (date)     Recertification due (POC duration/ or 90 days whichever is less): visit #24 or 22 (date)     Visit # Insurance Allowable Auth required? Date Range   7 15 AIM [x]  Yes  []  No 22-22       Latex Allergy:  [x]NO      []YES  Preferred Language for Healthcare:   [x]English       []other:    Functional Scale:           Date assessed:  FOTO physical FS primary measure score = 47; risk adjusted = 47  22  FOTO physical FS primary measure score = 53     22    Pain level: 3/10    SUBJECTIVE: Pt. Reports that he continues to have some pain through anterior and lateral hip. Pt. Notes that the pain is a little better than previous session and he feels that the manual treatment helped with the pain.      OBJECTIVE:     : gait: slight antalgic gait with single axillary crutch, increased lateral trunk lean     PROM AROM     L R L R   Hip Flexion ~100        Hip Abduction           Hip ER           Hip IR                 Strength (0-5) / Myotomes Left Right   Hip Flexion - seated (L1-2)  4 4+    Hip Abduction - seated  4- 4-    Quads (L2-4) 4 4+   Hamstrings 4 4                    RESTRICTIONS/PRECAUTIONS: L anterior SERENITY 4/12/22    Exercises/Interventions:     Therapeutic Exercise (86390)  Resistance / level Sets/sec Reps Notes / Cues   Bike - upright  4'     LAQ 4# 2 10       EOB hamstring stretch  30\" 3    EOB supine hip flexor stretch  30\" 3 Thigh supported   Prone quad stretch     HR  3 15    Bridge  W/ BS 5\" 15    IB calf stretch  30\" 3    Standing HS curl 4# 2 10           Therapeutic Activities (94072)       Sit to stand airex in chair 2 10    Step up 6\" 1 15 UE support   Lateral stepping  15ft 2x    Leg press # 2 15                  Neuromuscular Re-ed (15079)          Standing hip abd. SLS balance                          Manual Intervention (92576)       Knee mobs/PROM       Tib/Fem Mobs       Patella Mobs       Ankle mobs       Roller stick L ITB 4'                Modalities:     Pt. Education:  -pt educated on diagnosis, prognosis and expectations for rehab  -all pt questions were answered    Home Exercise Program:  Access Code: [facility]  URL: [email or web address]  Date: 04/19/2022  Prepared by: Rigo Cast    Exercises  Lying Prone with 1 Pillow - 2 x daily - 7 x weekly - 5m hold  Hooklying Gluteal Sets - 2 x daily - 7 x weekly - 10 reps - 5s hold  Supine Hip Adduction Isometric with Ball - 2 x daily - 7 x weekly - 10 reps - 5s hold  Seated Long Arc Quad - 2 x daily - 7 x weekly - 20 reps  Seated Hamstring Stretch - 2 x daily - 7 x weekly - 3 reps - 20s hold      Therapeutic Exercise and NMR EXR  [x] (03300) Provided verbal/tactile cueing for activities related to strengthening, flexibility, endurance, ROM for improvements in LE, proximal hip, and core control with self care, mobility, lifting, ambulation.  [] (91750) Provided verbal/tactile cueing for activities related to improving balance, coordination, kinesthetic sense, posture, motor skill, proprioception  to assist with LE, proximal hip, and core control in self care, mobility, lifting, ambulation and eccentric single leg control.   [] (12075) Therapist is in constant attendance of 2 or more patients providing skilled therapy interventions, but not providing any significant amount of measurable one-on-one time to either patient, for improvements in LE, proximal hip, and core control in self care, mobility, lifting, ambulation and eccentric single leg control. NMR and Therapeutic Activities:    [x] (01527 or 27111) Provided verbal/tactile cueing for activities related to improving balance, coordination, kinesthetic sense, posture, motor skill, proprioception and motor activation to allow for proper function of core, proximal hip and LE with self care and ADLs  [] (85891) Gait Re-education- Provided training and instruction to the patient for proper LE, core and proximal hip recruitment and positioning and eccentric body weight control with ambulation re-education including up and down stairs     Home Exercise Program:    [x] (44852) Reviewed/Progressed HEP activities related to strengthening, flexibility, endurance, ROM of core, proximal hip and LE for functional self-care, mobility, lifting and ambulation/stair navigation   [] (72262)Reviewed/Progressed HEP activities related to improving balance, coordination, kinesthetic sense, posture, motor skill, proprioception of core, proximal hip and LE for self care, mobility, lifting, and ambulation/stair navigation      Manual Treatments:  PROM / STM / Oscillations-Mobs:  G-I, II, III, IV (PA's, Inf., Post.)  [x] (20302) Provided manual therapy to mobilize LE, proximal hip and/or LS spine soft tissue/joints for the purpose of modulating pain, promoting relaxation,  increasing ROM, reducing/eliminating soft tissue swelling/inflammation/restriction, improving soft tissue extensibility and allowing for proper ROM for normal function with self care, mobility, lifting and ambulation.      Modalities:  [] (20716) Vasopneumatic compression: Utilized vasopneumatic compression to decrease edema / swelling for the purpose of improving mobility and quad tone / recruitment which will allow for increased overall function including but not limited to self-care, transfers, ambulation, and ascending / descending stairs. Charges:  Timed Code Treatment Minutes: 40   Total Treatment Minutes: 40     [] EVAL - LOW (08472)   [] EVAL - MOD (43717)  [] EVAL - HIGH (67369)  [] RE-EVAL (42425)  [x] PG(59812) x  2     [] Ionto  [] NMR (51125) x       [] Vaso  [] Manual (38265) x       [] Ultrasound  [x] TA x  1      [] Mech Traction (14618)  [] Aquatic Therapy x      [] ES (un) (87712):   [] Home Management Training x  [] ES(attended) (95777)   [] Dry Needling 1-2 muscles (41287):  [] Dry Needling 3+ muscles (473246  [] Group:      [] Other:     GOALS:   Patient stated goal: recreational walking  [x] Progressing: [] Met: [] Not Met: [] Adjusted    Therapist goals for Patient:   Short Term Goals: To be achieved in: 2 weeks  1. Independent in HEP and progression per patient tolerance, in order to prevent re-injury. [] Progressing: [x] Met: [] Not Met: [] Adjusted  2. Patient will have a decrease in pain to facilitate improvement in movement, function, and ADLs as indicated by Functional Deficits. [x] Progressing: [] Met: [] Not Met: [] Adjusted    Long Term Goals: To be achieved in: 8-10 weeks  1. FOTO score of at least 64 to assist with reaching prior level of function. [x] Progressing: [] Met: [] Not Met: [] Adjusted  2. Patient will demonstrate increased AROM to 100+ L hip flexion without pain to allow for proper joint functioning as indicated by patients Functional Deficits. [x] Progressing: [] Met: [] Not Met: [] Adjusted  3.  Patient will demonstrate an increase in Strength to at least 4+/5 throughout as well as good proximal hip strength and control to allow for proper functional mobility as indicated by patients Functional Deficits. [x] Progressing: [] Met: [] Not Met: [] Adjusted  4. Patient will return to functional activities including walking without AD and without increased symptoms or restriction. [x] Progressing: [] Met: [] Not Met: [] Adjusted  5. Patient will demonstrate ability to perform reciprocal stairs and ramp walking in order to progress to walking outdoors with wife. [x] Progressing: [] Met: [] Not Met: [] Adjusted    Overall Progression Towards Functional goals/ Treatment Progress Update:  [x] Patient is progressing as expected towards functional goals listed. [] Progression is slowed due to complexities/Impairments listed. [] Progression has been slowed due to co-morbidities. [] Plan just implemented, too soon to assess goals progression <30days   [] Goals require adjustment due to lack of progress  [] Patient is not progressing as expected and requires additional follow up with physician  [] Other    Persisting Functional Limitations/Impairments:  []Sitting [x]Standing   [x]Walking [x]Stairs   []Transfers [x]ADLs   [x]Squatting/bending [x]Kneeling  [x]Housework []Job related tasks  []Driving [x]Sports/Recreation   []Sleeping []Other:    ASSESSMENT:   Pt. Tolerated therapy today without complaints. Noted improvements in motion and strength, but continues to have deficits compared to contralateral limb. Decreased tenderness and pain in ITB this date. Pt. Instructed to continue to wean from crutch. Improved control with closed chain activities including stairs and sit to stand. Some balance and proprioceptive deficits persisting. Added leg press without issue. Pt. Will continue to benefit from skilled therapy in order to improve functional strength for improved community navigation.        Treatment/Activity Tolerance:  [x] Pt able to complete treatment [] Patient limited by fatique  [] Patient limited by pain  [] Patient limited by other medical complications  [] Other:     Prognosis:  [x] Good [] Fair  [] Poor    Patient Requires Follow-up: [x] Yes  [] No    Return to Play:    [x]  N/A   []  Stage 1: Intro to Strength   []  Stage 2: Return to Run and Strength   []  Stage 3: Return to Jump and Strength   []  Stage 4: Dynamic Strength and Agility   []  Stage 5: Sport Specific Training     []  Ready to Return to Play, Meets All Above Stages   []  Not Ready for Return to Sports   Comments:            PLAN: See eval. PT 1-2x / week for 8-10 weeks. [x] Continue per plan of care [] Alter current plan (see comments)  [] Plan of care initiated [] Hold pending MD visit [] Discharge    Electronically signed by: Steffanie Brown PT      Note: If patient does not return for scheduled/ recommended follow up visits, this note will serve as a discharge from care along with most recent update on progress.

## 2022-05-24 ENCOUNTER — APPOINTMENT (OUTPATIENT)
Dept: PHYSICAL THERAPY | Age: 72
End: 2022-05-24
Payer: MEDICARE

## 2022-05-31 ENCOUNTER — APPOINTMENT (OUTPATIENT)
Dept: PHYSICAL THERAPY | Age: 72
End: 2022-05-31
Payer: MEDICARE

## 2022-06-03 ENCOUNTER — OFFICE VISIT (OUTPATIENT)
Dept: ORTHOPEDIC SURGERY | Age: 72
End: 2022-06-03

## 2022-06-03 VITALS — HEIGHT: 69 IN | WEIGHT: 211 LBS | BODY MASS INDEX: 31.25 KG/M2

## 2022-06-03 DIAGNOSIS — M17.0 ARTHRITIS OF BOTH KNEES: Primary | ICD-10-CM

## 2022-06-03 PROCEDURE — 1123F ACP DISCUSS/DSCN MKR DOCD: CPT | Performed by: ORTHOPAEDIC SURGERY

## 2022-06-03 PROCEDURE — 99213 OFFICE O/P EST LOW 20 MIN: CPT | Performed by: ORTHOPAEDIC SURGERY

## 2022-06-03 NOTE — PROGRESS NOTES
ORTHOPAEDIC CONSULTATION NOTE    Chief Complaint   Patient presents with    Knee Pain     Bilat Knee Pain       HPI   6/3/22  Mayi returns to clinic today for assessment of new problems: Bilateral knee pain  Left worse than right  Chronic in nature  Worse anteriorly and medially  Saw Dr. Thuy Chew for his knees previously and received viscosupplementation last year with good relief  He is interested in possibly repeating those shots  Reports his left hip is doing great      4/27/22  First postop check after left total hip arthroplasty  He reports he is doing well  Pain rated 1-2 out of 10  No incisional problems, no numbness or tingling  Doing outpatient physical therapy already, using single crutch for ambulation      4/12/22  OPERATION PERFORMED:  Left total hip arthroplasty, direct anterior. 1/19/22  73 y.o. male seen in consultation at the request of Shala Irvin MD for evaluation of left hip pain, discuss SERENITY:  Onset years  Injury/trauma none  History of symptoms as above  Pain is located lateral hip and groin   No distal radiation  Worse with activity, pivoting, sitting, working out  Better with rest   Previously saw Dr Thuy Chew, had left hip steroid injection which helped for few months, but symptoms have returned  Associated with stiffness  Denies subjective LLD  Numbness and/or tingling = no        No Known Allergies     Current Outpatient Medications   Medication Sig Dispense Refill    apixaban (ELIQUIS) 5 MG TABS tablet Take 5 mg by mouth 2 times daily Indications: Deep Vein Thrombosis of Lower Extremity      cephALEXin (KEFLEX) 500 MG capsule Take 1 capsule by mouth daily .   Take 1st capsule the night of surgery at 9PM.  Take 2nd capsule the morning after surgery at 6AM. 2 capsule 0    spironolactone (ALDACTONE) 25 MG tablet Take 0.5 tablets by mouth daily 30 tablet 3    tamsulosin (FLOMAX) 0.4 MG capsule Take 0.4 mg by mouth daily      carvedilol (COREG) 12.5 MG tablet Take 1 tablet by mouth 2 times daily (with meals) 60 tablet 5    ferrous sulfate 325 (65 Fe) MG tablet Take 325 mg by mouth      Cholecalciferol (VITAMIN D) 2000 units CAPS capsule Take by mouth daily      amLODIPine (NORVASC) 10 MG tablet Take 10 mg by mouth daily. No current facility-administered medications for this visit.        Past Medical History:   Diagnosis Date    Anesthesia     high blood pressure after surgery    Cancer ([address]     COLON    DVT (deep venous thrombosis) (HCC)     GERD without esophagitis 5/2/2019    Hypertension     Hypertension, essential 5/2/2019    Obstructive sleep apnea (adult) (pediatric)         Past Surgical History:   Procedure Laterality Date    CATARACT REMOVAL WITH IMPLANT  08/14/2012    phaco left eye/ IOL    CATARACT REMOVAL WITH IMPLANT  8/28/2012    Right     COLONOSCOPY      HERNIA REPAIR       TERI    SHOULDER SURGERY      LEFT    SHOULDER SURGERY  12-26-13    BILATERAL LEVATOR ADVANCEMENT, EXCISION OF MULTIPLE NEVIS ON    SMALL INTESTINE SURGERY      TOTAL HIP ARTHROPLASTY Left 4/12/2022    LEFT TOTAL HIP REPLACEMENT DIRECT ANTERIOR - DEPUY ADVANCED performed by Duyen Broderick MD at [address]  04/19/2017    UPPER GASTROINTESTINAL ENDOSCOPY  06/06/2018    EUS, gastric nodule biopsy       Family History   Problem Relation Age of Onset    High Blood Pressure Mother        Social History     Socioeconomic History    Marital status:      Spouse name: Not on file    Number of children: Not on file    Years of education: Not on file    Highest education level: Not on file   Occupational History    Not on file   Tobacco Use    Smoking status: Never Smoker    Smokeless tobacco: Never Used   Vaping Use    Vaping Use: Never used   Substance and Sexual Activity    Alcohol use: No    Drug use: No    Sexual activity: Not on file   Other Topics Concern    Not on file   Social History Narrative    Not on file     Social Determinants of Health     Financial Resource Strain:     Difficulty of Paying Living Expenses: Not on file   Food Insecurity:     Worried About Running Out of Food in the Last Year: Not on file    [place] of Food in the Last Year: Not on file   Transportation Needs:     Lack of Transportation (Medical): Not on file    Lack of Transportation (Non-Medical): Not on file   Physical Activity:     Days of Exercise per Week: Not on file    Minutes of Exercise per Session: Not on file   Stress:     Feeling of Stress : Not on file   Social Connections:     Frequency of Communication with Friends and Family: Not on file    Frequency of Social Gatherings with Friends and Family: Not on file    Attends Jehovah's witness Services: Not on file    Active Member of [address] [place] [address] [employer] or Organizations: Not on file    Attends Club or Organization Meetings: Not on file    Marital Status: Not on file   Intimate Partner Violence:     Fear of Current or Ex-Partner: Not on file    Emotionally Abused: Not on file    Physically Abused: Not on file    Sexually Abused: Not on file   Housing Stability:     Unable to Pay for Housing in the Last Year: Not on file    Number of [place] in the Last Year: Not on file    Unstable Housing in the Last Year: Not on file        Vitals:    06/03/22 0950   Weight: 211 lb (95.7 kg)   Height: 5' 9\" (1.753 m)       Physical Exam  Constitutional  well-groomed, well-nourished, Body mass index is 31.16 kg/m².     Here with his wife  Psychiatric  very pleasant, normal mood & affect  Cardiovascular  RRR, negative peripheral edema, left posterior tibialis pulse 1+  Respiratory  respirations unlabored, on room air; mask on  Skin  no rashes, wounds, or lesions seen on exposed skin  Neurological - LLE SILT SP/DP/T/LFC nerve distributions; EHL/FHL/TA/GS/quad intact  Left hip -    Surgical incision is nicely healed   No erythema or drainage   Steri-Strips are intact   Appropriate amount of postop swelling is options. Informed patient viscosupplementation can be performed every 6 months as needed. 8)  Bracing and cane use can improve pain and function. Although not specifically listed in the CPGs, ice therapy and topical patches such as Salonpas are good options as well.     Intra-articular knee injection:  Will submit for viscosupplementation insurance approval.      Jane Campbell MD

## 2022-07-06 ENCOUNTER — OFFICE VISIT (OUTPATIENT)
Dept: ORTHOPEDIC SURGERY | Age: 72
End: 2022-07-06

## 2022-07-06 VITALS — HEIGHT: 69 IN | BODY MASS INDEX: 31.25 KG/M2 | WEIGHT: 211 LBS

## 2022-07-06 DIAGNOSIS — Z96.642 STATUS POST TOTAL HIP REPLACEMENT, LEFT: Primary | ICD-10-CM

## 2022-07-06 PROCEDURE — 99024 POSTOP FOLLOW-UP VISIT: CPT | Performed by: ORTHOPAEDIC SURGERY

## 2022-07-06 NOTE — PROGRESS NOTES
ORTHOPAEDIC CONSULTATION NOTE    Chief Complaint   Patient presents with    Post-Op Check     4/12/22 Lt SERENITY       Knee Pain     7/6/22  Laverne Daniels returns to clinic today for routine follow-up after his left total hip arthroplasty  He is just short of 3 months postop  He reports his left hip is doing great  No issues, no pain  He is finished physical therapy  He is not requiring a cane or walker anymore  Insurance denied knee viscosupplementation      6/3/22  Laverne aDniels returns to clinic today for assessment of new problems: Bilateral knee pain  Left worse than right  Chronic in nature  Worse anteriorly and medially  Saw Dr. Sharon Shepherd for his knees previously and received viscosupplementation last year with good relief  He is interested in possibly repeating those shots  Reports his left hip is doing great      4/27/22  First postop check after left total hip arthroplasty  He reports he is doing well  Pain rated 1-2 out of 10  No incisional problems, no numbness or tingling  Doing outpatient physical therapy already, using single crutch for ambulation      4/12/22  OPERATION PERFORMED:  Left total hip arthroplasty, direct anterior.       1/19/22  73 y.o. male seen in consultation at the request of Jeremiah Baer MD for evaluation of left hip pain, discuss SERENITY:  Onset years  Injury/trauma none  History of symptoms as above  Pain is located lateral hip and groin   No distal radiation  Worse with activity, pivoting, sitting, working out  Better with rest   Previously saw Dr Sharon Shepherd, had left hip steroid injection which helped for few months, but symptoms have returned  Associated with stiffness  Denies subjective LLD  Numbness and/or tingling = no        No Known Allergies     Current Outpatient Medications   Medication Sig Dispense Refill    apixaban (ELIQUIS) 5 MG TABS tablet Take 5 mg by mouth 2 times daily Indications: Deep Vein Thrombosis of Lower Extremity      cephALEXin (KEFLEX) 500 MG capsule Take 1 capsule by mouth daily . Take 1st capsule the night of surgery at 9PM.  Take 2nd capsule the morning after surgery at 6AM. 2 capsule 0    spironolactone (ALDACTONE) 25 MG tablet Take 0.5 tablets by mouth daily 30 tablet 3    tamsulosin (FLOMAX) 0.4 MG capsule Take 0.4 mg by mouth daily      carvedilol (COREG) 12.5 MG tablet Take 1 tablet by mouth 2 times daily (with meals) 60 tablet 5    ferrous sulfate 325 (65 Fe) MG tablet Take 325 mg by mouth      Cholecalciferol (VITAMIN D) 2000 units CAPS capsule Take by mouth daily      amLODIPine (NORVASC) 10 MG tablet Take 10 mg by mouth daily. No current facility-administered medications for this visit.        Past Medical History:   Diagnosis Date    Anesthesia     high blood pressure after surgery    Cancer ([facility] [address]     COLON    DVT (deep venous thrombosis) (HCC)     GERD without esophagitis 5/2/2019    Hypertension     Hypertension, essential 5/2/2019    Obstructive sleep apnea (adult) (pediatric)         Past Surgical History:   Procedure Laterality Date    CATARACT REMOVAL WITH IMPLANT  08/14/2012    phaco left eye/ IOL    CATARACT REMOVAL WITH IMPLANT  8/28/2012    Right     COLONOSCOPY      HERNIA REPAIR       TERI    SHOULDER SURGERY      LEFT    SHOULDER SURGERY  12-26-13    BILATERAL LEVATOR ADVANCEMENT, EXCISION OF MULTIPLE NEVIS ON    SMALL INTESTINE SURGERY      TOTAL HIP ARTHROPLASTY Left 4/12/2022    LEFT TOTAL HIP REPLACEMENT DIRECT ANTERIOR - DEPUY ADVANCED performed by Norman Howard MD at [address]  04/19/2017    UPPER GASTROINTESTINAL ENDOSCOPY  06/06/2018    EUS, gastric nodule biopsy       Family History   Problem Relation Age of Onset    High Blood Pressure Mother        Social History     Socioeconomic History    Marital status:      Spouse name: Not on file    Number of children: Not on file    Years of education: Not on file    Highest education level: Not on file   Occupational History  Not on file   Tobacco Use    Smoking status: Never Smoker    Smokeless tobacco: Never Used   Vaping Use    Vaping Use: Never used   Substance and Sexual Activity    Alcohol use: No    Drug use: No    Sexual activity: Not on file   Other Topics Concern    Not on file   Social History Narrative    Not on file     Social Determinants of Health     Financial Resource Strain:     Difficulty of Paying Living Expenses: Not on file   Food Insecurity:     Worried About Running Out of Food in the Last Year: Not on file    Michaelle of Food in the Last Year: Not on file   Transportation Needs:     Lack of Transportation (Medical): Not on file    Lack of Transportation (Non-Medical): Not on file   Physical Activity:     Days of Exercise per Week: Not on file    Minutes of Exercise per Session: Not on file   Stress:     Feeling of Stress : Not on file   Social Connections:     Frequency of Communication with Friends and Family: Not on file    Frequency of Social Gatherings with Friends and Family: Not on file    Attends Anabaptism Services: Not on file    Active Member of [address] [place] [address] or Organizations: Not on file    Attends Club or Organization Meetings: Not on file    Marital Status: Not on file   Intimate Partner Violence:     Fear of Current or Ex-Partner: Not on file    Emotionally Abused: Not on file    Physically Abused: Not on file    Sexually Abused: Not on file   Housing Stability:     Unable to Pay for Housing in the Last Year: Not on file    Number of [place] in the Last Year: Not on file    Unstable Housing in the Last Year: Not on file        Vitals:    07/06/22 0909   Weight: 211 lb (95.7 kg)   Height: 5' 9\" (1.753 m)       Physical Exam  Constitutional  well-groomed, well-nourished, Body mass index is 31.16 kg/m².    Psychiatric  very pleasant, normal mood & affect  Cardiovascular  RRR, negative peripheral edema, left posterior tibialis pulse 1+  Skin  no rashes, wounds, or lesions seen on exposed skin  Neurological - LLE SILT SP/DP/T/LFC nerve distributions; EHL/FHL/TA/GS/quad intact  Left hip -    Surgical incision is nicely healed   No erythema or drainage   No swelling or ecchymosis  Normal gait        Imaging:  Prior images reviewed today   Bilateral knees 4 views performed 6/3/22 -   Overall alignment is mild varus. The medial compartment articular height is moderately to severely narrowed with small osteophytes seen. The lateral compartment articular height is mildly to moderately narrowed. The anterior compartment articular height is mildly narrowed with large osteophytes seen. Enthesophytes involving the patella. AP pelvis and Left hip 2 views performed 4/27/22 - s/p uncemented total hip arthroplasty with implants in stable position. No hardware complications, no fractures. Assessment & Plan:  73 y.o. male who presents with    Diagnosis Orders   1. Status post total hip replacement, left         No orders of the defined types were placed in this encounter.       Left hip is doing well  Activities as tolerated, no restrictions  Follow-up annually, sooner if any issues arise    Elena Shaffer MD

## 2022-07-25 RX ORDER — SPIRONOLACTONE 25 MG/1
12.5 TABLET ORAL DAILY
Qty: 45 TABLET | Refills: 3 | Status: SHIPPED | OUTPATIENT
Start: 2022-07-25 | End: 2022-08-10 | Stop reason: SDUPTHER

## 2022-07-25 NOTE — TELEPHONE ENCOUNTER
Requested Prescriptions     Pending Prescriptions Disp Refills    spironolactone (ALDACTONE) 25 MG tablet [Pharmacy Med Name: HYDROCHLOROTHIAZIDE 12.5MG CAPSULES] 45 tablet 3     Sig: Take 0.5 tablets by mouth in the morning.             Last Office Visit: 2/10/2022     Next office visit : 09/13/2022

## 2022-08-04 RX ORDER — CARVEDILOL 12.5 MG/1
TABLET ORAL
Qty: 180 TABLET | Refills: 3 | Status: SHIPPED | OUTPATIENT
Start: 2022-08-04 | End: 2022-08-29

## 2022-08-09 NOTE — PROGRESS NOTES
05/11/20    Electronically signed by NELA Ritchie CNP on 5/11/2020 at 8:55 AM [facility]:  I discussed with the patient the risks of ustekinumab including but not limited to immunosuppression, malignancy, posterior leukoencephalopathy syndrome, and serious infections.  The patient understands that monitoring is required including a PPD at baseline and must alert us or the primary physician if symptoms of infection or other concerning signs are noted.

## 2022-08-10 ENCOUNTER — NURSE ONLY (OUTPATIENT)
Dept: CARDIOLOGY CLINIC | Age: 72
End: 2022-08-10

## 2022-08-10 ENCOUNTER — OFFICE VISIT (OUTPATIENT)
Dept: CARDIOLOGY CLINIC | Age: 72
End: 2022-08-10
Payer: MEDICARE

## 2022-08-10 VITALS
HEART RATE: 60 BPM | DIASTOLIC BLOOD PRESSURE: 66 MMHG | BODY MASS INDEX: 31.22 KG/M2 | SYSTOLIC BLOOD PRESSURE: 130 MMHG | WEIGHT: 211.4 LBS

## 2022-08-10 DIAGNOSIS — R00.2 PALPITATIONS: Primary | ICD-10-CM

## 2022-08-10 PROCEDURE — 93000 ELECTROCARDIOGRAM COMPLETE: CPT | Performed by: NURSE PRACTITIONER

## 2022-08-10 PROCEDURE — 93246 EXT ECG>7D<15D RECORDING: CPT | Performed by: INTERNAL MEDICINE

## 2022-08-10 PROCEDURE — 1123F ACP DISCUSS/DSCN MKR DOCD: CPT | Performed by: NURSE PRACTITIONER

## 2022-08-10 PROCEDURE — 99214 OFFICE O/P EST MOD 30 MIN: CPT | Performed by: NURSE PRACTITIONER

## 2022-08-10 RX ORDER — ATORVASTATIN CALCIUM 10 MG/1
10 TABLET, FILM COATED ORAL DAILY
Qty: 90 TABLET | Refills: 3 | Status: SHIPPED | OUTPATIENT
Start: 2022-08-10

## 2022-08-10 RX ORDER — SPIRONOLACTONE 25 MG/1
25 TABLET ORAL DAILY
Qty: 90 TABLET | Refills: 1 | Status: SHIPPED | OUTPATIENT
Start: 2022-08-10

## 2022-08-10 NOTE — PROGRESS NOTES
[address]     Outpatient visit with c/o abnormal monitor strip during colonoscopy     Interval hx: VSS c/o fatigue no c/o dizziness syncope or presyncope    complaint with meds   Discussed nutrition / sodium intake / fluid intake   Recommend activity as tolerated   Will do EKG and cam for 2 weeks     complaint with meds   Discussed nutrition / sodium intake / fluid intake   Recommend activity as tolerated     CHIEF COMPLAINT / HPI:  Libertad Desai is 73 y.o. male who presents today with a history of HTN optimal   Hx of EKG abnormal 1st degree av block /stable   Hx syncope with drinking much alcohol that day / has not has any c/o any presyncope or syncope  states was drinking too much & stopped drinking / feels much better   HLD optimal  GERD stable     Neg stress test 2019   EKG today NSR 1st degee av block similar to last EKG   No c/o cp/sOB/edmea/PND    Has MC wears CPAP   states had Covid in Sept 21   No Covid vaccines per pt request     TTE 7/15/2021 Left ventricular cavity size is normal with moderate concentric left ventricular hypertrophy. Overall, left ventricular systolic function appears normal.  Ejection fraction is visually estimated to be 60-65%. No regional wall motion abnormalities are noted. Diastolic filling parameters suggests grade II diastolic dysfunction. Trivial mitral regurgitation. Mild tricuspid regurgitation with a PASP of 25 mmHg. Past Medical History:   Diagnosis Date    Anesthesia     high blood pressure after surgery    Cancer ([address]     COLON    DVT (deep venous thrombosis) (HCC)     GERD without esophagitis 5/2/2019    Hypertension     Hypertension, essential 5/2/2019    Obstructive sleep apnea (adult) (pediatric)        Social History     Tobacco Use   Smoking Status Never   Smokeless Tobacco Never     Current Medications:      REVIEW OF SYSTEMS:    CONSTITUTIONAL: No major weight gain or loss, night sweats, fever, fatigue, or weakness.   HEENT: No new vision difficulties or ringing in the ears. RESPIRATORY: No new SOB, PND, orthopnea or cough. CARDIOVASCULAR: See HPI  GI: No N/V/D, constipation, or abdominal pain. No black/tarry stools  : No urinary urgency, incontinence, or hematuria. SKIN: No cyanosis or skin lesions. MUSCULOSKELETAL: No new muscle or joint pain. NEUROLOGICAL: No syncope or TIA-like symptoms. PSYCHIATRIC: No anxiety, pain, insomnia or depression        Vitals:    08/10/22 1601   BP: 130/66   Pulse: 60   Weight: 211 lb 6.4 oz (95.9 kg)      VITALS:  /66   Pulse 60   Wt 211 lb 6.4 oz (95.9 kg)   BMI 31.22 kg/m²   CONSTITUTIONAL: Cooperative, no apparent distress, and appears well nourished / developed  NEUROLOGIC:  Awake and orientated to person, place, and time. PSYCH: Calm affect. SKIN: Warm and dry. HEENT: Sclera non-icteric, normocephalic, neck supple. RESPIRATORY:  No increased work of breathing and clear to auscultation, no crackles or wheezing. CARDIOVASCULAR:  Regular rate and rhythm   No elevation of JVP. Normal carotid pulses with no bruits. GI:  Normal bowel sounds. Non-distended. Non-tender to palpation  EXT: No edema. No calf tenderness. Pulses are present bilaterally.     DATA:    Lab Results   Component Value Date    ALT 14 03/21/2022    AST 18 03/21/2022    ALKPHOS 112 03/21/2022    BILITOT 0.5 03/21/2022     Lab Results   Component Value Date    CREATININE 1.4 (H) 03/21/2022    BUN 16 03/21/2022     03/21/2022    K 3.3 (L) 03/21/2022     03/21/2022    CO2 25 03/21/2022     Lab Results   Component Value Date    TSH 1.450 03/29/2019     Lab Results   Component Value Date    WBC 7.5 03/21/2022    HGB 13.1 (L) 03/21/2022    HCT 39.0 (L) 03/21/2022    MCV 85.6 03/21/2022     03/21/2022       Lab Results   Component Value Date    TRIG 96 02/17/2022    TRIG 78 12/05/2019     Lab Results   Component Value Date    HDL 50 02/17/2022    HDL 61 (H) 12/05/2019     Lab Results   Component Value Date LDLCALC 125 (H) 02/17/2022    LDLCALC 120 (H) 12/05/2019     Lab Results   Component Value Date    LABVLDL 19 02/17/2022    LABVLDL 16 12/05/2019   Radiology Review:  Pertinent images / reports were reviewed as a part of this visit and reveals the following:    Last EKG Echo:Last Stress Test:Last Angiogram:reviewed     Assessment: Hx bradycardia   no c/o cp SOB only c/o fatigue   denies presyncope or syncope      MC  uses CPAP now & states feels better / no c/o cp SOB edema / pleasant / active       HTN   stress nuclear study showing ejection fraction of 72% and was negative for ischemia. Hx syncope with drinking much alcohol / has not has any c/o any presyncope or syncope  states was drinking too much & stopped drinking / feels much better     CRI  sCr 1.9 >> 1.7  Followed by Marisela SHETTY      Start lipitor 10mg po daily    discussed diet & activity      GERD  Stable      Plan:  Start lipitor 10mg daily  EKG today   Cam for 2 weeks   Blood work   Fu in 4 weeks   Thank you for allowing us to participate [facility]     [address]     Documentation of today's visit sent to PCP

## 2022-08-25 ENCOUNTER — HOSPITAL ENCOUNTER (OUTPATIENT)
Age: 72
Discharge: HOME | End: 2022-08-25
Payer: MEDICARE

## 2022-08-25 DIAGNOSIS — R00.2 PALPITATIONS: ICD-10-CM

## 2022-08-25 LAB
A/G RATIO: 1.1 (ref 1.1–2.2)
ALBUMIN SERPL-MCNC: 3.9 G/DL (ref 3.4–5)
ALP BLD-CCNC: 94 U/L (ref 40–129)
ALT SERPL-CCNC: 16 U/L (ref 10–40)
ANION GAP SERPL CALCULATED.3IONS-SCNC: 11 MMOL/L (ref 3–16)
AST SERPL-CCNC: 18 U/L (ref 15–37)
BILIRUB SERPL-MCNC: 0.4 MG/DL (ref 0–1)
BILIRUBIN DIRECT: <0.2 MG/DL (ref 0–0.3)
BILIRUBIN, INDIRECT: NORMAL MG/DL (ref 0–1)
BUN BLDV-MCNC: 22 MG/DL (ref 7–20)
CALCIUM SERPL-MCNC: 9.1 MG/DL (ref 8.3–10.6)
CHLORIDE BLD-SCNC: 106 MMOL/L (ref 99–110)
CHOLESTEROL, TOTAL: 159 MG/DL (ref 0–199)
CO2: 24 MMOL/L (ref 21–32)
CREAT SERPL-MCNC: 1.5 MG/DL (ref 0.8–1.3)
GFR AFRICAN AMERICAN: 56
GFR NON-AFRICAN AMERICAN: 46
GLUCOSE BLD-MCNC: 88 MG/DL (ref 70–99)
HCT VFR BLD CALC: 38.1 % (ref 40.5–52.5)
HDLC SERPL-MCNC: 47 MG/DL (ref 40–60)
HEMOGLOBIN: 12.9 G/DL (ref 13.5–17.5)
LDL CHOLESTEROL CALCULATED: 97 MG/DL
MAGNESIUM: 2.1 MG/DL (ref 1.8–2.4)
MCH RBC QN AUTO: 28.7 PG (ref 26–34)
MCHC RBC AUTO-ENTMCNC: 33.8 G/DL (ref 31–36)
MCV RBC AUTO: 84.8 FL (ref 80–100)
PDW BLD-RTO: 13.9 % (ref 12.4–15.4)
PLATELET # BLD: 192 K/UL (ref 135–450)
PMV BLD AUTO: 9.1 FL (ref 5–10.5)
POTASSIUM SERPL-SCNC: 4.4 MMOL/L (ref 3.5–5.1)
RBC # BLD: 4.49 M/UL (ref 4.2–5.9)
SODIUM BLD-SCNC: 141 MMOL/L (ref 136–145)
TOTAL PROTEIN: 7.4 G/DL (ref 6.4–8.2)
TRIGL SERPL-MCNC: 76 MG/DL (ref 0–150)
TSH REFLEX FT4: 0.67 UIU/ML (ref 0.27–4.2)
VITAMIN D 25-HYDROXY: 46.9 NG/ML
VLDLC SERPL CALC-MCNC: 15 MG/DL
WBC # BLD: 10.2 K/UL (ref 4–11)

## 2022-08-25 PROCEDURE — 82248 BILIRUBIN DIRECT: CPT

## 2022-08-25 PROCEDURE — 82306 VITAMIN D 25 HYDROXY: CPT

## 2022-08-25 PROCEDURE — 36415 COLL VENOUS BLD VENIPUNCTURE: CPT

## 2022-08-25 PROCEDURE — 85027 COMPLETE CBC AUTOMATED: CPT

## 2022-08-25 PROCEDURE — 84443 ASSAY THYROID STIM HORMONE: CPT

## 2022-08-25 PROCEDURE — 83735 ASSAY OF MAGNESIUM: CPT

## 2022-08-25 PROCEDURE — 80053 COMPREHEN METABOLIC PANEL: CPT

## 2022-08-25 PROCEDURE — 80061 LIPID PANEL: CPT

## 2022-08-29 ENCOUNTER — HOSPITAL ENCOUNTER (INPATIENT)
Age: 72
LOS: 2 days | Discharge: HOME | DRG: 243 | End: 2022-08-31
Attending: INTERNAL MEDICINE | Admitting: INTERNAL MEDICINE
Payer: MEDICARE

## 2022-08-29 ENCOUNTER — TELEPHONE (OUTPATIENT)
Dept: CARDIOLOGY CLINIC | Age: 72
End: 2022-08-29

## 2022-08-29 ENCOUNTER — OFFICE VISIT (OUTPATIENT)
Dept: CARDIOLOGY CLINIC | Age: 72
End: 2022-08-29
Payer: MEDICARE

## 2022-08-29 VITALS
WEIGHT: 214 LBS | DIASTOLIC BLOOD PRESSURE: 60 MMHG | HEIGHT: 69 IN | BODY MASS INDEX: 31.7 KG/M2 | SYSTOLIC BLOOD PRESSURE: 130 MMHG | HEART RATE: 46 BPM

## 2022-08-29 DIAGNOSIS — I44.2 CHB (COMPLETE HEART BLOCK) (HCC): ICD-10-CM

## 2022-08-29 DIAGNOSIS — R94.31 ABNORMAL EKG: Primary | ICD-10-CM

## 2022-08-29 PROCEDURE — 6360000002 HC RX W HCPCS: Performed by: INTERNAL MEDICINE

## 2022-08-29 PROCEDURE — 1123F ACP DISCUSS/DSCN MKR DOCD: CPT | Performed by: NURSE PRACTITIONER

## 2022-08-29 PROCEDURE — 2580000003 HC RX 258: Performed by: INTERNAL MEDICINE

## 2022-08-29 PROCEDURE — 99214 OFFICE O/P EST MOD 30 MIN: CPT | Performed by: NURSE PRACTITIONER

## 2022-08-29 PROCEDURE — 2060000000 HC ICU INTERMEDIATE R&B

## 2022-08-29 PROCEDURE — 93005 ELECTROCARDIOGRAM TRACING: CPT | Performed by: INTERNAL MEDICINE

## 2022-08-29 PROCEDURE — 93000 ELECTROCARDIOGRAM COMPLETE: CPT | Performed by: NURSE PRACTITIONER

## 2022-08-29 RX ORDER — ACETAMINOPHEN 160 MG
TABLET,DISINTEGRATING ORAL DAILY
COMMUNITY

## 2022-08-29 RX ORDER — ACETAMINOPHEN 650 MG/1
650 SUPPOSITORY RECTAL EVERY 6 HOURS PRN
Status: DISCONTINUED | OUTPATIENT
Start: 2022-08-29 | End: 2022-08-31 | Stop reason: HOSPADM

## 2022-08-29 RX ORDER — ENOXAPARIN SODIUM 100 MG/ML
40 INJECTION SUBCUTANEOUS DAILY
Status: DISCONTINUED | OUTPATIENT
Start: 2022-08-29 | End: 2022-08-31 | Stop reason: HOSPADM

## 2022-08-29 RX ORDER — POLYETHYLENE GLYCOL 3350 17 G/17G
17 POWDER, FOR SOLUTION ORAL DAILY PRN
Status: DISCONTINUED | OUTPATIENT
Start: 2022-08-29 | End: 2022-08-31 | Stop reason: HOSPADM

## 2022-08-29 RX ORDER — SODIUM CHLORIDE 0.9 % (FLUSH) 0.9 %
5-40 SYRINGE (ML) INJECTION EVERY 12 HOURS SCHEDULED
Status: DISCONTINUED | OUTPATIENT
Start: 2022-08-29 | End: 2022-08-31 | Stop reason: HOSPADM

## 2022-08-29 RX ORDER — SODIUM CHLORIDE 0.9 % (FLUSH) 0.9 %
5-40 SYRINGE (ML) INJECTION PRN
Status: DISCONTINUED | OUTPATIENT
Start: 2022-08-29 | End: 2022-08-31 | Stop reason: HOSPADM

## 2022-08-29 RX ORDER — ONDANSETRON 2 MG/ML
4 INJECTION INTRAMUSCULAR; INTRAVENOUS EVERY 6 HOURS PRN
Status: DISCONTINUED | OUTPATIENT
Start: 2022-08-29 | End: 2022-08-31 | Stop reason: HOSPADM

## 2022-08-29 RX ORDER — SODIUM CHLORIDE 9 MG/ML
INJECTION, SOLUTION INTRAVENOUS PRN
Status: DISCONTINUED | OUTPATIENT
Start: 2022-08-29 | End: 2022-08-31 | Stop reason: HOSPADM

## 2022-08-29 RX ORDER — ONDANSETRON 4 MG/1
4 TABLET, ORALLY DISINTEGRATING ORAL EVERY 8 HOURS PRN
Status: DISCONTINUED | OUTPATIENT
Start: 2022-08-29 | End: 2022-08-31 | Stop reason: HOSPADM

## 2022-08-29 RX ORDER — ACETAMINOPHEN 325 MG/1
650 TABLET ORAL EVERY 6 HOURS PRN
Status: DISCONTINUED | OUTPATIENT
Start: 2022-08-29 | End: 2022-08-31 | Stop reason: HOSPADM

## 2022-08-29 RX ADMIN — ENOXAPARIN SODIUM 40 MG: 100 INJECTION SUBCUTANEOUS at 20:26

## 2022-08-29 RX ADMIN — Medication 10 ML: at 21:27

## 2022-08-29 ASSESSMENT — PAIN SCALES - GENERAL: PAINLEVEL_OUTOF10: 0

## 2022-08-29 NOTE — TELEPHONE ENCOUNTER
Blood Pressure Problems:      1) When was the last time you took your blood pressure? 22        2) What was your blood pressure readin/82 HR 32    3) Do you have your blood pressure readings written down within the last week? Not recorded patient is reporting a low HR     4) What symptoms are you experiencing? Patient is Asymptomatic       5) How long have had these symptoms? The patient just get fatigue at times     6) What medications are you taking for your BP? amLODIPine (NORVASC) 10 MG tablet [[id number]]     Order Details  Dose: 10 mg Route: Oral Frequency: DAILY   Dispense Quantity: -- Refills: --          Sig: Take 10 mg by mouth daily. spironolactone (ALDACTONE) 25 MG tablet [[id number]]     Order Details  Dose: 25 mg Route: Oral Frequency: DAILY   Dispense Quantity: 90 tablet Refills: 1          Sig: Take 1 tablet by mouth in the morning. 7) Have you had any recent dietary or medication changes? No     Please call the patient spouse Aleena Richards back at [phone] to advise.

## 2022-08-29 NOTE — TELEPHONE ENCOUNTER
Called and scheduled patient an appointment with Lisa Ramos today August 29,2022 to check out why his heart rate is running low.

## 2022-08-29 NOTE — PROGRESS NOTES
[address]     Outpatient visit with c/o abnormal monitor strip during colonoscopy   On 8/10/2022  VSS c/o fatigue no c/o dizziness syncope or presyncope    Will do EKG and cam for 2 weeks   Today c/o dizziness lightheaded that wax and wanes at times no presyncope or syncope   b/p wnl HR 48 / no edema no c/o cp SOB has MC uses CPAP   EKG HR 46 with appears to be 2:1 AV block   Stop coreg     CHIEF COMPLAINT / HPI:  Andrea Fulton is 73 y.o. male who presents today with a history of HTN optimal   Hx of EKG abnormal 1st degree av block /stable   Hx syncope with drinking much alcohol that day / has not has any c/o any presyncope or syncope  states was drinking too much & stopped drinking / feels much better   HLD optimal  CRI 1.4> 1.5 follows Dr Linda Galvez   GERD stable     Neg stress test 2019   Has MC wears CPAP   states had Covid in Sept 21   No Covid vaccines per pt request     TTE 7/15/2021 Left ventricular cavity size is normal with moderate concentric left ventricular hypertrophy. Overall, left ventricular systolic function appears normal.Ejection fraction is visually estimated to be 60-65%. No regional wall motion abnormalities are noted. Diastolic filling parameters suggests grade II diastolic dysfunction. Trivial mitral regurgitation. Mild tricuspid regurgitation with a PASP of 25 mmHg. Past Medical History:   Diagnosis Date    Anesthesia     high blood pressure after surgery    Cancer ([address]     COLON    DVT (deep venous thrombosis) (HCC)     GERD without esophagitis 5/2/2019    Hypertension     Hypertension, essential 5/2/2019    Obstructive sleep apnea (adult) (pediatric)        Social History     Tobacco Use   Smoking Status Never   Smokeless Tobacco Never       REVIEW OF SYSTEMS:    CONSTITUTIONAL: No major weight gain or loss, night sweats, fever, fatigue, or weakness. HEENT: No new vision difficulties or ringing in the ears. RESPIRATORY: No new SOB, PND, orthopnea or cough. CARDIOVASCULAR: See HPI  GI: No N/V/D, constipation, or abdominal pain. No black/tarry stools  : No urinary urgency, incontinence, or hematuria. SKIN: No cyanosis or skin lesions. MUSCULOSKELETAL: No new muscle or joint pain. NEUROLOGICAL: No syncope or TIA-like symptoms. PSYCHIATRIC: No anxiety, pain, insomnia or depression        Vitals:    08/29/22 1027 08/29/22 1041   BP: 130/60    Pulse: (!) 46 (!) 46   Weight: 214 lb (97.1 kg)    Height: 5' 9\" (1.753 m)       VITALS:  /60   Pulse (!) 46   Ht 5' 9\" (1.753 m)   Wt 214 lb (97.1 kg)   BMI 31.60 kg/m²   CONSTITUTIONAL: Cooperative, no apparent distress, and appears well nourished / developed  NEUROLOGIC:  Awake and orientated to person, place, and time. PSYCH: Calm affect. SKIN: Warm and dry. HEENT: Sclera non-icteric, normocephalic, neck supple. RESPIRATORY:  No increased work of breathing and clear to auscultation, no crackles or wheezing. CARDIOVASCULAR:  Regular rate and rhythm   No elevation of JVP. Normal carotid pulses with no bruits. GI:  Normal bowel sounds. Non-distended. Non-tender to palpation  EXT: No edema. No calf tenderness. Pulses are present bilaterally.       Lab Results   Component Value Date    ALT 16 08/25/2022    AST 18 08/25/2022    ALKPHOS 94 08/25/2022    BILITOT 0.4 08/25/2022     Lab Results   Component Value Date    CREATININE 1.5 (H) 08/25/2022    BUN 22 (H) 08/25/2022     08/25/2022    K 4.4 08/25/2022     08/25/2022    CO2 24 08/25/2022     Lab Results   Component Value Date    TSH 1.450 03/29/2019     Lab Results   Component Value Date    WBC 10.2 08/25/2022    HGB 12.9 (L) 08/25/2022    HCT 38.1 (L) 08/25/2022    MCV 84.8 08/25/2022     08/25/2022       Lab Results   Component Value Date    TRIG 76 08/25/2022    TRIG 96 02/17/2022    TRIG 78 12/05/2019     Lab Results   Component Value Date    HDL 47 08/25/2022    HDL 50 02/17/2022    HDL 61 (H) 12/05/2019     Lab Results   Component Value Date    LDLCALC 97 08/25/2022    LDLCALC 125 (H) 02/17/2022    LDLCALC 120 (H) 12/05/2019     Lab Results   Component Value Date    LABVLDL 15 08/25/2022    LABVLDL 19 02/17/2022    LABVLDL 16 12/05/2019   Radiology Review:  Pertinent images / reports were reviewed as a part of this visit and reveals the following:    Last EKG Echo:Last Stress Test:Last Angiogram:reviewed     Assessment: Hx bradycardia  with fatigue with neg stress test and TTE wnl  / hold coreg   no c/o cp SOB only c/o fatigue   denies presyncope or syncope    Cam recorder applied     TTE 7/15/2021 Left ventricular cavity size is normal with moderate concentric left ventricular hypertrophy. Overall, left ventricular systolic function appears normal.  Ejection fraction is visually estimated to be 60-65%. No regional wall motion abnormalities are noted. Diastolic filling parameters suggests grade II diastolic dysfunction. Trivial mitral regurgitation. Mild tricuspid regurgitation with a PASP of 25 mmHg. MC  uses CPAP now & states feels better       HTN   7/2021 stress nuclear study showing ejection fraction of 72% and was negative for ischemia.     Discussed low salt diet        Hx syncope with drinking much alcohol / has not has any c/o any presyncope or syncope  states was drinking too much & stopped drinking / feels much better     CRI  sCr 1.9 >> 1.7  Followed by Marisela       HLD    >>97   Started  lipitor 10mg po daily in  8/10/2022   discussed diet & activity      GERD  Stable      Plan:  Outpatient visit with c/o abnormal monitor strip during colonoscopy   On 8/10/2022  VSS c/o fatigue no c/o dizziness syncope or presyncope    EKG and cam for 2 weeks     Today c/o  fatigue wants to work out but feels tired & dizziness lightheaded that wax & wanes at times no syncope   b/p wnl HR 48 / no edema  / no c/o cp SOB  has MC uses CPAP   EKG HR 46 with appears to be CHB  Stop coreg / hold all neg chronotropics   Cam recorder results

## 2022-08-29 NOTE — PROGRESS NOTES
Pt is admitted to unit as a DA. VSS. Bed in lowest position, call light and belongings within reach. Patient education folder given and reviewed. Safety protocols and unit activities (VS, meds, rounding, etc.) explained to patient/family. White board updated. No further questions or needs stated at this time. Instructed to call with any needs.    Electronically signed by Franck Felix RN on 8/29/2022 at 6:24 PM

## 2022-08-30 ENCOUNTER — APPOINTMENT (OUTPATIENT)
Dept: GENERAL RADIOLOGY | Age: 72
DRG: 243 | End: 2022-08-30
Attending: INTERNAL MEDICINE
Payer: MEDICARE

## 2022-08-30 ENCOUNTER — ANESTHESIA (OUTPATIENT)
Dept: CARDIAC CATH/INVASIVE PROCEDURES | Age: 72
DRG: 243 | End: 2022-08-30
Payer: MEDICARE

## 2022-08-30 ENCOUNTER — APPOINTMENT (OUTPATIENT)
Dept: CARDIAC CATH/INVASIVE PROCEDURES | Age: 72
DRG: 243 | End: 2022-08-30
Attending: INTERNAL MEDICINE
Payer: MEDICARE

## 2022-08-30 ENCOUNTER — NURSE ONLY (OUTPATIENT)
Dept: CARDIOLOGY CLINIC | Age: 72
End: 2022-08-30
Payer: MEDICARE

## 2022-08-30 ENCOUNTER — ANESTHESIA EVENT (OUTPATIENT)
Dept: CARDIAC CATH/INVASIVE PROCEDURES | Age: 72
DRG: 243 | End: 2022-08-30
Payer: MEDICARE

## 2022-08-30 ENCOUNTER — PROCEDURE VISIT (OUTPATIENT)
Dept: CARDIOLOGY CLINIC | Age: 72
End: 2022-08-30

## 2022-08-30 DIAGNOSIS — I44.2 CHB (COMPLETE HEART BLOCK) (HCC): ICD-10-CM

## 2022-08-30 DIAGNOSIS — Z95.0 PACEMAKER: Primary | ICD-10-CM

## 2022-08-30 DIAGNOSIS — R00.1 BRADYCARDIA: ICD-10-CM

## 2022-08-30 DIAGNOSIS — R55 SYNCOPE, UNSPECIFIED SYNCOPE TYPE: ICD-10-CM

## 2022-08-30 LAB
A/G RATIO: 1.4 (ref 1.1–2.2)
ALBUMIN SERPL-MCNC: 3.9 G/DL (ref 3.4–5)
ALP BLD-CCNC: 99 U/L (ref 40–129)
ALT SERPL-CCNC: 40 U/L (ref 10–40)
ANION GAP SERPL CALCULATED.3IONS-SCNC: 11 MMOL/L (ref 3–16)
AST SERPL-CCNC: 26 U/L (ref 15–37)
BASOPHILS ABSOLUTE: 0 K/UL (ref 0–0.2)
BASOPHILS RELATIVE PERCENT: 0.4 %
BILIRUB SERPL-MCNC: 0.7 MG/DL (ref 0–1)
BUN BLDV-MCNC: 22 MG/DL (ref 7–20)
CALCIUM SERPL-MCNC: 8.8 MG/DL (ref 8.3–10.6)
CHLORIDE BLD-SCNC: 106 MMOL/L (ref 99–110)
CO2: 24 MMOL/L (ref 21–32)
CREAT SERPL-MCNC: 1.4 MG/DL (ref 0.8–1.3)
EKG ATRIAL RATE: 43 BPM
EKG ATRIAL RATE: 57 BPM
EKG ATRIAL RATE: 59 BPM
EKG ATRIAL RATE: 60 BPM
EKG ATRIAL RATE: 63 BPM
EKG ATRIAL RATE: 64 BPM
EKG DIAGNOSIS: NORMAL
EKG P AXIS: 56 DEGREES
EKG P AXIS: 56 DEGREES
EKG P AXIS: 57 DEGREES
EKG P AXIS: 58 DEGREES
EKG P AXIS: 60 DEGREES
EKG P AXIS: 62 DEGREES
EKG P-R INTERVAL: 608 MS
EKG Q-T INTERVAL: 408 MS
EKG Q-T INTERVAL: 424 MS
EKG Q-T INTERVAL: 432 MS
EKG Q-T INTERVAL: 474 MS
EKG Q-T INTERVAL: 552 MS
EKG Q-T INTERVAL: 578 MS
EKG QRS DURATION: 100 MS
EKG QRS DURATION: 180 MS
EKG QRS DURATION: 186 MS
EKG QRS DURATION: 90 MS
EKG QRS DURATION: 96 MS
EKG QRS DURATION: 98 MS
EKG QTC CALCULATION (BAZETT): 332 MS
EKG QTC CALCULATION (BAZETT): 368 MS
EKG QTC CALCULATION (BAZETT): 369 MS
EKG QTC CALCULATION (BAZETT): 400 MS
EKG QTC CALCULATION (BAZETT): 493 MS
EKG QTC CALCULATION (BAZETT): 516 MS
EKG R AXIS: 55 DEGREES
EKG R AXIS: 56 DEGREES
EKG R AXIS: 59 DEGREES
EKG R AXIS: 60 DEGREES
EKG R AXIS: 79 DEGREES
EKG R AXIS: 79 DEGREES
EKG T AXIS: -28 DEGREES
EKG T AXIS: -53 DEGREES
EKG T AXIS: -76 DEGREES
EKG T AXIS: 226 DEGREES
EKG T AXIS: 259 DEGREES
EKG T AXIS: 259 DEGREES
EKG VENTRICULAR RATE: 37 BPM
EKG VENTRICULAR RATE: 43 BPM
EKG VENTRICULAR RATE: 44 BPM
EKG VENTRICULAR RATE: 48 BPM
EKG VENTRICULAR RATE: 48 BPM
EKG VENTRICULAR RATE: 49 BPM
EOSINOPHILS ABSOLUTE: 0.2 K/UL (ref 0–0.6)
EOSINOPHILS RELATIVE PERCENT: 2.6 %
GFR AFRICAN AMERICAN: >60
GFR NON-AFRICAN AMERICAN: 50
GLUCOSE BLD-MCNC: 92 MG/DL (ref 70–99)
HCT VFR BLD CALC: 39.4 % (ref 40.5–52.5)
HEMOGLOBIN: 12.7 G/DL (ref 13.5–17.5)
INR BLD: 1.16 (ref 0.87–1.14)
LYMPHOCYTES ABSOLUTE: 1.9 K/UL (ref 1–5.1)
LYMPHOCYTES RELATIVE PERCENT: 24.3 %
MCH RBC QN AUTO: 27.4 PG (ref 26–34)
MCHC RBC AUTO-ENTMCNC: 32.3 G/DL (ref 31–36)
MCV RBC AUTO: 85 FL (ref 80–100)
MONOCYTES ABSOLUTE: 0.6 K/UL (ref 0–1.3)
MONOCYTES RELATIVE PERCENT: 7.4 %
NEUTROPHILS ABSOLUTE: 5.1 K/UL (ref 1.7–7.7)
NEUTROPHILS RELATIVE PERCENT: 65.3 %
PDW BLD-RTO: 14.3 % (ref 12.4–15.4)
PLATELET # BLD: 187 K/UL (ref 135–450)
PMV BLD AUTO: 8.8 FL (ref 5–10.5)
POTASSIUM REFLEX MAGNESIUM: 4.4 MMOL/L (ref 3.5–5.1)
PROTHROMBIN TIME: 14.7 SEC (ref 11.7–14.5)
RBC # BLD: 4.64 M/UL (ref 4.2–5.9)
SODIUM BLD-SCNC: 141 MMOL/L (ref 136–145)
TOTAL PROTEIN: 6.7 G/DL (ref 6.4–8.2)
WBC # BLD: 7.8 K/UL (ref 4–11)

## 2022-08-30 PROCEDURE — 2580000003 HC RX 258: Performed by: NURSE PRACTITIONER

## 2022-08-30 PROCEDURE — 2060000000 HC ICU INTERMEDIATE R&B

## 2022-08-30 PROCEDURE — 33208 INSRT HEART PM ATRIAL & VENT: CPT

## 2022-08-30 PROCEDURE — 36415 COLL VENOUS BLD VENIPUNCTURE: CPT

## 2022-08-30 PROCEDURE — 6360000002 HC RX W HCPCS

## 2022-08-30 PROCEDURE — 80053 COMPREHEN METABOLIC PANEL: CPT

## 2022-08-30 PROCEDURE — 02HK3JZ INSERTION OF PACEMAKER LEAD INTO RIGHT VENTRICLE, PERCUTANEOUS APPROACH: ICD-10-PCS | Performed by: INTERNAL MEDICINE

## 2022-08-30 PROCEDURE — 85610 PROTHROMBIN TIME: CPT

## 2022-08-30 PROCEDURE — 6360000002 HC RX W HCPCS: Performed by: NURSE PRACTITIONER

## 2022-08-30 PROCEDURE — 99233 SBSQ HOSP IP/OBS HIGH 50: CPT | Performed by: INTERNAL MEDICINE

## 2022-08-30 PROCEDURE — 99223 1ST HOSP IP/OBS HIGH 75: CPT | Performed by: INTERNAL MEDICINE

## 2022-08-30 PROCEDURE — 6370000000 HC RX 637 (ALT 250 FOR IP): Performed by: NURSE PRACTITIONER

## 2022-08-30 PROCEDURE — C1898 LEAD, PMKR, OTHER THAN TRANS: HCPCS

## 2022-08-30 PROCEDURE — 93280 PM DEVICE PROGR EVAL DUAL: CPT | Performed by: INTERNAL MEDICINE

## 2022-08-30 PROCEDURE — 71045 X-RAY EXAM CHEST 1 VIEW: CPT

## 2022-08-30 PROCEDURE — 3700000000 HC ANESTHESIA ATTENDED CARE

## 2022-08-30 PROCEDURE — C1785 PMKR, DUAL, RATE-RESP: HCPCS

## 2022-08-30 PROCEDURE — 02H63JZ INSERTION OF PACEMAKER LEAD INTO RIGHT ATRIUM, PERCUTANEOUS APPROACH: ICD-10-PCS | Performed by: INTERNAL MEDICINE

## 2022-08-30 PROCEDURE — 33208 INSRT HEART PM ATRIAL & VENT: CPT | Performed by: INTERNAL MEDICINE

## 2022-08-30 PROCEDURE — 0JH606Z INSERTION OF PACEMAKER, DUAL CHAMBER INTO CHEST SUBCUTANEOUS TISSUE AND FASCIA, OPEN APPROACH: ICD-10-PCS | Performed by: INTERNAL MEDICINE

## 2022-08-30 PROCEDURE — 2500000003 HC RX 250 WO HCPCS

## 2022-08-30 PROCEDURE — 3700000001 HC ADD 15 MINUTES (ANESTHESIA)

## 2022-08-30 PROCEDURE — 2709999900 HC NON-CHARGEABLE SUPPLY

## 2022-08-30 PROCEDURE — 85025 COMPLETE CBC W/AUTO DIFF WBC: CPT

## 2022-08-30 PROCEDURE — 93010 ELECTROCARDIOGRAM REPORT: CPT | Performed by: INTERNAL MEDICINE

## 2022-08-30 PROCEDURE — C1894 INTRO/SHEATH, NON-LASER: HCPCS

## 2022-08-30 RX ORDER — SODIUM CHLORIDE 0.9 % (FLUSH) 0.9 %
5-40 SYRINGE (ML) INJECTION EVERY 12 HOURS SCHEDULED
Status: DISCONTINUED | OUTPATIENT
Start: 2022-08-30 | End: 2022-08-31 | Stop reason: HOSPADM

## 2022-08-30 RX ORDER — SODIUM CHLORIDE 9 MG/ML
INJECTION, SOLUTION INTRAVENOUS PRN
Status: DISCONTINUED | OUTPATIENT
Start: 2022-08-30 | End: 2022-08-31 | Stop reason: HOSPADM

## 2022-08-30 RX ORDER — PROPOFOL 10 MG/ML
INJECTION, EMULSION INTRAVENOUS PRN
Status: DISCONTINUED | OUTPATIENT
Start: 2022-08-30 | End: 2022-08-30 | Stop reason: SDUPTHER

## 2022-08-30 RX ORDER — SODIUM CHLORIDE 9 MG/ML
25 INJECTION, SOLUTION INTRAVENOUS PRN
Status: CANCELLED | OUTPATIENT
Start: 2022-08-30

## 2022-08-30 RX ORDER — SODIUM CHLORIDE 0.9 % (FLUSH) 0.9 %
5-40 SYRINGE (ML) INJECTION EVERY 12 HOURS SCHEDULED
Status: CANCELLED | OUTPATIENT
Start: 2022-08-30

## 2022-08-30 RX ORDER — LORAZEPAM 2 MG/ML
0.5 INJECTION INTRAMUSCULAR
Status: CANCELLED | OUTPATIENT
Start: 2022-08-30 | End: 2022-08-30

## 2022-08-30 RX ORDER — LIDOCAINE HYDROCHLORIDE 20 MG/ML
INJECTION, SOLUTION EPIDURAL; INFILTRATION; INTRACAUDAL; PERINEURAL PRN
Status: DISCONTINUED | OUTPATIENT
Start: 2022-08-30 | End: 2022-08-30 | Stop reason: SDUPTHER

## 2022-08-30 RX ORDER — SODIUM CHLORIDE 0.9 % (FLUSH) 0.9 %
5-40 SYRINGE (ML) INJECTION PRN
Status: CANCELLED | OUTPATIENT
Start: 2022-08-30

## 2022-08-30 RX ORDER — PROCHLORPERAZINE EDISYLATE 5 MG/ML
5 INJECTION INTRAMUSCULAR; INTRAVENOUS
Status: CANCELLED | OUTPATIENT
Start: 2022-08-30 | End: 2022-08-30

## 2022-08-30 RX ORDER — SODIUM CHLORIDE 0.9 % (FLUSH) 0.9 %
5-40 SYRINGE (ML) INJECTION PRN
Status: DISCONTINUED | OUTPATIENT
Start: 2022-08-30 | End: 2022-08-31 | Stop reason: HOSPADM

## 2022-08-30 RX ORDER — OXYCODONE HYDROCHLORIDE 5 MG/1
10 TABLET ORAL PRN
Status: CANCELLED | OUTPATIENT
Start: 2022-08-30 | End: 2022-08-30

## 2022-08-30 RX ORDER — OXYCODONE HYDROCHLORIDE 5 MG/1
5 TABLET ORAL PRN
Status: CANCELLED | OUTPATIENT
Start: 2022-08-30 | End: 2022-08-30

## 2022-08-30 RX ORDER — SODIUM CHLORIDE 9 MG/ML
INJECTION, SOLUTION INTRAVENOUS CONTINUOUS
Status: CANCELLED | OUTPATIENT
Start: 2022-08-30

## 2022-08-30 RX ORDER — AMLODIPINE BESYLATE 10 MG/1
10 TABLET ORAL DAILY
Status: DISCONTINUED | OUTPATIENT
Start: 2022-08-30 | End: 2022-08-31 | Stop reason: HOSPADM

## 2022-08-30 RX ORDER — FENTANYL CITRATE 50 UG/ML
25 INJECTION, SOLUTION INTRAMUSCULAR; INTRAVENOUS EVERY 5 MIN PRN
Status: CANCELLED | OUTPATIENT
Start: 2022-08-30

## 2022-08-30 RX ORDER — LABETALOL HYDROCHLORIDE 5 MG/ML
10 INJECTION, SOLUTION INTRAVENOUS
Status: CANCELLED | OUTPATIENT
Start: 2022-08-30

## 2022-08-30 RX ORDER — GLYCOPYRROLATE 0.2 MG/ML
INJECTION INTRAMUSCULAR; INTRAVENOUS PRN
Status: DISCONTINUED | OUTPATIENT
Start: 2022-08-30 | End: 2022-08-30 | Stop reason: SDUPTHER

## 2022-08-30 RX ORDER — CHLORHEXIDINE GLUCONATE 4 G/100ML
SOLUTION TOPICAL ONCE
Status: COMPLETED | OUTPATIENT
Start: 2022-08-30 | End: 2022-08-30

## 2022-08-30 RX ORDER — PROPOFOL 10 MG/ML
INJECTION, EMULSION INTRAVENOUS CONTINUOUS PRN
Status: DISCONTINUED | OUTPATIENT
Start: 2022-08-30 | End: 2022-08-30 | Stop reason: SDUPTHER

## 2022-08-30 RX ORDER — DIPHENHYDRAMINE HYDROCHLORIDE 50 MG/ML
12.5 INJECTION INTRAMUSCULAR; INTRAVENOUS
Status: CANCELLED | OUTPATIENT
Start: 2022-08-30 | End: 2022-08-30

## 2022-08-30 RX ORDER — PROPOFOL 10 MG/ML
INJECTION, EMULSION INTRAVENOUS
Status: COMPLETED
Start: 2022-08-30 | End: 2022-08-30

## 2022-08-30 RX ORDER — ONDANSETRON 2 MG/ML
4 INJECTION INTRAMUSCULAR; INTRAVENOUS
Status: CANCELLED | OUTPATIENT
Start: 2022-08-30 | End: 2022-08-30

## 2022-08-30 RX ADMIN — CHLORHEXIDINE GLUCONATE: 213 SOLUTION TOPICAL at 16:17

## 2022-08-30 RX ADMIN — SODIUM CHLORIDE, PRESERVATIVE FREE 10 ML: 5 INJECTION INTRAVENOUS at 20:25

## 2022-08-30 RX ADMIN — SODIUM CHLORIDE, PRESERVATIVE FREE 10 ML: 5 INJECTION INTRAVENOUS at 10:37

## 2022-08-30 RX ADMIN — PROPOFOL 100 MCG/KG/MIN: 10 INJECTION, EMULSION INTRAVENOUS at 12:59

## 2022-08-30 RX ADMIN — GLYCOPYRROLATE 0.2 MG: 0.2 INJECTION INTRAMUSCULAR; INTRAVENOUS at 13:04

## 2022-08-30 RX ADMIN — CEFAZOLIN 2000 MG: 2 INJECTION, POWDER, FOR SOLUTION INTRAMUSCULAR; INTRAVENOUS at 13:07

## 2022-08-30 RX ADMIN — PROPOFOL 30 MG: 10 INJECTION, EMULSION INTRAVENOUS at 12:59

## 2022-08-30 RX ADMIN — AMLODIPINE BESYLATE 10 MG: 10 TABLET ORAL at 10:35

## 2022-08-30 RX ADMIN — LIDOCAINE HYDROCHLORIDE 50 MG: 20 INJECTION, SOLUTION EPIDURAL; INFILTRATION; INTRACAUDAL; PERINEURAL at 12:59

## 2022-08-30 ASSESSMENT — PAIN SCALES - GENERAL
PAINLEVEL_OUTOF10: 0

## 2022-08-30 NOTE — PLAN OF CARE
Problem: Discharge Planning  Goal: Discharge to home or other facility with appropriate resources  Outcome: Progressing  Flowsheets  Taken 8/29/2022 2330 by Jet Dias RN  Discharge to home or other facility with appropriate resources: Identify barriers to discharge with patient and caregiver    Problem: ABCDS Injury Assessment  Goal: Absence of physical injury  Outcome: Progressing  Flowsheets (Taken 8/30/2022 0147)  Absence of Physical Injury: Implement safety measures based on patient assessment     Problem: Safety - Adult  Goal: Free from fall injury  Outcome: Progressing  Flowsheets (Taken 8/30/2022 0147)  Free From Fall Injury:   Instruct family/caregiver on patient safety   Based on caregiver fall risk screen, instruct family/caregiver to ask for assistance with transferring infant if caregiver noted to have fall risk factors  Note: Bed in lowest position, call light within reach. Problem: Respiratory - Adult  Goal: Achieves optimal ventilation and oxygenation  Outcome: Progressing  Flowsheets (Taken 8/30/2022 0147)  Achieves optimal ventilation and oxygenation:   Assess for changes in respiratory status   Assess for changes in mentation and behavior   Position to facilitate oxygenation and minimize respiratory effort   Assess and instruct to report shortness of breath or any respiratory difficulty   Respiratory therapy support as indicated  Note: Patient on 2L on oxygen while sleeping. Problem: Cardiovascular - Adult  Goal: Absence of cardiac dysrhythmias or at baseline  Outcome: Progressing  Flowsheets (Taken 8/30/2022 0147)  Absence of cardiac dysrhythmias or at baseline:   Monitor cardiac rate and rhythm   Assess for signs of decreased cardiac output   Administer antiarrhythmia medication and electrolyte replacement as ordered  Note: Patient sustaining HR in the 30's. Asymptomatic. Closely monitoring.

## 2022-08-30 NOTE — ANESTHESIA POSTPROCEDURE EVALUATION
Department of Anesthesiology  Postprocedure Note    Patient: Saadia Schmidt  MRN: [id number]  YOB: 1950  Date of evaluation: 8/30/2022      Procedure Summary     Date: 08/30/22 Room / Location: [facility] Cath Lab    Anesthesia Start: 0758 Anesthesia Stop: 5805    Procedure: Chris Garrett PACEMAKER W ANES Diagnosis:     Scheduled Providers: Evonne Alexandre DO Responsible Provider: Bong Crowell MD    Anesthesia Type: general, MAC ASA Status: 3          Anesthesia Type: No value filed.     Nadeen Phase I:      Nadeen Phase II:        Anesthesia Post Evaluation    Patient location during evaluation: PACU  Level of consciousness: awake  Complications: no  Multimodal analgesia pain management approach

## 2022-08-30 NOTE — ANESTHESIA PRE PROCEDURE
Department of Anesthesiology  Preprocedure Note       Name:  Coleen Doherty   Age:  81 y.o.  :  1950                                          MRN:  [id number]         Date:  2022      Surgeon: * No surgeons listed *    Procedure: * No procedures listed *    Medications prior to admission:   Prior to Admission medications    Medication Sig Start Date End Date Taking? Authorizing Provider   Cholecalciferol (VITAMIN D3) 50 MCG ( UT) CAPS Take by mouth daily    Historical Provider, MD   spironolactone (ALDACTONE) 25 MG tablet Take 1 tablet by mouth in the morning. 8/10/22   NELA Fonseca CNP   atorvastatin (LIPITOR) 10 MG tablet Take 1 tablet by mouth in the morning. 8/10/22   NELA Fonseca CNP   ferrous sulfate 325 (65 Fe) MG tablet Take 325 mg by mouth    Historical Provider, MD   amLODIPine (NORVASC) 10 MG tablet Take 10 mg by mouth daily. Historical Provider, MD       Current medications:    No current facility-administered medications for this visit. No current outpatient medications on file.      Facility-Administered Medications Ordered in Other Visits   Medication Dose Route Frequency Provider Last Rate Last Admin    sodium chloride flush 0.9 % injection 5-40 mL  5-40 mL IntraVENous 2 times per day NELA Pepper CNP   10 mL at 22 1037    sodium chloride flush 0.9 % injection 5-40 mL  5-40 mL IntraVENous PRN NELA Pepper - CNP        0.9 % sodium chloride infusion   IntraVENous PRN NELA Pepper CNP        chlorhexidine (HIBICLENS) 4 % liquid   Topical Once NELA Pepper CNP        ceFAZolin (ANCEF) 2,000 mg in sodium chloride 0.9 % 50 mL IVPB (mini-bag)  2,000 mg IntraVENous On Call to [address], APRN - CNP        amLODIPine (NORVASC) tablet 10 mg  10 mg Oral Daily NELA Pepper CNP   10 mg at 22 1035    sodium chloride flush 0.9 % injection 5-40 mL  5-40 mL IntraVENous 2 times per day Bassam Hammer MD 10 mL at 08/29/22 2127    sodium chloride flush 0.9 % injection 5-40 mL  5-40 mL IntraVENous PRN Evelia Middleton MD        0.9 % sodium chloride infusion   IntraVENous PRN Evelia Middleton MD        enoxaparin (LOVENOX) injection 40 mg  40 mg SubCUTAneous Daily Evelia Middleton MD   40 mg at 08/29/22 2026    ondansetron (ZOFRAN-ODT) disintegrating tablet 4 mg  4 mg Oral Q8H PRN Evelia Middleton MD        Or    [facility] injection 4 mg  4 mg IntraVENous Q6H PRN Evelia Middleton MD        polyethylene glycol (GLYCOLAX) packet 17 g  17 g Oral Daily PRN Evelia Middleton MD        acetaminophen (TYLENOL) tablet 650 mg  650 mg Oral Q6H PRN Evelia Middleton MD        Or   Vickie Blank acetaminophen (TYLENOL) suppository 650 mg  650 mg Rectal Q6H PRN Evelia Middleton MD           Allergies:  No Known Allergies    Problem List:    Patient Active Problem List   Diagnosis Code    Abnormal EKG R94.31    Other hyperlipidemia E78.49    Bradycardia R00.1    GERD without esophagitis K21.9    Hypertension, essential I10    Obstructive sleep apnea (adult) (pediatric) G47.33    Syncope R55    Class 1 obesity due to excess calories with serious comorbidity and body mass index (BMI) of 30.0 to 30.9 in adult E66.09, Z68.30    Arthritis of left hip M16.12    Complete heart block (HCC) I44.2       Past Medical History:        Diagnosis Date    Anesthesia     high blood pressure after surgery    [address] ([facility] [address]     COLON    DVT (deep venous thrombosis) ([facility] [address]     GERD without esophagitis 5/2/2019    Hypertension     Hypertension, essential 5/2/2019    Obstructive sleep apnea (adult) (pediatric)        Past Surgical History:        Procedure Laterality Date    CATARACT REMOVAL WITH IMPLANT  08/14/2012    phaco left eye/ IOL    CATARACT REMOVAL WITH IMPLANT  8/28/2012    Right     COLONOSCOPY      HERNIA REPAIR       TERI    SHOULDER SURGERY      LEFT    SHOULDER SURGERY  12-26-13    BILATERAL LEVATOR ADVANCEMENT, EXCISION OF MULTIPLE NEVIS ON    SMALL INTESTINE SURGERY      TOTAL HIP ARTHROPLASTY Left 4/12/2022    LEFT TOTAL HIP REPLACEMENT DIRECT ANTERIOR - DEPUY ADVANCED performed by James Reddy MD at [facility]  04/19/2017    UPPER GASTROINTESTINAL ENDOSCOPY  06/06/2018    EUS, gastric nodule biopsy       Social History:    Social History     Tobacco Use    Smoking status: Never    Smokeless tobacco: Never   Substance Use Topics    Alcohol use: No                                Counseling given: Not Answered      Vital Signs (Current): There were no vitals filed for this visit.                                            BP Readings from Last 3 Encounters:   08/30/22 (!) 154/73   08/29/22 130/60   08/10/22 130/66       NPO Status:                                                                                 BMI:   Wt Readings from Last 3 Encounters:   08/30/22 210 lb 8.6 oz (95.5 kg)   08/29/22 214 lb (97.1 kg)   08/10/22 211 lb 6.4 oz (95.9 kg)     There is no height or weight on file to calculate BMI.    CBC:   Lab Results   Component Value Date/Time    WBC 7.8 08/30/2022 06:32 AM    RBC 4.64 08/30/2022 06:32 AM    HGB 12.7 08/30/2022 06:32 AM    HCT 39.4 08/30/2022 06:32 AM    MCV 85.0 08/30/2022 06:32 AM    RDW 14.3 08/30/2022 06:32 AM     08/30/2022 06:32 AM       CMP:   Lab Results   Component Value Date/Time     08/30/2022 06:32 AM    K 4.4 08/30/2022 06:32 AM     08/30/2022 06:32 AM    CO2 24 08/30/2022 06:32 AM    BUN 22 08/30/2022 06:32 AM    CREATININE 1.4 08/30/2022 06:32 AM    GFRAA >60 08/30/2022 06:32 AM    AGRATIO 1.4 08/30/2022 06:32 AM    LABGLOM 50 08/30/2022 06:32 AM    GLUCOSE 92 08/30/2022 06:32 AM    PROT 6.7 08/30/2022 06:32 AM    CALCIUM 8.8 08/30/2022 06:32 AM    BILITOT 0.7 08/30/2022 06:32 AM    ALKPHOS 99 08/30/2022 06:32 AM    AST 26 08/30/2022 06:32 AM    ALT 40 08/30/2022 06:32 AM       POC Tests: No results for input(s): POCGLU, POCNA, POCK, POCCL, POCBUN, POCHEMO, POCHCT in the last 72 hours. Coags:   Lab Results   Component Value Date/Time    PROTIME 14.7 08/30/2022 06:32 AM    INR 1.16 08/30/2022 06:32 AM    APTT 38.1 04/12/2022 08:39 AM       HCG (If Applicable): No results found for: PREGTESTUR, PREGSERUM, HCG, HCGQUANT     ABGs: No results found for: PHART, PO2ART, EBJ8QBA, YVH2VFM, BEART, T8FESBII     Type & Screen (If Applicable):  No results found for: LABABO, LABRH    Drug/Infectious Status (If Applicable):  No results found for: HIV, HEPCAB    COVID-19 Screening (If Applicable): No results found for: COVID19        Anesthesia Evaluation  Patient summary reviewed and Nursing notes reviewed history of anesthetic complications:   Airway: Mallampati: II  TM distance: >3 FB   Neck ROM: full  Mouth opening: > = 3 FB   Dental:          Pulmonary:   (+) sleep apnea:                             Cardiovascular:    (+) hypertension:,         Rhythm: regular  Rate: normal                    Neuro/Psych:               GI/Hepatic/Renal:   (+) GERD:,           Endo/Other:                     Abdominal:             Vascular:   + DVT, .        Other Findings:             Anesthesia Plan      general and MAC     ASA 3                                   Ale Pierce MD   8/30/2022

## 2022-08-30 NOTE — DISCHARGE INSTRUCTIONS
[facility] office at:  [phone]  Akbar Viera RN    Permanent Pacemaker (PPM)   Implantable Cardioverter Defibrillator (ICD)  Care Instructions      Your permanent pacemaker/ICD is a sophisticated piece of electronic equipment and both the wound and the device need special care during your recovery period and into the future. Please use these instructions as guide. If you have any questions please call the office. Wound Care:   Keep the incision site clean and dry for one week. Do not get the incision or bandage wet. You may sponge bathe but DO NOT shower for the first seven days or until after your first follow up office visit. Do not remove the outer dressing   DO NOT apply soaps, ointments,  lotion or powder to the incision site. Wear comfortable clothes that will not rub on the incision site. Avoid bra straps or suspenders. At your one week follow up appointment your bandage will be removed and you will be given further instruction on care of the site at that time. Your bra strap may lay directly over the incision. If it does - please do not wear the bra strap on the incisional side for 4 weeks to prevent irritation. When to contact the office:  Changes in how your incision site is healing including:  Increase in swelling and/or tenderness   Increase in redness at the incision site or surrounding the device  Drainage from the incision  If you experience:  Lightheadedness, dizziness or passing out  Increase in fatigue or shortness of breath  Fever (temperature greater than 100 degrees F)  Chills   Prolonged hiccups or chest discomfort  If you have any questions     Activity:   Do not raise your elbow above shoulder level or reach behind your back for 4 weeks after your procedure.   DO NOT lift more than 8 pounds with your affected arm for 4 weeks after your procedure. (A gallon of milk is 8 pounds). Avoid excessive pushing, pulling or twisting. DO NOT drive until instructed it is OK by your provider. Wear a sling only as a reminder to limit the activity of your affected arm. It is important to remember to still use your affected arm to maintain mobility but no excessive movements. No sports activities until approved by your provider. Precautions: You may use common household appliances, if they are in good repair (even microwaves). You should not lean against them while they are on. When using cellular and cordless phones, keep them at least 6 inches away from your device. (Use on the opposite side of your device.)  Do not hold or carry strong magnets. You may NOT perform welding. Airport and security screening devices should be avoided. You will need to show your ID card and ask for a hand search. Always tell your health care professional (including the dentist) that you have a device and show your ID card. ID Card: You will be provided a temporary ID card at the time of your hospital discharge and a permanent ID card in approximately 8 weeks. It is very important that you carry this ID card with you at ALL times. Consider purchasing a medical alert bracelet identifying your device and . Please refer to your pacemaker booklet for more information about your device. Follow Up Care: The pacemaker/ICD DOES NOT replace your current medications. It is important for you to continue your medications as prescribed. You will be given your first follow up appointment approximately one week after your procedure to check your wound and device. You will then have a follow up approximately one month after that appointment and then every 3-6 months thereafter. If you are unsure of your follow up appointment [address] FOLLOW-UP APPOINTMENTS    Follow-up with device technician,  in 7-10 days for a wound and device check.   Follow-up with NP at 1 month for a wound and device check. Follow-up with MD at 3 months for an appointment and device check. [address], [address] Phone: [phone]. If you are unable to make this appointment, please call to reschedule.

## 2022-08-30 NOTE — PROGRESS NOTES
Pacer pads placed on pt for prophylaxis due to bradycardia in 30s.     Electronically signed by José Luis Norwood RN on 0/67/9832 at 10:43 PM

## 2022-08-30 NOTE — PROGRESS NOTES
Home medications placed in ziploc bag and walked down to pharmacy by aliya Harmon RN.     Electronically signed by Patty Austin RN on 8/21/0941 at 10:39 PM

## 2022-08-30 NOTE — PLAN OF CARE
Problem: ABCDS Injury Assessment  Goal: Absence of physical injury  Recent Flowsheet Documentation  Taken 8/30/2022 1859 by Michelle Chinchilla RN  Absence of Physical Injury: Implement safety measures based on patient assessment     Problem: Safety - Adult  Goal: Free from fall injury  Outcome: Progressing  Flowsheets (Taken 8/30/2022 1859)  Free From Fall Injury:   Instruct family/caregiver on patient safety   Based on caregiver fall risk screen, instruct family/caregiver to ask for assistance with transferring infant if caregiver noted to have fall risk factors     Problem: Cardiovascular - Adult  Goal: Absence of cardiac dysrhythmias or at baseline  Outcome: Progressing  Flowsheets (Taken 8/30/2022 0147 by Bina Castaneda RN)  Absence of cardiac dysrhythmias or at baseline:   Monitor cardiac rate and rhythm   Assess for signs of decreased cardiac output   Administer antiarrhythmia medication and electrolyte replacement as ordered

## 2022-08-30 NOTE — PLAN OF CARE
Reviewed PPM procedure with both patient and wife at bedside. All questions answered. Discussed post procedure wound care and activity limitations, also placed instructions in chart. Pre procedure orders placed.

## 2022-08-30 NOTE — PROGRESS NOTES
Pt was direct admit 8/29/22. Pt Blood pressure is elevated. MD notified. No new orders at this time.   Electronically signed by Domi Eden RN on 8/30/2022 at 10:11 AM

## 2022-08-30 NOTE — PROGRESS NOTES
Pt went to cath lab for pacer placement.     Electronically signed by Lady Ronni RN on 8/30/2022 at 11:20 AM

## 2022-08-30 NOTE — CONSULTS
[facility]   Cardiac Electrophysiology Consultation   Date: 8/30/2022  Admit Date:  8/29/2022  Reason for Consultation:   Consult Requesting Physician: Cornell Avalos MD     No chief complaint on file. HPI: Lj Fernandez is a 81 y.o. gentleman with a past medical history significant for hypertension, hyperlipidemia, obstructive sleep apnea, GERD came to see Dr. Mika Hutson as an outpatient with a history of ongoing lightheadedness and dizziness. He had a cardiac monitor which showed transient complete heart block. Yesterday, when he was seen in the office, his EKG showed complete heart block with a stable ventricular rate. He continues to have symptoms of dizziness and lightheadedness. Hence, he was admitted to the hospital as a direct admit. He denies any syncope. Echocardiogram from last year showed normal LV ejection fraction. EKG shows complete heart block    Tele shows complete heart block      Impression:  Complete heart block    Recommendations:    Dual-chamber pacemaker implantation    The risks and benefits of a PPM implantation were discussed at length with the patient, Much time was spent with the patient in a shared-decision making approach, discussing the pathophysiology of the patient's diagnosis of complete heart block, the risk of presyncope or syncope, the utility of a PPM, the benefit and risks of the implantation of a PPM, the benefits and risks of living with a PPM, the lifestyle changes that come along with having a PPM, and the logistics of a PPM care and generator changes that go along with having a PPM.    The risks including, but not limited to, the risks of vascular injury, bleeding, infection, device malfunction, lead dislodgement, radiation exposure, injury to cardiac and surrounding structures (including pneumothorax), stroke, myocardial infarction and death were discussed in detail.       Past Medical History:   Diagnosis Date    Anesthesia     high blood pressure after surgery    Cancer ([facility] [address]     COLON    DVT (deep venous thrombosis) (HCC)     GERD without esophagitis 5/2/2019    Hypertension     Hypertension, essential 5/2/2019    Obstructive sleep apnea (adult) (pediatric)         Past Surgical History:   Procedure Laterality Date    CATARACT REMOVAL WITH IMPLANT  08/14/2012    phaco left eye/ IOL    CATARACT REMOVAL WITH IMPLANT  8/28/2012    Right     COLONOSCOPY      HERNIA REPAIR       TERI    SHOULDER SURGERY      LEFT    SHOULDER SURGERY  12-26-13    BILATERAL LEVATOR ADVANCEMENT, EXCISION OF MULTIPLE NEVIS ON    SMALL INTESTINE SURGERY      TOTAL HIP ARTHROPLASTY Left 4/12/2022    LEFT TOTAL HIP REPLACEMENT DIRECT ANTERIOR - DEPUY ADVANCED performed by Clementine Carpenter MD at [address]  04/19/2017    UPPER GASTROINTESTINAL ENDOSCOPY  06/06/2018    EUS, gastric nodule biopsy       No Known Allergies    Social History:  Reviewed. reports that he has never smoked. He has never used smokeless tobacco. He reports that he does not drink alcohol and does not use drugs. Family History:  Reviewed. family history includes High Blood Pressure in his mother. No premature CAD. Review of System:  All other systems reviewed except for that noted above. Pertinent negatives and positives are:     Objective      General: negative for fever, chills   Ophthalmic ROS: negative for - eye pain or loss of vision  ENT ROS: negative for - headaches, sore throat   Respiratory: negative for - cough, sputum  Cardiovascular: Reviewed in HPI  Gastrointestinal: negative for - abdominal pain, diarrhea, N/V  Hematology: negative for - bleeding, blood clots, bruising or jaundice  Genito-Urinary:  negative for - Dysuria or incontinence  Musculoskeletal: negative for - Joint swelling, muscle pain  Neurological: negative for - confusion, dizziness, headaches   Psychiatric: No anxiety, no depression.   Dermatological: negative for - rash    Physical Examination:  Vitals:    22 0843   BP: (!) 154/73   Pulse: (!) 49   Resp: 18   Temp: 98 °F (36.7 °C)   SpO2: 100%        Intake/Output Summary (Last 24 hours) at 2022 1453  Last data filed at 2022 0843  Gross per 24 hour   Intake 0 ml   Output 0 ml   Net 0 ml     No intake/output data recorded. Wt Readings from Last 3 Encounters:   22 210 lb 8.6 oz (95.5 kg)   22 214 lb (97.1 kg)   08/10/22 211 lb 6.4 oz (95.9 kg)     Temp  Av.1 °F (36.7 °C)  Min: 98 °F (36.7 °C)  Max: 98.2 °F (36.8 °C)  Pulse  Av.7  Min: 30  Max: 50  BP  Min: 145/61  Max: 176/78  SpO2  Av.8 %  Min: 99 %  Max: 100 %    Telemetry: Complete heart block    Constitutional: Alert. Oriented to person, place, and time. No distress. Head: Normocephalic and atraumatic. Mouth/Throat: Lips appear moist. Oropharynx is clear and moist.  Eyes: Conjunctivae normal. EOM are normal.   Neck: Neck supple. No lymphadenopathy. No rigidity. No JVD present. Cardiovascular: Bradycardia, regular rhythm. Normal S1&S2. Carotid pulse 2+ bilaterally. Pulmonary/Chest: Bilateral respiratory sounds present. No respiratory accessory muscle use. No wheezes, No rhonchi. Abdominal: Soft. Normal bowel sounds present. No distension, No tenderness. No splenomegaly. No hernia. Musculoskeletal: No tenderness. No edema    Lymphadenopathy: Has no cervical adenopathy. Neurological: Alert and oriented. Cranial nerve II-XII grossly intact, No gross deficit to touch. Skin: Skin is warm and dry. No rash, lesions, ulcerations noted. Psychiatric: No anxiety nor agitation. Labs:  Reviewed.    Recent Labs     22  0632      K 4.4      CO2 24   BUN 22*   CREATININE 1.4*     Recent Labs     22  0632   WBC 7.8   HGB 12.7*   HCT 39.4*   MCV 85.0        No results found for: CKTOTAL, CKMB, CKMBINDEX, TROPONINI  No results found for: BNP  Lab Results   Component Value Date/Time    PROTIME 14.7 2022 06:32 AM PROTIME 14.2 04/12/2022 08:39 AM    PROTIME 17.1 03/21/2022 01:32 PM    INR 1.16 08/30/2022 06:32 AM    INR 1.25 04/12/2022 08:39 AM    INR 1.49 03/21/2022 01:32 PM     Lab Results   Component Value Date/Time    CHOL 159 08/25/2022 10:38 AM    HDL 47 08/25/2022 10:38 AM    TRIG 76 08/25/2022 10:38 AM       Diagnostic and imaging results reviewed. I independently reviewed the ECG and telemetry. Scheduled Meds:   sodium chloride flush  5-40 mL IntraVENous 2 times per day    chlorhexidine   Topical Once    amLODIPine  10 mg Oral Daily    sodium chloride flush  5-40 mL IntraVENous 2 times per day    sodium chloride flush  5-40 mL IntraVENous 2 times per day    enoxaparin  40 mg SubCUTAneous Daily     Continuous Infusions:   sodium chloride      sodium chloride      sodium chloride       PRN Meds:.sodium chloride flush, sodium chloride, sodium chloride flush, sodium chloride, sodium chloride flush, sodium chloride, ondansetron **OR** ondansetron, polyethylene glycol, acetaminophen **OR** acetaminophen     Assessment:   Patient Active Problem List    Diagnosis Date Noted    Complete heart block ([address] 08/29/2022    Arthritis of left hip 01/13/2022    Class 1 obesity due to excess calories with serious comorbidity and body mass index (BMI) of 30.0 to 30.9 in adult 09/09/2019    Syncope     Obstructive sleep apnea (adult) (pediatric)     GERD without esophagitis 05/02/2019    Hypertension, essential 05/02/2019    Bradycardia 04/10/2019    Abnormal EKG 03/06/2019    Other hyperlipidemia 03/06/2019      Active Hospital Problems    Diagnosis Date Noted    Complete heart block [facility]) [I44.2] 08/29/2022     Priority: Medium       Thank you for allowing me to participate in the care of [address] [place] [address] . If you have any questions/comments, please do not hesitate to contact us.       Leigh Rodriguez MD   Cardiac Electrophysiology  [address] [place] [address] Dr any EP related issues after 5 PM, contact [facility] on call cardiology through .

## 2022-08-30 NOTE — PROGRESS NOTES
Pt declined shift updates this AM.    Electronically signed by José Luis Norwood RN on 8/46/0782 at 6:01 AM

## 2022-08-31 ENCOUNTER — ANESTHESIA (OUTPATIENT)
Dept: CARDIAC CATH/INVASIVE PROCEDURES | Age: 72
DRG: 243 | End: 2022-08-31
Payer: MEDICARE

## 2022-08-31 ENCOUNTER — APPOINTMENT (OUTPATIENT)
Dept: GENERAL RADIOLOGY | Age: 72
DRG: 243 | End: 2022-08-31
Attending: INTERNAL MEDICINE
Payer: MEDICARE

## 2022-08-31 ENCOUNTER — NURSE ONLY (OUTPATIENT)
Dept: CARDIOLOGY CLINIC | Age: 72
End: 2022-08-31
Payer: MEDICARE

## 2022-08-31 ENCOUNTER — PROCEDURE VISIT (OUTPATIENT)
Dept: CARDIOLOGY CLINIC | Age: 72
End: 2022-08-31

## 2022-08-31 ENCOUNTER — ANESTHESIA EVENT (OUTPATIENT)
Dept: CARDIAC CATH/INVASIVE PROCEDURES | Age: 72
DRG: 243 | End: 2022-08-31
Payer: MEDICARE

## 2022-08-31 VITALS
HEIGHT: 69 IN | OXYGEN SATURATION: 97 % | BODY MASS INDEX: 31.41 KG/M2 | HEART RATE: 60 BPM | WEIGHT: 212.08 LBS | RESPIRATION RATE: 16 BRPM | SYSTOLIC BLOOD PRESSURE: 159 MMHG | TEMPERATURE: 98 F | DIASTOLIC BLOOD PRESSURE: 102 MMHG

## 2022-08-31 DIAGNOSIS — I44.2 CHB (COMPLETE HEART BLOCK) (HCC): ICD-10-CM

## 2022-08-31 DIAGNOSIS — Z95.0 PACEMAKER: ICD-10-CM

## 2022-08-31 DIAGNOSIS — R00.1 BRADYCARDIA: ICD-10-CM

## 2022-08-31 DIAGNOSIS — R55 SYNCOPE, UNSPECIFIED SYNCOPE TYPE: ICD-10-CM

## 2022-08-31 DIAGNOSIS — Z95.0 PACEMAKER: Primary | ICD-10-CM

## 2022-08-31 LAB
INR BLD: 1.17 (ref 0.87–1.14)
PROTHROMBIN TIME: 14.8 SEC (ref 11.7–14.5)

## 2022-08-31 PROCEDURE — 2580000003 HC RX 258: Performed by: INTERNAL MEDICINE

## 2022-08-31 PROCEDURE — 85610 PROTHROMBIN TIME: CPT

## 2022-08-31 PROCEDURE — 2709999900 HC NON-CHARGEABLE SUPPLY

## 2022-08-31 PROCEDURE — 6370000000 HC RX 637 (ALT 250 FOR IP): Performed by: INTERNAL MEDICINE

## 2022-08-31 PROCEDURE — 6360000002 HC RX W HCPCS: Performed by: NURSE ANESTHETIST, CERTIFIED REGISTERED

## 2022-08-31 PROCEDURE — 2580000003 HC RX 258: Performed by: NURSE ANESTHETIST, CERTIFIED REGISTERED

## 2022-08-31 PROCEDURE — 33215 REPOSITION PACING-DEFIB LEAD: CPT | Performed by: INTERNAL MEDICINE

## 2022-08-31 PROCEDURE — 6360000002 HC RX W HCPCS

## 2022-08-31 PROCEDURE — 93280 PM DEVICE PROGR EVAL DUAL: CPT | Performed by: INTERNAL MEDICINE

## 2022-08-31 PROCEDURE — 33215 REPOSITION PACING-DEFIB LEAD: CPT

## 2022-08-31 PROCEDURE — 6370000000 HC RX 637 (ALT 250 FOR IP): Performed by: NURSE PRACTITIONER

## 2022-08-31 PROCEDURE — 2720000010 HC SURG SUPPLY STERILE

## 2022-08-31 PROCEDURE — 99239 HOSP IP/OBS DSCHRG MGMT >30: CPT | Performed by: NURSE PRACTITIONER

## 2022-08-31 PROCEDURE — 36415 COLL VENOUS BLD VENIPUNCTURE: CPT

## 2022-08-31 PROCEDURE — 2500000003 HC RX 250 WO HCPCS

## 2022-08-31 PROCEDURE — 2500000003 HC RX 250 WO HCPCS: Performed by: NURSE ANESTHETIST, CERTIFIED REGISTERED

## 2022-08-31 PROCEDURE — 71045 X-RAY EXAM CHEST 1 VIEW: CPT

## 2022-08-31 PROCEDURE — 99233 SBSQ HOSP IP/OBS HIGH 50: CPT | Performed by: INTERNAL MEDICINE

## 2022-08-31 PROCEDURE — 02WA3MZ REVISION OF CARDIAC LEAD IN HEART, PERCUTANEOUS APPROACH: ICD-10-PCS | Performed by: INTERNAL MEDICINE

## 2022-08-31 RX ORDER — LIDOCAINE HYDROCHLORIDE 20 MG/ML
INJECTION, SOLUTION INFILTRATION; PERINEURAL PRN
Status: DISCONTINUED | OUTPATIENT
Start: 2022-08-31 | End: 2022-08-31 | Stop reason: SDUPTHER

## 2022-08-31 RX ORDER — SODIUM CHLORIDE 0.9 % (FLUSH) 0.9 %
5-40 SYRINGE (ML) INJECTION EVERY 12 HOURS SCHEDULED
Status: DISCONTINUED | OUTPATIENT
Start: 2022-08-31 | End: 2022-08-31 | Stop reason: HOSPADM

## 2022-08-31 RX ORDER — SODIUM CHLORIDE 9 MG/ML
INJECTION, SOLUTION INTRAVENOUS PRN
Status: DISCONTINUED | OUTPATIENT
Start: 2022-08-31 | End: 2022-08-31 | Stop reason: HOSPADM

## 2022-08-31 RX ORDER — HYDRALAZINE HYDROCHLORIDE 20 MG/ML
10 INJECTION INTRAMUSCULAR; INTRAVENOUS
Status: DISCONTINUED | OUTPATIENT
Start: 2022-08-31 | End: 2022-08-31 | Stop reason: HOSPADM

## 2022-08-31 RX ORDER — DIPHENHYDRAMINE HYDROCHLORIDE 50 MG/ML
12.5 INJECTION INTRAMUSCULAR; INTRAVENOUS
Status: DISCONTINUED | OUTPATIENT
Start: 2022-08-31 | End: 2022-08-31 | Stop reason: HOSPADM

## 2022-08-31 RX ORDER — OXYCODONE HYDROCHLORIDE 5 MG/1
5 TABLET ORAL PRN
Status: DISCONTINUED | OUTPATIENT
Start: 2022-08-31 | End: 2022-08-31 | Stop reason: HOSPADM

## 2022-08-31 RX ORDER — PROPOFOL 10 MG/ML
INJECTION, EMULSION INTRAVENOUS PRN
Status: DISCONTINUED | OUTPATIENT
Start: 2022-08-31 | End: 2022-08-31 | Stop reason: SDUPTHER

## 2022-08-31 RX ORDER — CEFAZOLIN SODIUM 1 G/3ML
INJECTION, POWDER, FOR SOLUTION INTRAMUSCULAR; INTRAVENOUS PRN
Status: DISCONTINUED | OUTPATIENT
Start: 2022-08-31 | End: 2022-08-31 | Stop reason: SDUPTHER

## 2022-08-31 RX ORDER — METOCLOPRAMIDE HYDROCHLORIDE 5 MG/ML
10 INJECTION INTRAMUSCULAR; INTRAVENOUS
Status: DISCONTINUED | OUTPATIENT
Start: 2022-08-31 | End: 2022-08-31 | Stop reason: HOSPADM

## 2022-08-31 RX ORDER — FENTANYL CITRATE 50 UG/ML
INJECTION, SOLUTION INTRAMUSCULAR; INTRAVENOUS PRN
Status: DISCONTINUED | OUTPATIENT
Start: 2022-08-31 | End: 2022-08-31 | Stop reason: SDUPTHER

## 2022-08-31 RX ORDER — SODIUM CHLORIDE 0.9 % (FLUSH) 0.9 %
5-40 SYRINGE (ML) INJECTION PRN
Status: DISCONTINUED | OUTPATIENT
Start: 2022-08-31 | End: 2022-08-31 | Stop reason: HOSPADM

## 2022-08-31 RX ORDER — SODIUM CHLORIDE, SODIUM LACTATE, POTASSIUM CHLORIDE, CALCIUM CHLORIDE 600; 310; 30; 20 MG/100ML; MG/100ML; MG/100ML; MG/100ML
INJECTION, SOLUTION INTRAVENOUS CONTINUOUS PRN
Status: DISCONTINUED | OUTPATIENT
Start: 2022-08-31 | End: 2022-08-31 | Stop reason: SDUPTHER

## 2022-08-31 RX ORDER — MEPERIDINE HYDROCHLORIDE 25 MG/ML
12.5 INJECTION INTRAMUSCULAR; INTRAVENOUS; SUBCUTANEOUS EVERY 5 MIN PRN
Status: DISCONTINUED | OUTPATIENT
Start: 2022-08-31 | End: 2022-08-31 | Stop reason: HOSPADM

## 2022-08-31 RX ORDER — PROPOFOL 10 MG/ML
INJECTION, EMULSION INTRAVENOUS CONTINUOUS PRN
Status: DISCONTINUED | OUTPATIENT
Start: 2022-08-31 | End: 2022-08-31 | Stop reason: SDUPTHER

## 2022-08-31 RX ORDER — LABETALOL HYDROCHLORIDE 5 MG/ML
10 INJECTION, SOLUTION INTRAVENOUS
Status: DISCONTINUED | OUTPATIENT
Start: 2022-08-31 | End: 2022-08-31 | Stop reason: HOSPADM

## 2022-08-31 RX ORDER — OXYCODONE HYDROCHLORIDE 5 MG/1
10 TABLET ORAL PRN
Status: DISCONTINUED | OUTPATIENT
Start: 2022-08-31 | End: 2022-08-31 | Stop reason: HOSPADM

## 2022-08-31 RX ORDER — SODIUM CHLORIDE 9 MG/ML
25 INJECTION, SOLUTION INTRAVENOUS PRN
Status: DISCONTINUED | OUTPATIENT
Start: 2022-08-31 | End: 2022-08-31 | Stop reason: HOSPADM

## 2022-08-31 RX ADMIN — FENTANYL CITRATE 25 MCG: 50 INJECTION, SOLUTION INTRAMUSCULAR; INTRAVENOUS at 12:28

## 2022-08-31 RX ADMIN — PROPOFOL 20 MG: 10 INJECTION, EMULSION INTRAVENOUS at 12:05

## 2022-08-31 RX ADMIN — CEFAZOLIN SODIUM 2000 MG: 1 POWDER, FOR SOLUTION INTRAMUSCULAR; INTRAVENOUS at 11:37

## 2022-08-31 RX ADMIN — LIDOCAINE HYDROCHLORIDE 100 MG: 20 INJECTION, SOLUTION INFILTRATION; PERINEURAL at 11:36

## 2022-08-31 RX ADMIN — SODIUM CHLORIDE, PRESERVATIVE FREE 10 ML: 5 INJECTION INTRAVENOUS at 08:52

## 2022-08-31 RX ADMIN — FENTANYL CITRATE 25 MCG: 50 INJECTION, SOLUTION INTRAMUSCULAR; INTRAVENOUS at 12:05

## 2022-08-31 RX ADMIN — FENTANYL CITRATE 25 MCG: 50 INJECTION, SOLUTION INTRAMUSCULAR; INTRAVENOUS at 11:50

## 2022-08-31 RX ADMIN — SODIUM CHLORIDE, SODIUM LACTATE, POTASSIUM CHLORIDE, AND CALCIUM CHLORIDE: .6; .31; .03; .02 INJECTION, SOLUTION INTRAVENOUS at 11:31

## 2022-08-31 RX ADMIN — AMLODIPINE BESYLATE 10 MG: 10 TABLET ORAL at 08:52

## 2022-08-31 RX ADMIN — FENTANYL CITRATE 25 MCG: 50 INJECTION, SOLUTION INTRAMUSCULAR; INTRAVENOUS at 11:37

## 2022-08-31 RX ADMIN — ACETAMINOPHEN 650 MG: 325 TABLET, FILM COATED ORAL at 18:09

## 2022-08-31 RX ADMIN — PROPOFOL 50 MCG/KG/MIN: 10 INJECTION, EMULSION INTRAVENOUS at 11:36

## 2022-08-31 ASSESSMENT — PAIN DESCRIPTION - ORIENTATION: ORIENTATION: LEFT

## 2022-08-31 ASSESSMENT — PAIN SCALES - GENERAL
PAINLEVEL_OUTOF10: 0
PAINLEVEL_OUTOF10: 2

## 2022-08-31 ASSESSMENT — PAIN - FUNCTIONAL ASSESSMENT: PAIN_FUNCTIONAL_ASSESSMENT: ACTIVITIES ARE NOT PREVENTED

## 2022-08-31 ASSESSMENT — PAIN DESCRIPTION - DESCRIPTORS: DESCRIPTORS: SORE

## 2022-08-31 NOTE — PLAN OF CARE
Problem: Discharge Planning  Goal: Discharge to home or other facility with appropriate resources  8/31/2022 1654 by Johan Murphy RN  Outcome: Completed     Problem: ABCDS Injury Assessment  Goal: Absence of physical injury  8/31/2022 1654 by Johan Murphy RN  Outcome: Completed     Problem: Safety - Adult  Goal: Free from fall injury  8/31/2022 1654 by Johna Murphy RN  Outcome: Completed     Problem: Respiratory - Adult  Goal: Achieves optimal ventilation and oxygenation  8/31/2022 1654 by Johan Murphy RN  Outcome: Completed     Problem: Cardiovascular - Adult  Goal: Absence of cardiac dysrhythmias or at baseline  8/31/2022 1654 by Johan Murphy RN  Outcome: Completed

## 2022-08-31 NOTE — PLAN OF CARE
Problem: Discharge Planning  Goal: Discharge to home or other facility with appropriate resources  Outcome: Progressing     Problem: ABCDS Injury Assessment  Goal: Absence of physical injury  Outcome: Progressing  Flowsheets  Taken 8/30/2022 2000 by Tegan Barth RN  Absence of Physical Injury: Implement safety measures based on patient assessment    Problem: Safety - Adult  Goal: Free from fall injury  8/31/2022 0305 by Tegan Barth RN  Outcome: Progressing  Flowsheets (Taken 8/30/2022 2000)  Free From Fall Injury: Instruct family/caregiver on patient safety  Note: Call light within reach, bed in lowest position. Problem: Respiratory - Adult  Goal: Achieves optimal ventilation and oxygenation  Outcome: Progressing  Flowsheets (Taken 8/31/2022 0305)  Achieves optimal ventilation and oxygenation:   Assess for changes in respiratory status   Assess for changes in mentation and behavior  Note: Pt on 2L of oxygen for comfort during the night. Problem: Cardiovascular - Adult  Goal: Absence of cardiac dysrhythmias or at baseline  8/31/2022 0305 by Tegan Barth RN  Outcome: Progressing  Flowsheets (Taken 8/31/2022 0305)  Absence of cardiac dysrhythmias or at baseline:   Monitor cardiac rate and rhythm   Assess for signs of decreased cardiac output   Administer antiarrhythmia medication and electrolyte replacement as ordered  Note: Patient on V-paced on telemetry. HR in the 60's.

## 2022-08-31 NOTE — ANESTHESIA PRE PROCEDURE
Department of Anesthesiology  Preprocedure Note       Name:  Casandra Saunders   Age:  81 y.o.  :  1950                                          MRN:  [id number]         Date:  2022      Surgeon: * No surgeons listed *    Procedure: * No procedures listed *    Medications prior to admission:   Prior to Admission medications    Medication Sig Start Date End Date Taking? Authorizing Provider   Cholecalciferol (VITAMIN D3) 50 MCG (2000) CAPS Take by mouth daily   Yes Historical Provider, MD   spironolactone (ALDACTONE) 25 MG tablet Take 1 tablet by mouth in the morning. 8/10/22   NELA Cunningham - CNP   atorvastatin (LIPITOR) 10 MG tablet Take 1 tablet by mouth in the morning. 8/10/22   NELA Cunningham - CNP   ferrous sulfate 325 (65 Fe) MG tablet Take 325 mg by mouth    Historical Provider, MD   amLODIPine (NORVASC) 10 MG tablet Take 10 mg by mouth daily.       Historical Provider, MD       Current medications:    Current Facility-Administered Medications   Medication Dose Route Frequency Provider Last Rate Last Admin    ceFAZolin (ANCEF) 1,000 mg in sodium chloride 0.9 % 500 mL irrigation  1 g Irrigation Once Lanning Severance, MD        sodium chloride flush 0.9 % injection 5-40 mL  5-40 mL IntraVENous 2 times per day Dhiraj Pacheco APRN - CNP   10 mL at 22    sodium chloride flush 0.9 % injection 5-40 mL  5-40 mL IntraVENous PRN Dhiraj Pacheco APRN - CNP        0.9 % sodium chloride infusion   IntraVENous PRN Dhiraj Pacheco APRN - CNP        amLODIPine (NORVASC) tablet 10 mg  10 mg Oral Daily Trecole Pacheco APRN - CNP   10 mg at 22 9656    sodium chloride flush 0.9 % injection 5-40 mL  5-40 mL IntraVENous 2 times per day Lanning Severance, MD   10 mL at 22 0852    sodium chloride flush 0.9 % injection 5-40 mL  5-40 mL IntraVENous PRN Lanning Severance, MD        0.9 % sodium chloride infusion   IntraVENous PRN Lanning Severance, MD  sodium chloride flush 0.9 % injection 5-40 mL  5-40 mL IntraVENous 2 times per day Inna Shannon MD   10 mL at 08/29/22 2127    sodium chloride flush 0.9 % injection 5-40 mL  5-40 mL IntraVENous PRN Inna Shannon MD        0.9 % sodium chloride infusion   IntraVENous PRN Inna Shannon MD        [Held by provider] enoxaparin (LOVENOX) injection 40 mg  40 mg SubCUTAneous Daily Inna Shannon MD   40 mg at 08/29/22 2026    ondansetron (ZOFRAN-ODT) disintegrating tablet 4 mg  4 mg Oral Q8H PRN Inna Shannon MD        Or    ondansetron [facility]) injection 4 mg  4 mg IntraVENous Q6H PRN Inna Shannon MD        polyethylene glycol (GLYCOLAX) packet 17 g  17 g Oral Daily PRN Inna Shannon MD        acetaminophen (TYLENOL) tablet 650 mg  650 mg Oral Q6H PRN Inna Shannon MD        Or   Ethlyn Yulia acetaminophen (TYLENOL) suppository 650 mg  650 mg Rectal Q6H PRN Inna Shannon MD           Allergies:  No Known Allergies    Problem List:    Patient Active Problem List   Diagnosis Code    Abnormal EKG R94.31    Other hyperlipidemia E78.49    Bradycardia R00.1    GERD without esophagitis K21.9    Hypertension, essential I10    Obstructive sleep apnea (adult) (pediatric) G47.33    Syncope R55    Class 1 obesity due to excess calories with serious comorbidity and body mass index (BMI) of 30.0 to 30.9 in adult E66.09, Z68.30    Arthritis of left hip M16.12    Complete heart block (HCC) I44.2       Past Medical History:        Diagnosis Date    Anesthesia     high blood pressure after surgery    Cancer ([facility] [address]     COLON    DVT (deep venous thrombosis) ([facility] [address]     GERD without esophagitis 5/2/2019    Hypertension     Hypertension, essential 5/2/2019    Obstructive sleep apnea (adult) (pediatric)        Past Surgical History:        Procedure Laterality Date    CATARACT REMOVAL WITH IMPLANT  08/14/2012    phaco left eye/ IOL    CATARACT REMOVAL WITH IMPLANT  8/28/2012    Right     COLONOSCOPY  HERNIA REPAIR       TERI    SHOULDER SURGERY      LEFT    SHOULDER SURGERY  12-26-13    BILATERAL LEVATOR ADVANCEMENT, EXCISION OF MULTIPLE NEVIS ON    SMALL INTESTINE SURGERY      TOTAL HIP ARTHROPLASTY Left 4/12/2022    LEFT TOTAL HIP REPLACEMENT DIRECT ANTERIOR - DEPUY ADVANCED performed by Rachel Simon MD at [address] [facility]  04/19/2017    UPPER GASTROINTESTINAL ENDOSCOPY  06/06/2018    EUS, gastric nodule biopsy       Social History:    Social History     Tobacco Use    Smoking status: Never    Smokeless tobacco: Never   Substance Use Topics    Alcohol use: No                                Counseling given: Not Answered      Vital Signs (Current):   Vitals:    08/31/22 0400 08/31/22 0500 08/31/22 0600 08/31/22 0830   BP:  (!) 150/86  (!) 133/98   Pulse: 60  60 60   Resp:    16   Temp:    98 °F (36.7 °C)   TempSrc:    Oral   SpO2:    98%   Weight:  212 lb 1.3 oz (96.2 kg)     Height:                                                  BP Readings from Last 3 Encounters:   08/31/22 (!) 133/98   08/29/22 130/60   08/10/22 130/66       NPO Status:                                                                                 BMI:   Wt Readings from Last 3 Encounters:   08/31/22 212 lb 1.3 oz (96.2 kg)   08/29/22 214 lb (97.1 kg)   08/10/22 211 lb 6.4 oz (95.9 kg)     Body mass index is 31.32 kg/m².     CBC:   Lab Results   Component Value Date/Time    WBC 7.8 08/30/2022 06:32 AM    RBC 4.64 08/30/2022 06:32 AM    HGB 12.7 08/30/2022 06:32 AM    HCT 39.4 08/30/2022 06:32 AM    MCV 85.0 08/30/2022 06:32 AM    RDW 14.3 08/30/2022 06:32 AM     08/30/2022 06:32 AM       CMP:   Lab Results   Component Value Date/Time     08/30/2022 06:32 AM    K 4.4 08/30/2022 06:32 AM     08/30/2022 06:32 AM    CO2 24 08/30/2022 06:32 AM    BUN 22 08/30/2022 06:32 AM    CREATININE 1.4 08/30/2022 06:32 AM    GFRAA >60 08/30/2022 06:32 AM    AGRATIO 1.4 08/30/2022 06:32 AM LABGLOM 50 08/30/2022 06:32 AM    GLUCOSE 92 08/30/2022 06:32 AM    PROT 6.7 08/30/2022 06:32 AM    CALCIUM 8.8 08/30/2022 06:32 AM    BILITOT 0.7 08/30/2022 06:32 AM    ALKPHOS 99 08/30/2022 06:32 AM    AST 26 08/30/2022 06:32 AM    ALT 40 08/30/2022 06:32 AM       POC Tests: No results for input(s): POCGLU, POCNA, POCK, POCCL, POCBUN, POCHEMO, POCHCT in the last 72 hours. Coags:   Lab Results   Component Value Date/Time    PROTIME 14.8 08/31/2022 09:18 AM    INR 1.17 08/31/2022 09:18 AM    APTT 38.1 04/12/2022 08:39 AM       HCG (If Applicable): No results found for: PREGTESTUR, PREGSERUM, HCG, HCGQUANT     ABGs: No results found for: PHART, PO2ART, EMV1FEC, DON2GMS, BEART, E1CUZHDB     Type & Screen (If Applicable):  No results found for: LABABO, LABRH    Drug/Infectious Status (If Applicable):  No results found for: HIV, HEPCAB    COVID-19 Screening (If Applicable): No results found for: COVID19        Anesthesia Evaluation  Patient summary reviewed and Nursing notes reviewed history of anesthetic complications:   Airway: Mallampati: II  TM distance: >3 FB   Neck ROM: full  Mouth opening: > = 3 FB   Dental:          Pulmonary:   (+) sleep apnea:                             Cardiovascular:    (+) hypertension:, pacemaker: pacemaker,                   Neuro/Psych:               GI/Hepatic/Renal:             Endo/Other:    (+) : arthritis:., malignancy/cancer (Colon CA). Abdominal:             Vascular:   + DVT, . Other Findings:           Anesthesia Plan      general     ASA 1    (80-year-old male presents for revision of pacemaker lead. Plan general anesthesia with ASA standard monitors. Questions answered. Patient agreeable with anesthetic plan.  )  Induction: intravenous. Anesthetic plan and risks discussed with patient. Plan discussed with CRNA.     Attending anesthesiologist reviewed and agrees with Tadeo Wiley MD   8/31/2022

## 2022-08-31 NOTE — PROGRESS NOTES
Patient transported to [facility] for discharge. IV removed with no complications and discharge instructions provided to patient.  Electronically signed by Dayron Tan RN on 8/31/2022 at 6:25 PM

## 2022-08-31 NOTE — PROGRESS NOTES
The [address] [place] [address]  Cardiology Daily Progress Note  8/31/2022,9:21 AM      Shannan Sandoval MD ,Kaley Chan MD  Primary cardiologist:    Admit Date:  8/29/2022  Hospital Day: Hospital Day: 3  Subjective:  Mr. Peyton Narayan is awake and alert. States that he feels great today. Much much better. He is ventricular paced at 60 bpm.  Apparently the atrial leads have dislodged and going to be repositioned today. Otherwise he is great and feels great and his hemodynamics are stable. Objective:   BP (!) 133/98   Pulse 60   Temp 98 °F (36.7 °C) (Oral)   Resp 16   Ht 5' 9\" (1.753 m)   Wt 212 lb 1.3 oz (96.2 kg)   SpO2 98%   BMI 31.32 kg/m²     Intake/Output Summary (Last 24 hours) at 8/31/2022 0921  Last data filed at 8/31/2022 0351  Gross per 24 hour   Intake 240 ml   Output 0 ml   Net 240 ml       TELEMETRY: Ventricular paced at 60/min. Physical Examination:    Vitals:    08/31/22 0400 08/31/22 0500 08/31/22 0600 08/31/22 0830   BP:  (!) 150/86  (!) 133/98   Pulse: 60  60 60   Resp:    16   Temp:    98 °F (36.7 °C)   TempSrc:    Oral   SpO2:    98%   Weight:  212 lb 1.3 oz (96.2 kg)     Height:            Constitutional and General Appearance:  Healthy. And alert . HEENT: eyes and ears intact. No nasal masses  THYROID: not enlarged  LUNGS:  Clear to auscultation and percussion  HEART and VASCULAR:  The apical impulses not displaced  Heart tones are crisp and normal  Cervical veins are not engorged  The carotid upstroke is normal in amplitude and contour without delay or bruit  Peripheral pulses are symmetrical and full  There is no clubbing, cyanosis of the extremities. No peripheral edema  Femoral Arteries: 2+ and equal  Pedal Pulses: 2+ and equal   ABDOMEN[de-identified]  No masses or tenderness  Liver/Spleen: No Abnormalities Noted  NEUROLOGICAL:  . Moves all extremities to command. Cranial nerves 2-12 are in tact.   PSYCHIATRIC: alert and lucid and oriented and appropriate  SKIN: No lesions or rashes  LYMPH NODES: none enlarged      Medications:    sodium chloride flush  5-40 mL IntraVENous 2 times per day    amLODIPine  10 mg Oral Daily    sodium chloride flush  5-40 mL IntraVENous 2 times per day    sodium chloride flush  5-40 mL IntraVENous 2 times per day    [Held by provider] enoxaparin  40 mg SubCUTAneous Daily      sodium chloride      sodium chloride      sodium chloride       sodium chloride flush, sodium chloride, sodium chloride flush, sodium chloride, sodium chloride flush, sodium chloride, ondansetron **OR** ondansetron, polyethylene glycol, acetaminophen **OR** acetaminophen    Lab Data:  CBC:   Recent Labs     08/30/22  0632   WBC 7.8   HGB 12.7*   HCT 39.4*   MCV 85.0        BMP:   Recent Labs     08/30/22  0632      K 4.4      CO2 24   BUN 22*   CREATININE 1.4*     Troponin:Troponin: No results found for: TROPONINI  LIVER PROFILE:   Recent Labs     08/30/22  0632   AST 26   ALT 40   BILITOT 0.7   ALKPHOS 99     PT/INR:   Recent Labs     08/30/22 0632   PROTIME 14.7*   INR 1.16*     APTT: No results for input(s): APTT in the last 72 hours. BNP:  No results for input(s): BNP in the last 72 hours. IMAGING: as noted    Assessment:  Patient Active Problem List    Diagnosis Date Noted    Complete heart block ([facility] [address] 08/29/2022    Arthritis of left hip 01/13/2022    Class 1 obesity due to excess calories with serious comorbidity and body mass index (BMI) of 30.0 to 30.9 in adult 09/09/2019    Syncope     Obstructive sleep apnea (adult) (pediatric)     GERD without esophagitis 05/02/2019    Hypertension, essential 05/02/2019    Bradycardia 04/10/2019    Abnormal EKG 03/06/2019    Other hyperlipidemia 03/06/2019       Plan:   Continue current medications. Core Measures:  Discharge instructions:   LVEF documented:   ACEI for LV dysfunction:   Maker implanted yesterday. Atrial lead apparently dislodged.   Going to have repositioning of the atrial lead today but he feels great with ventricular pacing at 60/min. Thanks for allowing me  the opportunity to participate in the evaluation and care of your patient.  If there are questions please call me [phone]    This note was likely completed using voice recognition technology and may contain unintended grammatical, phraseology and/or punctuation  errors      Leydi Wallace MD ,[facility]  8/31/2022 9:21 AM

## 2022-08-31 NOTE — DISCHARGE INSTR - COC
Continuity of Care Form    Patient Name: Milena Garrett   :  1950  MRN:  [id number]    Admit date:  2022  Discharge date:  2022    Code Status Order: Full Code   Advance Directives:     Admitting Physician:  No admitting provider for patient encounter.   PCP: Julián Sierra MD    Discharging Nurse: Oliverio Ritter [facility] Unit/Room#: 8727/4395-39  Discharging Unit Phone Number: [phone]    Emergency Contact:   Extended Emergency Contact Information  Primary Emergency Contact: Yadira Alanis  Address: [address] Phone: [phone]  Mobile Phone: [phone]  Relation: Spouse

## 2022-08-31 NOTE — PLAN OF CARE
Problem: Discharge Planning  Goal: Discharge to home or other facility with appropriate resources  8/31/2022 1053 by Travis Garcia RN  Outcome: Progressing  Flowsheets (Taken 8/31/2022 1053)  Discharge to home or other facility with appropriate resources:   Identify barriers to discharge with patient and caregiver   Arrange for needed discharge resources and transportation as appropriate   Identify discharge learning needs (meds, wound care, etc)   Arrange for interpreters to assist at discharge as needed   Refer to discharge planning if patient needs post-hospital services based on physician order or complex needs related to functional status, cognitive ability or social support system     Problem: ABCDS Injury Assessment  Goal: Absence of physical injury  8/31/2022 1053 by Travis Garcia RN  Outcome: Progressing  Flowsheets (Taken 8/31/2022 1053)  Absence of Physical Injury: Implement safety measures based on patient assessment     Problem: Safety - Adult  Goal: Free from fall injury  8/31/2022 1053 by Travis Garcia RN  Outcome: Progressing  Flowsheets (Taken 8/31/2022 1053)  Free From Fall Injury: Instruct family/caregiver on patient safety     Problem: Respiratory - Adult  Goal: Achieves optimal ventilation and oxygenation  8/31/2022 1053 by Travis Garcia RN  Outcome: Progressing  Flowsheets (Taken 8/31/2022 1053)  Achieves optimal ventilation and oxygenation:   Assess for changes in respiratory status   Assess for changes in mentation and behavior   Position to facilitate oxygenation and minimize respiratory effort   Assess and instruct to report shortness of breath or any respiratory difficulty     Problem: Cardiovascular - Adult  Goal: Absence of cardiac dysrhythmias or at baseline  8/31/2022 1053 by Travis Garcia RN  Outcome: Progressing  Flowsheets (Taken 8/31/2022 1053)  Absence of cardiac dysrhythmias or at baseline:   Monitor cardiac rate and rhythm   Assess for signs of decreased cardiac output

## 2022-08-31 NOTE — ANESTHESIA POSTPROCEDURE EVALUATION
Department of Anesthesiology  Postprocedure Note    Patient: Nancy Petit  MRN: [id number]  YOB: 1950  Date of evaluation: 8/31/2022      Procedure Summary     Date: 08/31/22 Room / Location: [facility] Cath Lab    Anesthesia Start: 4563 Anesthesia Stop: 1180    Procedure: CATH LAB WITH ANESTHESIA Diagnosis:     Scheduled Providers: Annie Crawley MD; NELA Kunz - CRNA Responsible Provider: Annie Crawley MD    Anesthesia Type: general ASA Status: 3          Anesthesia Type: No value filed.     Nadeen Phase I:      Nadeen Phase II:        Anesthesia Post Evaluation    Patient location during evaluation: PACU  Patient participation: complete - patient participated  Level of consciousness: awake and alert  Pain score: 0  Airway patency: patent  Nausea & Vomiting: no nausea and no vomiting  Complications: no  Cardiovascular status: hemodynamically stable  Respiratory status: acceptable  Hydration status: euvolemic

## 2022-08-31 NOTE — DISCHARGE SUMMARY
EP Discharge Summary  [facility] ReedMD Audrey hoffman MD Judee Casco [place], [place]  8/31/22    Patient :Monserrat Stallings  Room/Bed: 3637/8484-20  Medical Record: [id number]  YOB: 1950    Admit date: 8/29/2022  Discharge date and time: 08/31/22     Admitting Physician: No admitting provider for patient encounter. Discharge Physician: No admitting provider for patient encounter. Admission Diagnoses: Complete heart block ([facility] [address] [I44.2]  Discharge Diagnoses: CHB    Discharged Condition: stable    Hospital Course: Monserrat Stallings Is a 81 y.o. man with PMH HTN, HLD, MC, GERD who saw Dr. Mumtaz Rojas in office, found to be in [place] [address] and admitted to hospital, s/p dual ch MDT PPM (8/30/22, Dr. Kristin Hsu), CXR post procedure showed atrial lead dislodged. He underwent atrial lead revision with Dr. Kristin Hsu (8/31/22). CXR showed stable placement of leads. Device check shows stable parameters. Left chest incision site CDI. Reviewed post procedure wound care instructions and activity limitations with patient and wife at length at bedside, also placed in chart. Follow up appts are scheduled and in chart. Pt dc'd home with wife. Consults: none    Significant Diagnostic Studies: labs: see chart and radiology: CXR:  see chart    Discharge Medications:   Current Discharge Medication List        CONTINUE these medications which have NOT CHANGED    Details   Cholecalciferol (VITAMIN D3) 50 MCG (2000 UT) CAPS Take by mouth daily      spironolactone (ALDACTONE) 25 MG tablet Take 1 tablet by mouth in the morning. Qty: 90 tablet, Refills: 1      atorvastatin (LIPITOR) 10 MG tablet Take 1 tablet by mouth in the morning. Qty: 90 tablet, Refills: 3      ferrous sulfate 325 (65 Fe) MG tablet Take 325 mg by mouth      amLODIPine (NORVASC) 10 MG tablet Take 10 mg by mouth daily.                Discharge Exam:  Carotid Arteries: normal pulsation bilaterally  Lungs: clear to auscultation without wheezes or rales  Heart: regular rate and rhythm, S1, S2 normal, no murmur, click, rub or gallop  Extremities: negative  Incision: L chest incision CDI    Disposition: home    Patient Instructions: Activity: See discharge instructions,  Diet: cardiac diet. Wound Care: See discharge instructions. Follow-up with NP at scheduled appointment. Call [phone] with any questions. Signed:  NELA Booth CNP  8/31/2022  3:44 PM    Greater than 35 minutes total spent on discharge.

## 2022-09-01 PROBLEM — Z95.0 PACEMAKER: Status: ACTIVE | Noted: 2022-08-30

## 2022-09-01 NOTE — PROGRESS NOTES
Physician Progress Note      Jaymie Reed  CSN #:                  [id number]  :                       1950  ADMIT DATE:       2022 6:16 PM  Rishabh Hays DATE:        2022 6:24 PM  RESPONDING  PROVIDER #:        Олег Cook          QUERY TEXT:    Pt admitted with CHB. Pt noted to have combined CHF in July office visit. If   possible, please document in progress notes and discharge summary if you are   evaluating and/or treating any of the following: The medical record reflects the following:  Risk Factors: HTN  Clinical Indicators: 2021 ECHO: EF: 60-65%, G2DD, moderate concentric   left ventricular hypertrophy. Per PCP office visit 2022 PMH: combined   systolic and diastolic CHF. Treatment: Home Norvasc continued, Aldactone held currently  Options provided:  -- Chronic Diastolic CHF/HFpEF  -- Chronic Systolic CHF/HFrEF  -- Chronic Systolic and Diastolic CHF  -- Other - I will add my own diagnosis  -- Disagree - Not applicable / Not valid  -- Disagree - Clinically unable to determine / Unknown  -- Refer to Clinical Documentation Reviewer    PROVIDER RESPONSE TEXT:    This patient has chronic diastolic CHF/HFpEF. Query created by: Gilberto Sutherland on 2022 12:28 PM      QUERY TEXT:    Patient noted to have CKD 2/3a by Nephrology office visit in March. If   possible, please document in progress notes and discharge summary if you are   evaluating and/or treating any of the following: The medical record reflects the following:  Risk Factors: HTN  Clinical Indicators: \"CRI\" noted in Cards office visit day of admission. Per   Nephrology office visit March \"Pt has CKD 2/3a\".  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 Query created by:  Aby Flores on 8/31/2022 12:32 PM      Electronically signed by:  Merlyn Correa 9/1/2022 12:32 PM

## 2022-09-06 ENCOUNTER — NURSE ONLY (OUTPATIENT)
Dept: CARDIOLOGY CLINIC | Age: 72
End: 2022-09-06
Payer: MEDICARE

## 2022-09-06 DIAGNOSIS — R55 SYNCOPE, UNSPECIFIED SYNCOPE TYPE: ICD-10-CM

## 2022-09-06 DIAGNOSIS — R00.1 BRADYCARDIA: ICD-10-CM

## 2022-09-06 DIAGNOSIS — Z95.0 PACEMAKER: Primary | ICD-10-CM

## 2022-09-06 DIAGNOSIS — I44.2 CHB (COMPLETE HEART BLOCK) (HCC): ICD-10-CM

## 2022-09-06 DIAGNOSIS — R00.2 PALPITATION: Primary | ICD-10-CM

## 2022-09-06 PROCEDURE — 93280 PM DEVICE PROGR EVAL DUAL: CPT | Performed by: INTERNAL MEDICINE

## 2022-09-06 RX ORDER — CARVEDILOL 12.5 MG/1
12.5 TABLET ORAL 2 TIMES DAILY
Qty: 180 TABLET | Refills: 3 | Status: SHIPPED | OUTPATIENT
Start: 2022-09-06

## 2022-09-06 NOTE — PROGRESS NOTES
Patient comes in for a 1 week wound and programming evaluation on their Medtronic dual chamber pacemaker. Atrial lead revision 8.31.2022//UL    Applits Humza paired and connected. Discussed remote monitoring, communications and frequencies. Dressing removed from left chest device site. Incision is well approximated, CDI, SS remains w/old scant drainage. Reviewed post op wound care and restrictions. Pt informed to call office if he develops any fever, chills, redness, increased swelling or drainage from site. Pt voiced an understanding. All sensing and pacing parameters are within normal range. Battery life 11.5 years  Underlying Predominantly Dependent @ 40 bpm in VVI. AP 16.2%.  98.7%. AT/AF burden 0%  No episodes noted. AT/AF carelink alerts adjusted from 6 hrs to 1 hr and additional alerts turned On. Please see interrogation for more detail. Patient concerned about recent BPs. Requested NPWS. Patient placed back on Coreg. Instructed to monitor BPs and bring to 1 month f/u. Pt v/u. Patient will follow up in 1 month in office. We will continue to monitor remotely.

## 2022-09-07 PROCEDURE — 93248 EXT ECG>7D<15D REV&INTERPJ: CPT | Performed by: INTERNAL MEDICINE

## 2022-09-13 ENCOUNTER — OFFICE VISIT (OUTPATIENT)
Dept: CARDIOLOGY CLINIC | Age: 72
End: 2022-09-13
Payer: MEDICARE

## 2022-09-13 VITALS
DIASTOLIC BLOOD PRESSURE: 64 MMHG | HEIGHT: 69 IN | SYSTOLIC BLOOD PRESSURE: 136 MMHG | BODY MASS INDEX: 31.55 KG/M2 | WEIGHT: 213 LBS | HEART RATE: 72 BPM

## 2022-09-13 DIAGNOSIS — I44.2 CHB (COMPLETE HEART BLOCK) (HCC): ICD-10-CM

## 2022-09-13 DIAGNOSIS — E78.5 HYPERLIPIDEMIA LDL GOAL <70: Primary | ICD-10-CM

## 2022-09-13 DIAGNOSIS — R55 SYNCOPE, UNSPECIFIED SYNCOPE TYPE: ICD-10-CM

## 2022-09-13 PROCEDURE — 99024 POSTOP FOLLOW-UP VISIT: CPT | Performed by: INTERNAL MEDICINE

## 2022-09-13 PROCEDURE — 1123F ACP DISCUSS/DSCN MKR DOCD: CPT | Performed by: INTERNAL MEDICINE

## 2022-09-13 NOTE — PROGRESS NOTES
[address]  Office Visit           Isi Zepeda MD,  [facility]                      Cardiology           Melvin Jaime  1950 September 13, 2022    Primary Cardiologist: Destinee Buck     Patient is here today for here today for follow-up from his recent hospitalization and pacemaker implant. Apparently the 1 atrial lead had to be readjusted the following day. He is wounds are healing very nicely he looks great he feels much better and more energetic. The pacemaker is what he needs and he looks as if he is about 93% paced. Has a by 11.5 years. Otherwise stable. Coronavirus vaccine none yet. And still will not receive vaccine    Coronavirus infection September 2021    Obstructive sleep apnea on CPAP  Hypertension  Chronic renal failure creatinine 1.7  Pacemaker implant August 2022    blood clots both legs 9/21 on Eliquis          HLD optimal  GERD stable       complaint with meds   Discussed nutrition / sodium intake / fluid intake   Recommend activity as tolerated     Reviewed most recent test with pt. / stress nuclear study showing ejection fraction of 72% and was negative for ischemia       Review of Systems:  Constitutional: Denies  fatigue, weakness, night sweats or fever. HEENT: Denies new visual changes, ringing in ears, nosebleeds,nasal congestion  Respiratory: Denies new or change in SOB, PND, orthopnea or cough. Cardiovascular: see HPI  GI: Denies N/V, diarrhea, constipation, abdominal pain, change in bowel habits, melena or hematochezia  : Denies urinary frequency, urgency, incontinence, hematuria or dysuria. Skin: Denies rash, hives, or cyanosis  Musculoskeletal: Denies joint or muscle aches/pain  Neurological: Denies syncope or TIA-like symptoms.   Psychiatric: Denies anxiety, insomnia or depression     Past Medical History:   Diagnosis Date    Anesthesia     high blood pressure after surgery    Cancer ([facility] [address]     COLON    DVT (deep venous thrombosis) ([facility] [address] GERD without esophagitis 5/2/2019    Hypertension     Hypertension, essential 5/2/2019    Obstructive sleep apnea (adult) (pediatric)      Past Surgical History:   Procedure Laterality Date    CATARACT REMOVAL WITH IMPLANT  08/14/2012    phaco left eye/ IOL    CATARACT REMOVAL WITH IMPLANT  8/28/2012    Right     COLONOSCOPY      HERNIA REPAIR       TERI    SHOULDER SURGERY      LEFT    SHOULDER SURGERY  12-26-13    BILATERAL LEVATOR ADVANCEMENT, EXCISION OF MULTIPLE NEVIS ON    SMALL INTESTINE SURGERY      TOTAL HIP ARTHROPLASTY Left 4/12/2022    LEFT TOTAL HIP REPLACEMENT DIRECT ANTERIOR - DEPUY ADVANCED performed by Rosemarie Andrew MD at [address] [facility]  04/19/2017    UPPER GASTROINTESTINAL ENDOSCOPY  06/06/2018    EUS, gastric nodule biopsy     Family History   Problem Relation Age of Onset    High Blood Pressure Mother      Social History     Tobacco Use    Smoking status: Never    Smokeless tobacco: Never   Vaping Use    Vaping Use: Never used   Substance Use Topics    Alcohol use: No    Drug use: No       No Known Allergies  Current Outpatient Medications   Medication Sig Dispense Refill    carvedilol (COREG) 12.5 MG tablet Take 1 tablet by mouth 2 times daily 180 tablet 3    Cholecalciferol (VITAMIN D3) 50 MCG (2000 UT) CAPS Take by mouth daily      spironolactone (ALDACTONE) 25 MG tablet Take 1 tablet by mouth in the morning. 90 tablet 1    atorvastatin (LIPITOR) 10 MG tablet Take 1 tablet by mouth in the morning. 90 tablet 3    ferrous sulfate 325 (65 Fe) MG tablet Take 325 mg by mouth      amLODIPine (NORVASC) 10 MG tablet Take 10 mg by mouth daily. No current facility-administered medications for this visit.        Physical Exam:   /64   Pulse 72   Ht 5' 9\" (1.753 m)   Wt 213 lb (96.6 kg)   BMI 31.45 kg/m²   BP Readings from Last 3 Encounters:   09/13/22 136/64   08/31/22 (!) 159/102   08/29/22 130/60     Pulse Readings from Last 3 Encounters:   09/13/22 72 08/31/22 60   08/29/22 (!) 46     Wt Readings from Last 3 Encounters:   09/13/22 213 lb (96.6 kg)   08/31/22 212 lb 1.3 oz (96.2 kg)   08/29/22 214 lb (97.1 kg)     Constitutional: He is oriented to person, place, and time. He appears well-developed and well-nourished. In no acute distress. HEENT: Normocephalic and atraumatic. Sclerae anicteric. No xanthelasmas. Conjunctiva white, no subconjunctival hemorrhage   External inspection of ears nose teeth & gums   Eyes:PERRLA EOM's intact. Neck: Neck supple. No JVD present. Carotids without bruits. No mass and no thyromegaly present. No lymphadenopathy present. Cardiovascular: RRR, normal S1 and S2; no murmur/gallop or rub, PMI nondisplaced  Pulmonary/Chest: Effort normal.  Lungs clear to auscultation. Chest wall nontender  Abdominal: soft, nontender, nondistended. + bowel sounds; no organomegaly or bruits. Aorta normal  Extremities: No edema, cyanosis, or clubbing. Pulses are 2+ radial/carotid/dorsalis pedis bilaterally. Cap refill brisk. Neurological: No cranial nerve deficit. Psychiatric: He has a normal mood and affect. His speech is normal and behavior is normal.     Lab Review:   Lab Results   Component Value Date/Time    TRIG 76 08/25/2022 10:38 AM    HDL 47 08/25/2022 10:38 AM    LDLCALC 97 08/25/2022 10:38 AM    LABVLDL 15 08/25/2022 10:38 AM     Lab Results   Component Value Date/Time     08/30/2022 06:32 AM    K 4.4 08/30/2022 06:32 AM     08/30/2022 06:32 AM    CO2 24 08/30/2022 06:32 AM    BUN 22 08/30/2022 06:32 AM    CREATININE 1.4 08/30/2022 06:32 AM    GLUCOSE 92 08/30/2022 06:32 AM    CALCIUM 8.8 08/30/2022 06:32 AM      Lab Results   Component Value Date    WBC 7.8 08/30/2022    HGB 12.7 (L) 08/30/2022    HCT 39.4 (L) 08/30/2022    MCV 85.0 08/30/2022     08/30/2022   EKG on       7/15/21    No ischemic EKG changes with exercise. Less than 0.5 mm st    deviation with exercise.  Marked 1st degree av block and    intermittent 2nd degree av block, IA interval improves with    exercise. Summary 7/15/21   Left ventricular cavity size is normal with moderate concentric left   ventricular hypertrophy. Overall, left ventricular systolic function appears normal.   Ejection fraction is visually estimated to be 60-65%. No regional wall motion abnormalities are noted. Diastolic filling parameters suggests grade II diastolic dysfunction. Trivial mitral regurgitation. Mild tricuspid regurgitation with a PASP of 25 mmHg. EKG on 1/27/2022 sinus rhythm 70/min with prolonged first-degree AV block. May be nearly second-degree A-V block [place]. 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 Assessment:    MC  uses CPAP now & states feels better / no c/o cp SOB edema / pleasant / active      HTN   optimal   EKG abnormal 1st degree av block /stable   stress nuclear study showing ejection fraction of 72% and was negative for ischemia. EKG on 7/2/2020 sinus rhythm 71/min. First-degree AV block. 1 PVC. Nonspecific ST and T wave changes. 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 HLD   Optimal    GERD  Stable     Plan:  All stable at this time from a cardiac perspective. We will need to follow him in about 4 months. Continue current therapy. He will follow-up with EP regarding his pacemaker that was recently implanted. Juana Sawant MD, [facility]

## 2022-09-28 ENCOUNTER — OFFICE VISIT (OUTPATIENT)
Dept: CARDIOLOGY CLINIC | Age: 72
End: 2022-09-28
Payer: MEDICARE

## 2022-09-28 ENCOUNTER — NURSE ONLY (OUTPATIENT)
Dept: CARDIOLOGY CLINIC | Age: 72
End: 2022-09-28
Payer: MEDICARE

## 2022-09-28 VITALS
BODY MASS INDEX: 32.11 KG/M2 | DIASTOLIC BLOOD PRESSURE: 82 MMHG | SYSTOLIC BLOOD PRESSURE: 130 MMHG | HEIGHT: 69 IN | HEART RATE: 82 BPM | WEIGHT: 216.8 LBS

## 2022-09-28 DIAGNOSIS — Z95.0 PACEMAKER: Primary | ICD-10-CM

## 2022-09-28 DIAGNOSIS — R55 SYNCOPE, UNSPECIFIED SYNCOPE TYPE: ICD-10-CM

## 2022-09-28 DIAGNOSIS — R00.1 BRADYCARDIA: ICD-10-CM

## 2022-09-28 DIAGNOSIS — I44.2 CHB (COMPLETE HEART BLOCK) (HCC): Primary | ICD-10-CM

## 2022-09-28 DIAGNOSIS — Z95.0 PACEMAKER: ICD-10-CM

## 2022-09-28 DIAGNOSIS — I44.2 CHB (COMPLETE HEART BLOCK) (HCC): ICD-10-CM

## 2022-09-28 DIAGNOSIS — I10 BENIGN ESSENTIAL HTN: ICD-10-CM

## 2022-09-28 PROCEDURE — 99214 OFFICE O/P EST MOD 30 MIN: CPT | Performed by: NURSE PRACTITIONER

## 2022-09-28 PROCEDURE — 1123F ACP DISCUSS/DSCN MKR DOCD: CPT | Performed by: NURSE PRACTITIONER

## 2022-09-28 PROCEDURE — 93280 PM DEVICE PROGR EVAL DUAL: CPT | Performed by: INTERNAL MEDICINE

## 2022-09-28 NOTE — PROGRESS NOTES
[address]   Electrophysiology  Office Visit  Date: 9/28/2022    Chief Complaint   Patient presents with    Hypertension    Bradycardia    Device Check         Cardiac HX: Libertad Desai is a 81 y.o.  man with PMH HTN, HLD, MC, GERD who saw Dr. Han Loya in office, found to be in [facility] and admitted to hospital, s/p dual ch MDT PPM (8/30/22, Dr. Thee Maxwell), CXR showed atrial lead dropped post procedure, s/p atrial lead revision (8/31/22, Dr. Thee Maxwell)    Interval History/HPI: Patient is here for follow up for CHB, PPM mgmt, HTN. Patient was recently admitted to the hospital in August 2022 after found to be in complete heart block and Dr. Teresa Fam office. He underwent dual-chamber MDT PPM placement. Chest x-ray postprocedure showed atrial lead dislodgment and patient underwent atrial lead revision with Dr. Thee Maxwell on 8/31/2022. He states since procedure he has had more energy and has felt overall much better. His device check today shows  99.4%, AP 55.7%, no arrhythmias noted, EYAD 10.3 years. Pt denies palpitations, heart racing, dizziness, lightheadedness. No CP, SOB, PND, orthopnea. Occasionally has BLE edema however wears compression socks regularly. BP well controlled in the office. Complaining of his left second toe being swollen. States he was recently worked up at his primary care's office and started on a steroid. States that the foot x-ray showed arthritis however the pain and swelling have not improved. Home medications:   Current Outpatient Medications on File Prior to Visit   Medication Sig Dispense Refill    carvedilol (COREG) 12.5 MG tablet Take 1 tablet by mouth 2 times daily 180 tablet 3    Cholecalciferol (VITAMIN D3) 50 MCG (2000 UT) CAPS Take by mouth daily      spironolactone (ALDACTONE) 25 MG tablet Take 1 tablet by mouth in the morning. 90 tablet 1    atorvastatin (LIPITOR) 10 MG tablet Take 1 tablet by mouth in the morning.  90 tablet 3    ferrous sulfate 325 (65 Fe) MG tablet Take 325 mg by mouth      amLODIPine (NORVASC) 10 MG tablet Take 10 mg by mouth daily. No current facility-administered medications on file prior to visit. Past Medical History:   Diagnosis Date    Anesthesia     high blood pressure after surgery    Cancer ([facility]     COLON    DVT (deep venous thrombosis) (HCC)     GERD without esophagitis 5/2/2019    Hypertension     Hypertension, essential 5/2/2019    Obstructive sleep apnea (adult) (pediatric)         Past Surgical History:   Procedure Laterality Date    CATARACT REMOVAL WITH IMPLANT  08/14/2012    phaco left eye/ IOL    CATARACT REMOVAL WITH IMPLANT  8/28/2012    Right     COLONOSCOPY      HERNIA REPAIR       TERI    SHOULDER SURGERY      LEFT    SHOULDER SURGERY  12-26-13    BILATERAL LEVATOR ADVANCEMENT, EXCISION OF MULTIPLE NEVIS ON    SMALL INTESTINE SURGERY      TOTAL HIP ARTHROPLASTY Left 4/12/2022    LEFT TOTAL HIP REPLACEMENT DIRECT ANTERIOR - DEPUY ADVANCED performed by Yamileth Mac MD at [address]  04/19/2017    UPPER GASTROINTESTINAL ENDOSCOPY  06/06/2018    EUS, gastric nodule biopsy       Family History:  Reviewed. family history includes High Blood Pressure in his mother. Review of System:    Constitutional: No fevers, chills. Cardiovascular: No for chest pain, No for dyspnea on exertion, No for palpitations or No for loss of consciousness. No cough, hemoptysis, No for pleuritic pain, or phlebitis. Respiratory: No for cough or wheezing. No hematemesis. Gastrointestinal: No blood in stools. Genitourinary: No hematuria. Musculoskeletal: No gait disturbance,    Integumentary: No rash or pruritis. Psychiatric: No anxiety, or depression. Hem/Lymph: No abnormal bruising or bleeding.     Physical Examination:  Vitals:    09/28/22 1357   BP: 130/82   Pulse: 82         Wt Readings from Last 3 Encounters:   09/28/22 216 lb 12.8 oz (98.3 kg)   09/13/22 213 lb (96.6 kg)   08/31/22 212 lb 1.3 oz (96.2 kg)       Constitutional: Oriented. No distress. Head: Normocephalic and atraumatic. Neck: Neck supple. No rigidity. No JVD present. Cardiovascular: Normal rate, regular rhythm, S1&S2. Pulmonary/Chest: Bilateral respiratory sounds. No wheezes, No rhonchi. Musculoskeletal: No tenderness. No edema    Neurological: Alert and oriented. Cranial nerve appears intact, No Gross deficit   Skin: Skin is warm and dry. No rash noted. Psychiatric: Has a normal mood, affect and behavior     Labs:  Reviewed. No results for input(s): NA, K, CL, CO2, PHOS, BUN, CREATININE, CA in the last 72 hours. Invalid input(s):  TSH  No results for input(s): WBC, HGB, HCT, MCV, PLT in the last 72 hours. No results found for: CKTOTAL, CKMB, CKMBINDEX, TROPONINI  No results found for: BNP  Lab Results   Component Value Date/Time    PROTIME 14.8 08/31/2022 09:18 AM    PROTIME 14.7 08/30/2022 06:32 AM    PROTIME 14.2 04/12/2022 08:39 AM    INR 1.17 08/31/2022 09:18 AM    INR 1.16 08/30/2022 06:32 AM    INR 1.25 04/12/2022 08:39 AM     Lab Results   Component Value Date/Time    CHOL 159 08/25/2022 10:38 AM    HDL 47 08/25/2022 10:38 AM    TRIG 76 08/25/2022 10:38 AM       ECG: Personally reviewed: , HR 82, , QTc 458    ECHO:  7/2021   Summary   Left ventricular cavity size is normal with moderate concentric left   ventricular hypertrophy. Overall, left ventricular systolic function appears normal.   Ejection fraction is visually estimated to be 60-65%. No regional wall motion abnormalities are noted. Diastolic filling parameters suggests grade II diastolic dysfunction. Trivial mitral regurgitation. Mild tricuspid regurgitation with a PASP of 25 mmHg. Stress Test: 7/2021  Summary    The overall quality of the study is good. There is subdiaphragmatic    attenuation. Left ventricular volume is normal. The right ventricle is normal in size.         SPECT images demonstrate homogeneous tracer distribution throughout the    myocardium. There is normal isotope uptake at stress and rest. There is no    evidence of myocardial ischemia or scar. Gated Spect reveals normal    myocardial thickening and wall motion. Sum stress score of 0. No visual TID. Calculated TID. Left ventricular ejection of 62%. Myocardial perfusion is normal. Low risk scan         Cardiac Angiography: n/a    Problem List:   Patient Active Problem List    Diagnosis Date Noted    Medtronic dual chamber pacemaker 08/30/2022    CHB (complete heart block) ([address] 08/29/2022    Arthritis of left hip 01/13/2022    Class 1 obesity due to excess calories with serious comorbidity and body mass index (BMI) of 30.0 to 30.9 in adult 09/09/2019    Syncope     Obstructive sleep apnea (adult) (pediatric)     GERD without esophagitis 05/02/2019    Benign essential HTN 05/02/2019    Bradycardia 04/10/2019    Abnormal EKG 03/06/2019    Other hyperlipidemia 03/06/2019        Assessment:   1. CHB (complete heart block) ([address]    2. Medtronic dual chamber pacemaker    3. Benign essential HTN        CHB  - S/p dual ch MDT PPM(8/30/22), s/p atrial lead revision (8/31/22)  - Device check shows  99.4%, AP 55.7%, no arrhythmias noted, EYAD 10.3 years. - L chest well-healed  - Reviewed activity limitations  - Follow-up 3 months  - ECG ordered and results personally reviewed     HTN  - BP well controlled  - On carvedilol 12.5 mg BID, amlodipine 10 mg QD, spironolactone    EF of 05-52%  No known systolic HF  No known CAD  No known AF  Tobacco use was discussed with the patient and education provided. All questions and concerns were addressed to the patient/family. Alternatives to my treatment were discussed. The note was completed using EMR. Every effort was made to ensure accuracy; however, inadvertent computerized transcription errors may be present.      Patient received education regarding their diagnosis, treatment and medications while in the office today.      Gonzalo Caballero, [facility]

## 2022-09-28 NOTE — PROGRESS NOTES
Patient comes in for a 1 month programming evaluation on their Medtronic dual chamber pacemaker. Hx: Atrial lead revision 8.31.22//UL    Incision appears to have healed nicely. All sensing and pacing parameters are within normal range. Battery life 10.3 years  Underlying V dependent VVI 35 bpm  AP 55.7%.  99.4%. AT/AF burden 0%  PVC 15.1/hr  No episodes noted. Patient remains on carvedilol. No changes need to be made at this time. Please see interrogation for more detail. Patient will see NPWS today and follow up in 3 months in office or remotely.

## 2022-10-25 ENCOUNTER — OFFICE VISIT (OUTPATIENT)
Dept: PULMONOLOGY | Age: 72
End: 2022-10-25
Payer: MEDICARE

## 2022-10-25 VITALS
HEART RATE: 83 BPM | HEIGHT: 69 IN | WEIGHT: 214 LBS | BODY MASS INDEX: 31.7 KG/M2 | OXYGEN SATURATION: 98 % | SYSTOLIC BLOOD PRESSURE: 138 MMHG | DIASTOLIC BLOOD PRESSURE: 82 MMHG

## 2022-10-25 DIAGNOSIS — E66.09 CLASS 1 OBESITY DUE TO EXCESS CALORIES WITH SERIOUS COMORBIDITY AND BODY MASS INDEX (BMI) OF 31.0 TO 31.9 IN ADULT: Chronic | ICD-10-CM

## 2022-10-25 DIAGNOSIS — G47.33 OBSTRUCTIVE SLEEP APNEA (ADULT) (PEDIATRIC): Chronic | ICD-10-CM

## 2022-10-25 DIAGNOSIS — K21.9 GERD WITHOUT ESOPHAGITIS: Chronic | ICD-10-CM

## 2022-10-25 DIAGNOSIS — I10 BENIGN ESSENTIAL HTN: Chronic | ICD-10-CM

## 2022-10-25 PROCEDURE — 99214 OFFICE O/P EST MOD 30 MIN: CPT | Performed by: INTERNAL MEDICINE

## 2022-10-25 PROCEDURE — 1123F ACP DISCUSS/DSCN MKR DOCD: CPT | Performed by: INTERNAL MEDICINE

## 2022-10-25 ASSESSMENT — SLEEP AND FATIGUE QUESTIONNAIRES
HOW LIKELY ARE YOU TO NOD OFF OR FALL ASLEEP WHILE SITTING INACTIVE IN A PUBLIC PLACE: 0
HOW LIKELY ARE YOU TO NOD OFF OR FALL ASLEEP WHILE SITTING QUIETLY AFTER LUNCH WITHOUT ALCOHOL: 0
HOW LIKELY ARE YOU TO NOD OFF OR FALL ASLEEP WHILE SITTING AND TALKING TO SOMEONE: 0
ESS TOTAL SCORE: 0
HOW LIKELY ARE YOU TO NOD OFF OR FALL ASLEEP WHILE LYING DOWN TO REST IN THE AFTERNOON WHEN CIRCUMSTANCES PERMIT: 0
HOW LIKELY ARE YOU TO NOD OFF OR FALL ASLEEP IN A CAR, WHILE STOPPED FOR A FEW MINUTES IN TRAFFIC: 0
HOW LIKELY ARE YOU TO NOD OFF OR FALL ASLEEP WHILE SITTING AND READING: 0
HOW LIKELY ARE YOU TO NOD OFF OR FALL ASLEEP WHILE WATCHING TV: 0
HOW LIKELY ARE YOU TO NOD OFF OR FALL ASLEEP WHEN YOU ARE A PASSENGER IN A CAR FOR AN HOUR WITHOUT A BREAK: 0

## 2022-10-25 NOTE — ASSESSMENT & PLAN NOTE
Chronic-not Stable: Reviewed and analyzed results of physiologic download from patient's machine and reviewed with patient. Supplies and parts as needed for his machine. These are medically necessary. Limit caffeine use after 3pm. Based on the analyzed data will change following settings: EPAPmin-7     Needs to work with his DME to try to get mask fit improved and get rid of the leak so we can accurately assess his AHI. If his current DME company will not work with them properly consider send prescription to new DME.

## 2022-10-25 NOTE — PROGRESS NOTES
[facility]  [address] [facility], [address] [facility]- [phone]   [facility] [address]. [address] [place] [address]. [address] [phone] [address] [facility]  [address] [place] [id number] [phone]      Assessment/Plan:      1. Obstructive sleep apnea (adult) (pediatric)  Assessment & Plan:  Chronic-not Stable: Reviewed and analyzed results of physiologic download from patient's machine and reviewed with patient. Supplies and parts as needed for his machine. These are medically necessary. Limit caffeine use after 3pm. Based on the analyzed data will change following settings: EPAPmin-7     Needs to work with his DME to try to get mask fit improved and get rid of the leak so we can accurately assess his AHI. If his current DME company will not work with them properly consider send prescription to new DME. 2. Benign essential HTN  Assessment & Plan:  Chronic- Stable. Discussed the importance of treating sleep apnea as part of the management of this disorder. Cont any meds per PCP and other physicians. 3. GERD without esophagitis  Assessment & Plan:  Chronic- Stable. Discussed the importance of treating sleep apnea as part of the management of this disorder. Cont any meds per PCP and other physicians. 4. Class 1 obesity due to excess calories with serious comorbidity and body mass index (BMI) of 31.0 to 31.9 in adult  Assessment & Plan:  Chronic-not stable:  Discussed importance of treating obstructive sleep apnea and getting sufficient sleep to assist with weight control. Encouraged him to work on weight loss through diet and exercise. Recommended DASH or Mediterranean diets. Reviewed, analyzed, and documented physiologic data from patient's PAP machine.     This information was analyzed to assess complexity and medical decision making in regards to further testing and management. Diagnoses of Obstructive sleep apnea (adult) (pediatric), Benign essential HTN, GERD without esophagitis, and Class 1 obesity due to excess calories with serious comorbidity and body mass index (BMI) of 31.0 to 31.9 in adult were pertinent to this visit. The chronic medical conditions listed are directly related to the primary diagnosis listed above. The management of the primary diagnosis affects the secondary diagnosis and vice versa. Subjective:   Subjective   Patient ID: Vi Delacruz is a 81 y.o. male. Chief Complaint   Patient presents with    Sleep Apnea       HPI:  Machine Modem/Download Info:  Compliance (hours/night): 6 hrs/night  % of nights >= 4 hrs: 96.7 %  Download AHI (/hour): 20.6 /HR  Average IPAP Pressure: 7 cmH2O  Average EPAP Pressure: 4 cmH2O   AUTO BIPAP - Settings (Lacy)  IPAP Max: 25 cmH2O  EPAP Min: 4 cmH2O  Pressure Support Min: 2  Pressure Support Max: 8             Comfort Settings  Humidity Level (0-8): 3  Heated Tubing (Yes/No): Yes  Flex/EPR (0-3): 3       Says he worked with his [employer] company but they kept him on the same mask. He has gotten replacement mask but has not used them yet. He does notice a leak at night but the download still shows a very high leak. He got his replaced machine but did not replace the modem so the new machine is set at default settings currently.     FClub    Chualar - Chualar Sleepiness Score: 0    Social History     Socioeconomic History    Marital status:      Spouse name: Not on file    Number of children: Not on file    Years of education: Not on file    Highest education level: Not on file   Occupational History    Not on file   Tobacco Use    Smoking status: Never    Smokeless tobacco: Never   Vaping Use    Vaping Use: Never used   Substance and Sexual Activity    Alcohol use: No    Drug use: No    Sexual activity: Not on file   Other Topics Concern    Not on file   Social History Narrative    Not on file     Social Determinants of Health     Financial Resource Strain: Not on file   Food Insecurity: Not on file   Transportation Needs: Not on file   Physical Activity: Not on file   Stress: Not on file   Social Connections: Not on file   Intimate Partner Violence: Not on file   Housing Stability: Not on file       Current Outpatient Medications   Medication Instructions    amLODIPine (NORVASC) 10 mg, DAILY    atorvastatin (LIPITOR) 10 mg, Oral, DAILY    carvedilol (COREG) 12.5 mg, Oral, 2 TIMES DAILY    Cholecalciferol (VITAMIN D3) 50 MCG (2000 UT) CAPS Oral, DAILY    ferrous sulfate (IRON 325) 325 mg, Oral    spironolactone (ALDACTONE) 25 mg, Oral, DAILY          Vitals:  Weight BMI   Wt Readings from Last 3 Encounters:   10/25/22 214 lb (97.1 kg)   10/18/22 217 lb 6.4 oz (98.6 kg)   09/28/22 216 lb 12.8 oz (98.3 kg)    Body mass index is 31.6 kg/m².      BP HR SaO2   BP Readings from Last 3 Encounters:   10/25/22 138/82   10/18/22 134/85   09/28/22 130/82    Pulse Readings from Last 3 Encounters:   10/25/22 83   10/18/22 78   09/28/22 82    SpO2 Readings from Last 3 Encounters:   10/25/22 98%   08/31/22 97%   04/12/22 97%        Electronically signed by Mart Schilder, MD on 10/25/2022 at 1:46 PM

## 2022-10-25 NOTE — PROGRESS NOTES
Ashkan Cano         : 1950    Diagnosis: [x] MC (G47.33) [] CSA (G47.31) [] Apnea (G47.30)   Length of Need: [x] 18 Months [] 99 Months [] Other:    Machine (MEGAN!): [] Respironics Dream Station      Auto [] ResMed AirSense     Auto [] Other:     []  CPAP () [] Bilevel ()   Mode: [] Auto [] Spontaneous    Mode: [] Auto [] Spontaneous              Comfort Settings:        Humidifier: [] Heated ()        [x] Water chamber replacement ()/ 1 per 6 months        Mask:   [] Nasal () /1 per 3 months [x] Full Face () /1 per 3 months   [] Patient choice -Size and fit mask [x] Patient Choice - Size and fit mask   [] Dispense:  [] Dispense:    [] Headgear () / 1 per 3 months [x] Headgear () / 1 per 3 months   [] Replacement Nasal Cushion ()/2 per month [x] Interface Replacement ()/1 per month   [] Replacement Nasal Pillows ()/2 per month         Tubing: [x] Heated ()/1 per 3 months    [] Standard ()/1 per 3 months [] Other:           Filters: [x] Non-disposable ()/1 per 6 months     [x] Ultra-Fine, Disposable ()/2 per month        Miscellaneous: [] Chin Strap ()/ 1 per 6 months [] O2 bleed-in:       LPM   [] Oximetry on CPAP/Bilevel []  Other:    [x] Modem: ()         Start Order Date: 10/25/22    MEDICAL JUSTIFICATION:  I, the undersigned, certify that the above prescribed supplies are medically necessary for this patients wellbeing. In my opinion, the supplies are both reasonable and necessary in reference to accepted standards of medicalpractice in treatment of this patients condition.     Alton Lamb MD      NPI: [id number]       Order Signed Date: 10/25/22    Electronically signed by Alton Lamb MD on 10/25/2022 at 1:45 PM    Ashkan Cano  1950  [address]  [phone] (home)   [phone] (mobile)      Insurance Info (confirm with patient if correct):  Payer/Plan Subscr  Sex Relation Sub.  Ins. ID Effective Group Num

## 2022-10-26 NOTE — RESULT ENCOUNTER NOTE
Unremarkable device check.    Continue to monitor    Carmin Spatz MD   Cardiac Electrophysiology  [address] [facility]  Office [phone]

## 2022-10-26 NOTE — RESULT ENCOUNTER NOTE
Unremarkable device check.    Continue to monitor    Bala Gresham MD   Cardiac Electrophysiology  [address] [facility]  Office [phone]

## 2022-10-26 NOTE — RESULT ENCOUNTER NOTE
Unremarkable device check.    Continue to monitor    Bala Gresham MD   Cardiac Electrophysiology  [facility]  Office [phone]

## 2022-10-26 NOTE — RESULT ENCOUNTER NOTE
Unremarkable device check.    Continue to monitor    Mara Gaytan MD   Cardiac Electrophysiology  [address] [facility]  Office [phone]

## 2022-10-26 NOTE — RESULT ENCOUNTER NOTE
Unremarkable device check.    Continue to monitor    Milagros Graves MD   Cardiac Electrophysiology  [address] [facility]  Office [phone]

## 2022-10-26 NOTE — RESULT ENCOUNTER NOTE
Unremarkable device check.    Continue to monitor    Webster Thomas GUERRA   Cardiac Electrophysiology  [address] [facility]  Office [phone]

## 2022-11-29 ENCOUNTER — NURSE ONLY (OUTPATIENT)
Dept: CARDIOLOGY CLINIC | Age: 72
End: 2022-11-29
Payer: MEDICARE

## 2022-11-29 DIAGNOSIS — Z95.0 PACEMAKER: ICD-10-CM

## 2022-11-29 DIAGNOSIS — I44.2 CHB (COMPLETE HEART BLOCK) (HCC): Primary | ICD-10-CM

## 2022-11-29 PROCEDURE — 93296 REM INTERROG EVL PM/IDS: CPT | Performed by: INTERNAL MEDICINE

## 2022-11-29 PROCEDURE — 93294 REM INTERROG EVL PM/LDLS PM: CPT | Performed by: INTERNAL MEDICINE

## 2022-11-29 NOTE — PROGRESS NOTES
Remote transmission received from patient's dual chamber pacemaker home monitor. Transmission shows normal sensing and pacing function. No new arrhythmias/ events recorded. EP will review. See interrogation under cardiology tab in the [address] [place] [address] [address] field for more details. Will continue to monitor remotely. (End of 91-day monitoring period 11/29/22).

## 2023-01-23 RX ORDER — SPIRONOLACTONE 25 MG/1
25 TABLET ORAL DAILY
Qty: 90 TABLET | Refills: 0 | Status: SHIPPED | OUTPATIENT
Start: 2023-01-23

## 2023-01-25 ENCOUNTER — TELEPHONE (OUTPATIENT)
Dept: CARDIOLOGY CLINIC | Age: 73
End: 2023-01-25

## 2023-01-25 NOTE — TELEPHONE ENCOUNTER
Left message for patient to call back to reschedule     Appointment missed from December with FW    Patient needs device and appointment with FW

## 2023-02-18 ENCOUNTER — HOSPITAL ENCOUNTER (OUTPATIENT)
Age: 73
Discharge: HOME | End: 2023-02-18
Payer: MEDICARE

## 2023-02-18 DIAGNOSIS — I50.9 CONGESTIVE HEART FAILURE, UNSPECIFIED HF CHRONICITY, UNSPECIFIED HEART FAILURE TYPE (HCC): ICD-10-CM

## 2023-02-18 LAB
ALBUMIN SERPL-MCNC: 4 G/DL (ref 3.4–5)
ANION GAP SERPL CALCULATED.3IONS-SCNC: 13 MMOL/L (ref 3–16)
BUN BLDV-MCNC: 20 MG/DL (ref 7–20)
CALCIUM SERPL-MCNC: 9.6 MG/DL (ref 8.3–10.6)
CHLORIDE BLD-SCNC: 103 MMOL/L (ref 99–110)
CO2: 24 MMOL/L (ref 21–32)
CREAT SERPL-MCNC: 1.5 MG/DL (ref 0.8–1.3)
GFR SERPL CREATININE-BSD FRML MDRD: 50 ML/MIN/{1.73_M2}
GLUCOSE BLD-MCNC: 90 MG/DL (ref 70–99)
PHOSPHORUS: 4 MG/DL (ref 2.5–4.9)
POTASSIUM SERPL-SCNC: 4 MMOL/L (ref 3.5–5.1)
PRO-BNP: 212 PG/ML (ref 0–124)
PROSTATE SPECIFIC ANTIGEN: 4.27 NG/ML (ref 0–4)
SODIUM BLD-SCNC: 140 MMOL/L (ref 136–145)

## 2023-02-18 PROCEDURE — 83880 ASSAY OF NATRIURETIC PEPTIDE: CPT

## 2023-02-18 PROCEDURE — 80069 RENAL FUNCTION PANEL: CPT

## 2023-02-18 PROCEDURE — 84153 ASSAY OF PSA TOTAL: CPT

## 2023-02-18 PROCEDURE — 36415 COLL VENOUS BLD VENIPUNCTURE: CPT

## 2023-03-07 ENCOUNTER — NURSE ONLY (OUTPATIENT)
Dept: CARDIOLOGY CLINIC | Age: 73
End: 2023-03-07

## 2023-03-07 DIAGNOSIS — R00.1 BRADYCARDIA: ICD-10-CM

## 2023-03-07 DIAGNOSIS — Z95.0 PACEMAKER: Primary | ICD-10-CM

## 2023-03-09 NOTE — PROGRESS NOTES
Remote transmission received from patient's dual chamber pacemaker home monitor. Transmission shows normal sensing and pacing function. No new arrhythmias/ events recorded. EP will review. See interrogation under cardiology tab in the [address] [place] [address] [address] field for more details. Will continue to monitor remotely.     (End of 91-day monitoring period 3/7/23)

## 2023-06-20 ENCOUNTER — NURSE ONLY (OUTPATIENT)
Dept: CARDIOLOGY CLINIC | Age: 73
End: 2023-06-20
Payer: MEDICARE

## 2023-06-20 DIAGNOSIS — I44.2 CHB (COMPLETE HEART BLOCK) (HCC): ICD-10-CM

## 2023-06-20 DIAGNOSIS — Z95.0 PACEMAKER: Primary | ICD-10-CM

## 2023-06-20 PROCEDURE — 93294 REM INTERROG EVL PM/LDLS PM: CPT | Performed by: INTERNAL MEDICINE

## 2023-06-20 PROCEDURE — 93296 REM INTERROG EVL PM/IDS: CPT | Performed by: INTERNAL MEDICINE

## 2023-07-12 NOTE — PROGRESS NOTES
Remote transmission received from patient's dual chamber pacemaker home monitor. Transmission shows normal sensing and pacing function. No new arrhythmias/ events recorded. EP will review. See interrogation under cardiology tab in the [address] field for more details. Will continue to monitor remotely.     (End of 91-day monitoring period 6/20/23)

## 2023-08-08 ENCOUNTER — HOSPITAL ENCOUNTER (OUTPATIENT)
Age: 73
Discharge: HOME | End: 2023-08-08
Payer: MEDICARE

## 2023-08-08 LAB
ALBUMIN SERPL-MCNC: 4.1 G/DL (ref 3.4–5)
ANION GAP SERPL CALCULATED.3IONS-SCNC: 13 MMOL/L (ref 3–16)
BUN SERPL-MCNC: 17 MG/DL (ref 7–20)
CALCIUM SERPL-MCNC: 9.3 MG/DL (ref 8.3–10.6)
CHLORIDE SERPL-SCNC: 105 MMOL/L (ref 99–110)
CO2 SERPL-SCNC: 24 MMOL/L (ref 21–32)
CREAT SERPL-MCNC: 1.7 MG/DL (ref 0.8–1.3)
GFR SERPLBLD CREATININE-BSD FMLA CKD-EPI: 43 ML/MIN/{1.73_M2}
GLUCOSE SERPL-MCNC: 105 MG/DL (ref 70–99)
PHOSPHATE SERPL-MCNC: 3.6 MG/DL (ref 2.5–4.9)
POTASSIUM SERPL-SCNC: 4.5 MMOL/L (ref 3.5–5.1)
PSA SERPL DL<=0.01 NG/ML-MCNC: 4.78 NG/ML (ref 0–4)
SODIUM SERPL-SCNC: 142 MMOL/L (ref 136–145)

## 2023-08-08 PROCEDURE — 36415 COLL VENOUS BLD VENIPUNCTURE: CPT

## 2023-08-08 PROCEDURE — 84153 ASSAY OF PSA TOTAL: CPT

## 2023-08-08 PROCEDURE — 80069 RENAL FUNCTION PANEL: CPT

## 2023-09-19 NOTE — TELEPHONE ENCOUNTER
Requested Prescriptions      No prescriptions requested or ordered in this encounter          Last Office Visit: 9/28/2022     Next Office Visit: 10/3/2023

## 2023-09-20 RX ORDER — CARVEDILOL 12.5 MG/1
12.5 TABLET ORAL 2 TIMES DAILY
Qty: 180 TABLET | Refills: 3 | OUTPATIENT
Start: 2023-09-20

## 2023-09-21 ENCOUNTER — TELEPHONE (OUTPATIENT)
Dept: CARDIOLOGY CLINIC | Age: 73
End: 2023-09-21

## 2023-09-21 RX ORDER — CARVEDILOL 12.5 MG/1
12.5 TABLET ORAL 2 TIMES DAILY
Qty: 180 TABLET | Refills: 2 | Status: SHIPPED | OUTPATIENT
Start: 2023-09-21

## 2023-09-21 NOTE — TELEPHONE ENCOUNTER
Requested Prescriptions     Pending Prescriptions Disp Refills    carvedilol (COREG) 12.5 MG tablet 180 tablet 3     Sig: Take 1 tablet by mouth 2 times daily          Number: 180    Refills: 3    Last Office Visit: 9/28/2022     Next Office Visit: 10/3/2023

## 2023-10-03 PROCEDURE — 93294 REM INTERROG EVL PM/LDLS PM: CPT | Performed by: INTERNAL MEDICINE

## 2023-10-03 PROCEDURE — 93296 REM INTERROG EVL PM/IDS: CPT | Performed by: INTERNAL MEDICINE

## 2023-11-27 RX ORDER — ATORVASTATIN CALCIUM 10 MG/1
10 TABLET, FILM COATED ORAL DAILY
Qty: 90 TABLET | Refills: 1 | Status: SHIPPED | OUTPATIENT
Start: 2023-11-27

## 2024-01-08 PROCEDURE — 93294 REM INTERROG EVL PM/LDLS PM: CPT | Performed by: INTERNAL MEDICINE

## 2024-01-08 PROCEDURE — 93296 REM INTERROG EVL PM/IDS: CPT | Performed by: INTERNAL MEDICINE

## 2024-01-22 ENCOUNTER — HOSPITAL ENCOUNTER (OUTPATIENT)
Age: 74
Discharge: HOME | End: 2024-01-22
Payer: MEDICARE

## 2024-01-22 DIAGNOSIS — N18.30 STAGE 3 CHRONIC KIDNEY DISEASE, UNSPECIFIED WHETHER STAGE 3A OR 3B CKD (HCC): ICD-10-CM

## 2024-01-22 LAB
ALBUMIN SERPL-MCNC: 4.1 G/DL (ref 3.4–5)
ALBUMIN/GLOB SERPL: 1.4 {RATIO} (ref 1.1–2.2)
ALP SERPL-CCNC: 106 U/L (ref 40–129)
ALT SERPL-CCNC: 26 U/L (ref 10–40)
ANION GAP SERPL CALCULATED.3IONS-SCNC: 8 MMOL/L (ref 3–16)
AST SERPL-CCNC: 23 U/L (ref 15–37)
BASOPHILS # BLD: 0 K/UL (ref 0–0.2)
BASOPHILS NFR BLD: 0.6 %
BILIRUB DIRECT SERPL-MCNC: <0.2 MG/DL (ref 0–0.3)
BILIRUB INDIRECT SERPL-MCNC: NORMAL MG/DL (ref 0–1)
BILIRUB SERPL-MCNC: 0.8 MG/DL (ref 0–1)
BUN SERPL-MCNC: 19 MG/DL (ref 7–20)
CALCIUM SERPL-MCNC: 8.8 MG/DL (ref 8.3–10.6)
CHLORIDE SERPL-SCNC: 107 MMOL/L (ref 99–110)
CHOLEST SERPL-MCNC: 135 MG/DL (ref 0–199)
CO2 SERPL-SCNC: 26 MMOL/L (ref 21–32)
CREAT SERPL-MCNC: 1.4 MG/DL (ref 0.8–1.3)
CREAT UR-MCNC: 43.3 MG/DL (ref 39–259)
CRP SERPL-MCNC: <3 MG/L (ref 0–5.1)
DEPRECATED RDW RBC AUTO: 13.8 % (ref 12.4–15.4)
EOSINOPHIL # BLD: 0.2 K/UL (ref 0–0.6)
EOSINOPHIL NFR BLD: 2.9 %
ERYTHROCYTE [SEDIMENTATION RATE] IN BLOOD BY WESTERGREN METHOD: 28 MM/HR (ref 0–20)
GFR SERPLBLD CREATININE-BSD FMLA CKD-EPI: 54 ML/MIN/{1.73_M2}
GLUCOSE SERPL-MCNC: 93 MG/DL (ref 70–99)
HCT VFR BLD AUTO: 39.8 % (ref 40.5–52.5)
HCV AB SERPL QL IA: NORMAL
HDLC SERPL-MCNC: 42 MG/DL (ref 40–60)
HGB BLD-MCNC: 13.5 G/DL (ref 13.5–17.5)
LDLC SERPL CALC-MCNC: 70 MG/DL
LYMPHOCYTES # BLD: 1.6 K/UL (ref 1–5.1)
LYMPHOCYTES NFR BLD: 25.4 %
MCH RBC QN AUTO: 29.4 PG (ref 26–34)
MCHC RBC AUTO-ENTMCNC: 34 G/DL (ref 31–36)
MCV RBC AUTO: 86.4 FL (ref 80–100)
MICROALBUMIN UR DL<=1MG/L-MCNC: <1.2 MG/DL
MICROALBUMIN/CREAT UR: NORMAL MG/G (ref 0–30)
MONOCYTES # BLD: 0.5 K/UL (ref 0–1.3)
MONOCYTES NFR BLD: 8.4 %
NEUTROPHILS # BLD: 4 K/UL (ref 1.7–7.7)
NEUTROPHILS NFR BLD: 62.7 %
PHOSPHATE SERPL-MCNC: 3.3 MG/DL (ref 2.5–4.9)
PLATELET # BLD AUTO: 157 K/UL (ref 135–450)
PMV BLD AUTO: 9.1 FL (ref 5–10.5)
POTASSIUM SERPL-SCNC: 4.3 MMOL/L (ref 3.5–5.1)
PROT SERPL-MCNC: 7.1 G/DL (ref 6.4–8.2)
PSA SERPL DL<=0.01 NG/ML-MCNC: 4.09 NG/ML (ref 0–4)
RBC # BLD AUTO: 4.6 M/UL (ref 4.2–5.9)
SODIUM SERPL-SCNC: 141 MMOL/L (ref 136–145)
TRIGL SERPL-MCNC: 115 MG/DL (ref 0–150)
URATE SERPL-MCNC: 9.2 MG/DL (ref 3.5–7.2)
VLDLC SERPL CALC-MCNC: 23 MG/DL
WBC # BLD AUTO: 6.3 K/UL (ref 4–11)

## 2024-01-22 PROCEDURE — 84153 ASSAY OF PSA TOTAL: CPT

## 2024-01-22 PROCEDURE — 82248 BILIRUBIN DIRECT: CPT

## 2024-01-22 PROCEDURE — 86140 C-REACTIVE PROTEIN: CPT

## 2024-01-22 PROCEDURE — 80053 COMPREHEN METABOLIC PANEL: CPT

## 2024-01-22 PROCEDURE — 85025 COMPLETE CBC W/AUTO DIFF WBC: CPT

## 2024-01-22 PROCEDURE — 85652 RBC SED RATE AUTOMATED: CPT

## 2024-01-22 PROCEDURE — 84100 ASSAY OF PHOSPHORUS: CPT

## 2024-01-22 PROCEDURE — 82043 UR ALBUMIN QUANTITATIVE: CPT

## 2024-01-22 PROCEDURE — 84550 ASSAY OF BLOOD/URIC ACID: CPT

## 2024-01-22 PROCEDURE — 80061 LIPID PANEL: CPT

## 2024-01-22 PROCEDURE — 36415 COLL VENOUS BLD VENIPUNCTURE: CPT

## 2024-01-22 PROCEDURE — 86803 HEPATITIS C AB TEST: CPT

## 2024-01-22 PROCEDURE — 82570 ASSAY OF URINE CREATININE: CPT

## 2024-06-13 RX ORDER — CARVEDILOL 12.5 MG/1
12.5 TABLET ORAL 2 TIMES DAILY
Qty: 180 TABLET | Refills: 0 | Status: SHIPPED | OUTPATIENT
Start: 2024-06-13

## 2024-06-24 ENCOUNTER — HOSPITAL ENCOUNTER (OUTPATIENT)
Age: 74
Discharge: HOME | End: 2024-06-24
Payer: MEDICARE

## 2024-06-24 DIAGNOSIS — N18.30 STAGE 3 CHRONIC KIDNEY DISEASE, UNSPECIFIED WHETHER STAGE 3A OR 3B CKD (HCC): ICD-10-CM

## 2024-06-24 LAB
ALBUMIN SERPL-MCNC: 4 G/DL (ref 3.4–5)
ANION GAP SERPL CALCULATED.3IONS-SCNC: 13 MMOL/L (ref 3–16)
BUN SERPL-MCNC: 22 MG/DL (ref 7–20)
CALCIUM SERPL-MCNC: 9.7 MG/DL (ref 8.3–10.6)
CHLORIDE SERPL-SCNC: 108 MMOL/L (ref 99–110)
CO2 SERPL-SCNC: 22 MMOL/L (ref 21–32)
CREAT SERPL-MCNC: 1.7 MG/DL (ref 0.8–1.3)
CREAT UR-MCNC: 162.2 MG/DL (ref 39–259)
GFR SERPLBLD CREATININE-BSD FMLA CKD-EPI: 42 ML/MIN/{1.73_M2}
GLUCOSE SERPL-MCNC: 92 MG/DL (ref 70–99)
MICROALBUMIN UR DL<=1MG/L-MCNC: 6.1 MG/DL
MICROALBUMIN/CREAT UR: 37.6 MG/G (ref 0–30)
PHOSPHATE SERPL-MCNC: 3.4 MG/DL (ref 2.5–4.9)
POTASSIUM SERPL-SCNC: 4.1 MMOL/L (ref 3.5–5.1)
PSA SERPL DL<=0.01 NG/ML-MCNC: 4.94 NG/ML (ref 0–4)
SODIUM SERPL-SCNC: 143 MMOL/L (ref 136–145)

## 2024-06-24 PROCEDURE — 84153 ASSAY OF PSA TOTAL: CPT

## 2024-06-24 PROCEDURE — 80069 RENAL FUNCTION PANEL: CPT

## 2024-06-24 PROCEDURE — 36415 COLL VENOUS BLD VENIPUNCTURE: CPT

## 2024-06-24 PROCEDURE — 82043 UR ALBUMIN QUANTITATIVE: CPT

## 2024-06-24 PROCEDURE — 82570 ASSAY OF URINE CREATININE: CPT

## 2024-10-10 ENCOUNTER — TELEPHONE (OUTPATIENT)
Dept: CARDIOLOGY CLINIC | Age: 74
End: 2024-10-10

## 2024-10-15 ENCOUNTER — OFFICE VISIT (OUTPATIENT)
Dept: CARDIOLOGY CLINIC | Age: 74
End: 2024-10-15
Payer: MEDICARE

## 2024-10-15 VITALS
SYSTOLIC BLOOD PRESSURE: 134 MMHG | HEART RATE: 75 BPM | BODY MASS INDEX: 33.18 KG/M2 | HEIGHT: 69 IN | DIASTOLIC BLOOD PRESSURE: 86 MMHG | WEIGHT: 224 LBS

## 2024-10-15 DIAGNOSIS — R07.9 CHEST PAIN, UNSPECIFIED TYPE: ICD-10-CM

## 2024-10-15 DIAGNOSIS — R00.1 BRADYCARDIA: ICD-10-CM

## 2024-10-15 DIAGNOSIS — Z00.00 ROUTINE CHECK-UP: Primary | ICD-10-CM

## 2024-10-15 PROCEDURE — 1123F ACP DISCUSS/DSCN MKR DOCD: CPT | Performed by: INTERNAL MEDICINE

## 2024-10-15 PROCEDURE — 93000 ELECTROCARDIOGRAM COMPLETE: CPT | Performed by: INTERNAL MEDICINE

## 2024-10-15 PROCEDURE — 99214 OFFICE O/P EST MOD 30 MIN: CPT | Performed by: INTERNAL MEDICINE

## 2024-10-15 PROCEDURE — 1159F MED LIST DOCD IN RCRD: CPT | Performed by: INTERNAL MEDICINE

## 2024-10-15 PROCEDURE — 3075F SYST BP GE 130 - 139MM HG: CPT | Performed by: INTERNAL MEDICINE

## 2024-10-15 PROCEDURE — 3079F DIAST BP 80-89 MM HG: CPT | Performed by: INTERNAL MEDICINE

## 2024-10-15 RX ORDER — TAMSULOSIN HYDROCHLORIDE 0.4 MG/1
0.4 CAPSULE ORAL EVERY EVENING
COMMUNITY
Start: 2024-08-23

## 2024-10-15 RX ORDER — LOSARTAN POTASSIUM 50 MG/1
50 TABLET ORAL DAILY
COMMUNITY
Start: 2024-10-01

## 2024-10-28 NOTE — PROGRESS NOTES
Physical Exam:   /86   Pulse 75   Ht 1.753 m (5' 9\")   Wt 101.6 kg (224 lb)   BMI 33.08 kg/m²   BP Readings from Last 3 Encounters:   10/15/24 134/86   08/06/24 138/85   02/06/24 135/88     Pulse Readings from Last 3 Encounters:   10/15/24 75   08/06/24 69   02/06/24 79     Wt Readings from Last 3 Encounters:   10/15/24 101.6 kg (224 lb)   08/06/24 100.9 kg (222 lb 6.4 oz)   02/06/24 103.1 kg (227 lb 6.4 oz)     Constitutional: He is oriented to person, place, and time. He appears well-developed and well-nourished. In no acute distress.   HEENT: Normocephalic and atraumatic.  Sclerae anicteric. No xanthelasmas. Conjunctiva white, no subconjunctival hemorrhage   External inspection of ears nose teeth & gums   Eyes:PERRLA EOM's intact.   Neck: Neck supple. No JVD present. Carotids without bruits. No mass and no thyromegaly present. No lymphadenopathy present.  Cardiovascular: RRR, normal S1 and S2; no murmur/gallop or rub, PMI nondisplaced  Pulmonary/Chest: Effort normal.  Lungs clear to auscultation. Chest wall nontender  Abdominal: soft, nontender, nondistended. + bowel sounds; no organomegaly or bruits. Aorta normal  Extremities: No edema, cyanosis, or clubbing. Pulses are 2+ radial/carotid/dorsalis pedis bilaterally. Cap refill brisk.  Neurological: No cranial nerve deficit.   Psychiatric: He has a normal mood and affect. His speech is normal and behavior is normal.     Lab Review:   Lab Results   Component Value Date/Time    TRIG 115 01/22/2024 08:55 AM    HDL 42 01/22/2024 08:55 AM     Lab Results   Component Value Date/Time     06/24/2024 11:42 AM    K 4.1 06/24/2024 11:42 AM    K 4.4 08/30/2022 06:32 AM     06/24/2024 11:42 AM    CO2 22 06/24/2024 11:42 AM    BUN 22 06/24/2024 11:42 AM    CREATININE 1.7 06/24/2024 11:42 AM    GLUCOSE 92 06/24/2024 11:42 AM    CALCIUM 9.7 06/24/2024 11:42 AM      Lab Results   Component Value Date    WBC 6.3 01/22/2024    HGB 13.5 01/22/2024    HCT

## 2024-11-18 RX ORDER — ATORVASTATIN CALCIUM 10 MG/1
10 TABLET, FILM COATED ORAL DAILY
Qty: 90 TABLET | Refills: 1 | Status: SHIPPED | OUTPATIENT
Start: 2024-11-18

## 2024-11-25 RX ORDER — CARVEDILOL 12.5 MG/1
12.5 TABLET ORAL 2 TIMES DAILY
Qty: 180 TABLET | Refills: 1 | Status: SHIPPED | OUTPATIENT
Start: 2024-11-25

## 2024-12-19 ENCOUNTER — HOSPITAL ENCOUNTER (OUTPATIENT)
Age: 74
Discharge: HOME | End: 2024-12-19
Payer: MEDICARE

## 2024-12-19 LAB
ALBUMIN SERPL-MCNC: 4 G/DL (ref 3.4–5)
ANION GAP SERPL CALCULATED.3IONS-SCNC: 11 MMOL/L (ref 3–16)
BUN SERPL-MCNC: 14 MG/DL (ref 7–20)
CALCIUM SERPL-MCNC: 9.2 MG/DL (ref 8.3–10.6)
CHLORIDE SERPL-SCNC: 107 MMOL/L (ref 99–110)
CHOLEST SERPL-MCNC: 142 MG/DL (ref 0–199)
CO2 SERPL-SCNC: 26 MMOL/L (ref 21–32)
CREAT SERPL-MCNC: 1.4 MG/DL (ref 0.8–1.3)
CREAT UR-MCNC: 127 MG/DL (ref 39–259)
GFR SERPLBLD CREATININE-BSD FMLA CKD-EPI: 53 ML/MIN/{1.73_M2}
GLUCOSE SERPL-MCNC: 87 MG/DL (ref 70–99)
HDLC SERPL-MCNC: 44 MG/DL (ref 40–60)
LDLC SERPL CALC-MCNC: 77 MG/DL
MICROALBUMIN UR DL<=1MG/L-MCNC: 1.54 MG/DL
MICROALBUMIN/CREAT UR: 12.1 MG/G (ref 0–30)
PHOSPHATE SERPL-MCNC: 3.5 MG/DL (ref 2.5–4.9)
POTASSIUM SERPL-SCNC: 4.3 MMOL/L (ref 3.5–5.1)
PSA SERPL DL<=0.01 NG/ML-MCNC: 5.48 NG/ML (ref 0–4)
SODIUM SERPL-SCNC: 144 MMOL/L (ref 136–145)
TRIGL SERPL-MCNC: 107 MG/DL (ref 0–150)
URATE SERPL-MCNC: 6.2 MG/DL (ref 3.5–7.2)
VLDLC SERPL CALC-MCNC: 21 MG/DL

## 2024-12-19 PROCEDURE — 84153 ASSAY OF PSA TOTAL: CPT

## 2024-12-19 PROCEDURE — 36415 COLL VENOUS BLD VENIPUNCTURE: CPT

## 2024-12-19 PROCEDURE — 80069 RENAL FUNCTION PANEL: CPT

## 2024-12-19 PROCEDURE — 83036 HEMOGLOBIN GLYCOSYLATED A1C: CPT

## 2024-12-19 PROCEDURE — 82043 UR ALBUMIN QUANTITATIVE: CPT

## 2024-12-19 PROCEDURE — 82570 ASSAY OF URINE CREATININE: CPT

## 2024-12-19 PROCEDURE — 84550 ASSAY OF BLOOD/URIC ACID: CPT

## 2024-12-19 PROCEDURE — 80061 LIPID PANEL: CPT

## 2024-12-20 LAB
EST. AVERAGE GLUCOSE BLD GHB EST-MCNC: 131.2 MG/DL
HBA1C MFR BLD: 6.2 %

## 2024-12-26 ENCOUNTER — HOSPITAL ENCOUNTER (OUTPATIENT)
Age: 74
Discharge: HOME | End: 2024-12-28
Attending: INTERNAL MEDICINE
Payer: MEDICARE

## 2024-12-26 VITALS
DIASTOLIC BLOOD PRESSURE: 85 MMHG | HEIGHT: 69 IN | BODY MASS INDEX: 33.18 KG/M2 | SYSTOLIC BLOOD PRESSURE: 144 MMHG | WEIGHT: 224 LBS

## 2024-12-26 DIAGNOSIS — R00.1 BRADYCARDIA: ICD-10-CM

## 2024-12-26 DIAGNOSIS — R07.9 CHEST PAIN, UNSPECIFIED TYPE: ICD-10-CM

## 2024-12-26 LAB
ECHO AO ASC DIAM: 3.6 CM
ECHO AO ASCENDING AORTA INDEX: 1.66 CM/M2
ECHO AO ROOT DIAM: 3.1 CM
ECHO AO ROOT INDEX: 1.43 CM/M2
ECHO AV AREA PEAK VELOCITY: 2.5 CM2
ECHO AV AREA VTI: 2.7 CM2
ECHO AV AREA/BSA PEAK VELOCITY: 1.2 CM2/M2
ECHO AV AREA/BSA VTI: 1.2 CM2/M2
ECHO AV MEAN GRADIENT: 4 MMHG
ECHO AV MEAN VELOCITY: 1 M/S
ECHO AV PEAK GRADIENT: 8 MMHG
ECHO AV PEAK VELOCITY: 1.4 M/S
ECHO AV VELOCITY RATIO: 0.64
ECHO AV VTI: 28 CM
ECHO BSA: 2.22 M2
ECHO EST RA PRESSURE: 8 MMHG
ECHO IVC INSP: 0.9 CM
ECHO IVC PROX: 1.3 CM
ECHO LA AREA 2C: 26.6 CM2
ECHO LA AREA 4C: 30 CM2
ECHO LA MAJOR AXIS: 6.5 CM
ECHO LA MINOR AXIS: 6.6 CM
ECHO LA VOL BP: 102 ML (ref 18–58)
ECHO LA VOL MOD A2C: 89 ML (ref 18–58)
ECHO LA VOL MOD A4C: 118 ML (ref 18–58)
ECHO LA VOL/BSA BIPLANE: 47 ML/M2 (ref 16–34)
ECHO LA VOLUME INDEX MOD A2C: 41 ML/M2 (ref 16–34)
ECHO LA VOLUME INDEX MOD A4C: 54 ML/M2 (ref 16–34)
ECHO LV E' LATERAL VELOCITY: 7.05 CM/S
ECHO LV E' SEPTAL VELOCITY: 5.43 CM/S
ECHO LV EDV A2C: 135 ML
ECHO LV EDV A4C: 122 ML
ECHO LV EDV INDEX A4C: 56 ML/M2
ECHO LV EDV NDEX A2C: 62 ML/M2
ECHO LV EJECTION FRACTION A2C: 50 %
ECHO LV EJECTION FRACTION A4C: 24 %
ECHO LV EJECTION FRACTION BIPLANE: 40 % (ref 55–100)
ECHO LV ESV A2C: 68 ML
ECHO LV ESV A4C: 93 ML
ECHO LV ESV INDEX A2C: 31 ML/M2
ECHO LV ESV INDEX A4C: 43 ML/M2
ECHO LV FRACTIONAL SHORTENING: 31 % (ref 28–44)
ECHO LV INTERNAL DIMENSION DIASTOLE INDEX: 2.21 CM/M2
ECHO LV INTERNAL DIMENSION DIASTOLIC: 4.8 CM (ref 4.2–5.9)
ECHO LV INTERNAL DIMENSION SYSTOLIC INDEX: 1.52 CM/M2
ECHO LV INTERNAL DIMENSION SYSTOLIC: 3.3 CM
ECHO LV IVSD: 1.4 CM (ref 0.6–1)
ECHO LV MASS 2D: 245.7 G (ref 88–224)
ECHO LV MASS INDEX 2D: 113.2 G/M2 (ref 49–115)
ECHO LV POSTERIOR WALL DIASTOLIC: 1.2 CM (ref 0.6–1)
ECHO LV RELATIVE WALL THICKNESS RATIO: 0.5
ECHO LVOT AREA: 3.8 CM2
ECHO LVOT AV VTI INDEX: 0.71
ECHO LVOT DIAM: 2.2 CM
ECHO LVOT MEAN GRADIENT: 2 MMHG
ECHO LVOT PEAK GRADIENT: 3 MMHG
ECHO LVOT PEAK VELOCITY: 0.9 M/S
ECHO LVOT STROKE VOLUME INDEX: 35 ML/M2
ECHO LVOT SV: 76 ML
ECHO LVOT VTI: 20 CM
ECHO MV A VELOCITY: 0.97 M/S
ECHO MV E VELOCITY: 0.61 M/S
ECHO MV E/A RATIO: 0.63
ECHO MV E/E' LATERAL: 8.65
ECHO MV E/E' RATIO (AVERAGED): 9.94
ECHO MV E/E' SEPTAL: 11.23
ECHO PV MAX VELOCITY: 1.1 M/S
ECHO PV PEAK GRADIENT: 4 MMHG
ECHO RA AREA 4C: 19.8 CM2
ECHO RA END SYSTOLIC VOLUME APICAL 4 CHAMBER INDEX BSA: 27 ML/M2
ECHO RA VOLUME: 58 ML
ECHO RIGHT VENTRICULAR SYSTOLIC PRESSURE (RVSP): 28 MMHG
ECHO RV BASAL DIMENSION: 4.3 CM
ECHO RV FREE WALL PEAK S': 10.1 CM/S
ECHO RV LONGITUDINAL DIMENSION: 7.2 CM
ECHO RV MID DIMENSION: 2.7 CM
ECHO RV TAPSE: 1.8 CM (ref 1.7–?)
ECHO TV REGURGITANT MAX VELOCITY: 2.22 M/S
ECHO TV REGURGITANT PEAK GRADIENT: 20 MMHG

## 2024-12-26 PROCEDURE — 6360000004 HC RX CONTRAST MEDICATION: Performed by: INTERNAL MEDICINE

## 2024-12-26 PROCEDURE — 93306 TTE W/DOPPLER COMPLETE: CPT | Performed by: INTERNAL MEDICINE

## 2024-12-26 PROCEDURE — C8929 TTE W OR WO FOL WCON,DOPPLER: HCPCS

## 2024-12-26 RX ORDER — ONDANSETRON 2 MG/ML
4 INJECTION INTRAMUSCULAR; INTRAVENOUS EVERY 6 HOURS PRN
Status: CANCELLED | OUTPATIENT
Start: 2024-12-26

## 2024-12-26 RX ORDER — MAGNESIUM SULFATE IN WATER 40 MG/ML
2000 INJECTION, SOLUTION INTRAVENOUS PRN
Status: CANCELLED | OUTPATIENT
Start: 2024-12-26

## 2024-12-26 RX ORDER — POLYETHYLENE GLYCOL 3350 17 G/17G
17 POWDER, FOR SOLUTION ORAL DAILY PRN
Status: CANCELLED | OUTPATIENT
Start: 2024-12-26

## 2024-12-26 RX ORDER — POTASSIUM CHLORIDE 1500 MG/1
40 TABLET, EXTENDED RELEASE ORAL PRN
Status: CANCELLED | OUTPATIENT
Start: 2024-12-26

## 2024-12-26 RX ORDER — ENOXAPARIN SODIUM 100 MG/ML
30 INJECTION SUBCUTANEOUS 2 TIMES DAILY
Status: CANCELLED | OUTPATIENT
Start: 2024-12-26

## 2024-12-26 RX ORDER — ASPIRIN 81 MG/1
81 TABLET, CHEWABLE ORAL DAILY
Status: CANCELLED | OUTPATIENT
Start: 2024-12-26

## 2024-12-26 RX ORDER — SODIUM CHLORIDE 0.9 % (FLUSH) 0.9 %
5-40 SYRINGE (ML) INJECTION EVERY 12 HOURS SCHEDULED
Status: CANCELLED | OUTPATIENT
Start: 2024-12-26

## 2024-12-26 RX ORDER — SODIUM CHLORIDE 0.9 % (FLUSH) 0.9 %
5-40 SYRINGE (ML) INJECTION PRN
Status: CANCELLED | OUTPATIENT
Start: 2024-12-26

## 2024-12-26 RX ORDER — ACETAMINOPHEN 325 MG/1
650 TABLET ORAL EVERY 6 HOURS PRN
Status: CANCELLED | OUTPATIENT
Start: 2024-12-26

## 2024-12-26 RX ORDER — ACETAMINOPHEN 650 MG/1
650 SUPPOSITORY RECTAL EVERY 6 HOURS PRN
Status: CANCELLED | OUTPATIENT
Start: 2024-12-26

## 2024-12-26 RX ORDER — SODIUM CHLORIDE 9 MG/ML
INJECTION, SOLUTION INTRAVENOUS PRN
Status: CANCELLED | OUTPATIENT
Start: 2024-12-26

## 2024-12-26 RX ORDER — POTASSIUM CHLORIDE 7.45 MG/ML
10 INJECTION INTRAVENOUS PRN
Status: CANCELLED | OUTPATIENT
Start: 2024-12-26

## 2024-12-26 RX ORDER — ONDANSETRON 4 MG/1
4 TABLET, ORALLY DISINTEGRATING ORAL EVERY 8 HOURS PRN
Status: CANCELLED | OUTPATIENT
Start: 2024-12-26

## 2024-12-26 RX ADMIN — SULFUR HEXAFLUORIDE 2 ML: 60.7; .19; .19 INJECTION, POWDER, LYOPHILIZED, FOR SUSPENSION INTRAVENOUS; INTRAVESICAL at 09:12

## 2025-02-20 RX ORDER — ATORVASTATIN CALCIUM 10 MG/1
10 TABLET, FILM COATED ORAL DAILY
Qty: 90 TABLET | Refills: 1 | Status: SHIPPED | OUTPATIENT
Start: 2025-02-20

## 2025-02-20 NOTE — TELEPHONE ENCOUNTER
Requested Prescriptions     Pending Prescriptions Disp Refills    atorvastatin (LIPITOR) 10 MG tablet [Pharmacy Med Name: ATORVASTATIN 10MG TABLETS] 90 tablet 1     Sig: TAKE 1 TABLET BY MOUTH IN THE MORNING            Checked Correct Pharmacy: Yes    Any changes since last refill? No     Number: 90  Refills: 1    Last OV: 9/28/2022 Provider: VJ 10/4/24    Next OV: Visit date not found Provider: VJ 3/13/25    Last Labs:   Lipid:   Lab Results   Component Value Date    CHOL 142 12/19/2024    TRIG 107 12/19/2024    HDL 44 12/19/2024    LDL 77 12/19/2024    VLDL 21 12/19/2024

## 2025-03-13 ENCOUNTER — OFFICE VISIT (OUTPATIENT)
Dept: CARDIOLOGY CLINIC | Age: 75
End: 2025-03-13
Payer: MEDICARE

## 2025-03-13 VITALS
DIASTOLIC BLOOD PRESSURE: 80 MMHG | BODY MASS INDEX: 32.93 KG/M2 | HEART RATE: 82 BPM | WEIGHT: 223 LBS | SYSTOLIC BLOOD PRESSURE: 140 MMHG

## 2025-03-13 DIAGNOSIS — I44.2 CHB (COMPLETE HEART BLOCK) (HCC): ICD-10-CM

## 2025-03-13 DIAGNOSIS — E78.5 HYPERLIPIDEMIA LDL GOAL <70: Primary | ICD-10-CM

## 2025-03-13 PROCEDURE — 99214 OFFICE O/P EST MOD 30 MIN: CPT | Performed by: INTERNAL MEDICINE

## 2025-03-13 PROCEDURE — 1123F ACP DISCUSS/DSCN MKR DOCD: CPT | Performed by: INTERNAL MEDICINE

## 2025-03-13 PROCEDURE — 3077F SYST BP >= 140 MM HG: CPT | Performed by: INTERNAL MEDICINE

## 2025-03-13 PROCEDURE — 1159F MED LIST DOCD IN RCRD: CPT | Performed by: INTERNAL MEDICINE

## 2025-03-13 PROCEDURE — 3079F DIAST BP 80-89 MM HG: CPT | Performed by: INTERNAL MEDICINE

## 2025-03-13 RX ORDER — CARVEDILOL 12.5 MG/1
12.5 TABLET ORAL 2 TIMES DAILY
Qty: 180 TABLET | Refills: 3 | OUTPATIENT
Start: 2025-03-13

## 2025-03-13 NOTE — PROGRESS NOTES
[facility]  Office Visit           Albert Frankel MD,  [facility]                      Cardiology           Mayi Alanis  1950 March 13, 2025    Primary Cardiologist: Marlys     Here on 3/13/2025 for follow-up and is doing very well.  He is trying to lose some weight.  His lungs are clear his heart is regular extremities show no edema.  Blood pressure is stable.  His last LDL was 77 down from 125 and creatinine is 1.4 which is better for him as well.  All else looks stable and his pacemaker is working beautifully.  We will try to get him on some semaglutide for his weight loss activities.  Refilled his carvedilol return in 6 months.    Pacemaker.    He did have COVID infection and has long haulers which I think is getting better based on his description.  No chest pains no shortness of breath.        Coronavirus vaccine none yet.  And still will not receive vaccine    Coronavirus infection September 2021 had longhaulers and slowly getting better    Obstructive sleep apnea on CPAP  Hypertension  Chronic renal failure creatinine 1.7 now 1.5  Pacemaker implant August 2022    blood clots both legs 9/21 on Eliquis and discontinued       to 77    HLD optimal  GERD stable            Reviewed most recent test with pt. / stress nuclear study showing ejection fraction of 72% and was negative for ischemia       Review of Systems:  Constitutional: Denies  fatigue, weakness, night sweats or fever.   HEENT: Denies new visual changes, ringing in ears, nosebleeds,nasal congestion  Respiratory: Denies new or change in SOB, PND, orthopnea or cough.   Cardiovascular: see HPI  GI: Denies N/V, diarrhea, constipation, abdominal pain, change in bowel habits, melena or hematochezia  : Denies urinary frequency, urgency, incontinence, hematuria or dysuria.  Skin: Denies rash, hives, or cyanosis  Musculoskeletal: Denies joint or muscle aches/pain  Neurological: Denies syncope or TIA-like

## 2025-03-31 ENCOUNTER — TELEPHONE (OUTPATIENT)
Dept: ADMINISTRATIVE | Age: 75
End: 2025-03-31

## 2025-03-31 NOTE — TELEPHONE ENCOUNTER
Submitted PA for Ozempic (0.25 or 0.5 MG/DOSE) 2MG/3ML pen-injectors  Via CMM Key: [id number] STATUS: PENDING.    Follow up done daily; if no decision with in three days we will refax.  If another three days goes by with no decision will call the insurance for status.

## 2025-04-01 NOTE — TELEPHONE ENCOUNTER
Called and left the patient a voicemail letting him know that he was approved through insurance for Ozempic.

## 2025-05-20 DIAGNOSIS — E78.5 HYPERLIPIDEMIA LDL GOAL <70: ICD-10-CM

## 2025-05-20 DIAGNOSIS — I44.2 CHB (COMPLETE HEART BLOCK) (HCC): ICD-10-CM

## 2025-05-21 RX ORDER — SEMAGLUTIDE 0.68 MG/ML
INJECTION, SOLUTION SUBCUTANEOUS
Qty: 3 ML | Refills: 1 | Status: SHIPPED | OUTPATIENT
Start: 2025-05-21

## 2025-06-20 ENCOUNTER — TELEPHONE (OUTPATIENT)
Dept: CARDIOLOGY CLINIC | Age: 75
End: 2025-06-20

## 2025-06-20 NOTE — TELEPHONE ENCOUNTER
Called and left patient a voicemail letting him know that he needs to reach out to his pcp for a refill on his ozempic.

## 2025-06-20 NOTE — TELEPHONE ENCOUNTER
[facility] Medication Refills:    Medication  OZEMPIC, 0.25 OR 0.5 MG/DOSE, 2 MG/3ML SOPN [[id number]]  OZEMPIC, 0.25 OR 0.5 MG/DOSE, 2 MG/3ML SOPN [[id number]]    Order Details  Dose, Route, Frequency: As Directed   Dispense Quantity: 3 mL Refills: 1          Sig: INJECT 0.5MG SUBCUTANEOUSLY INTO THE SKIN EVERY 7 DAYS.         Start Date: 05/21/25 End Date: --   Written Date: 05/21/25 Expiration Date: 05/21/26       Pharmacy    [id number] - [place], [place] - [facility] [address] - P [phone] - F [phone]  [facility] [address] [facility] [address]  Phone: [phone]  Fax: [phone]       Last Office Visit: 03/13/25    Next Office Visit: 07/23/25    Last Refill: 05/21/25    Last Labs: 12/1924

## 2025-06-26 ENCOUNTER — PATIENT MESSAGE (OUTPATIENT)
Dept: CARDIOLOGY CLINIC | Age: 75
End: 2025-06-26

## 2025-07-08 ENCOUNTER — HOSPITAL ENCOUNTER (OUTPATIENT)
Age: 75
Discharge: HOME | End: 2025-07-08
Payer: MEDICARE

## 2025-07-08 DIAGNOSIS — N18.30 STAGE 3 CHRONIC KIDNEY DISEASE, UNSPECIFIED WHETHER STAGE 3A OR 3B CKD (HCC): ICD-10-CM

## 2025-07-08 LAB
ALBUMIN SERPL-MCNC: 3.9 G/DL (ref 3.4–5)
ALBUMIN/GLOB SERPL: 1.3 {RATIO} (ref 1.1–2.2)
ALP SERPL-CCNC: 94 U/L (ref 40–129)
ALT SERPL-CCNC: 32 U/L (ref 10–40)
ANION GAP SERPL CALCULATED.3IONS-SCNC: 11 MMOL/L (ref 3–16)
AST SERPL-CCNC: 27 U/L (ref 15–37)
BASOPHILS # BLD: 0 K/UL (ref 0–0.2)
BASOPHILS NFR BLD: 0 %
BILIRUB SERPL-MCNC: 1.2 MG/DL (ref 0–1)
BUN SERPL-MCNC: 29 MG/DL (ref 7–20)
CALCIUM SERPL-MCNC: 9.2 MG/DL (ref 8.3–10.6)
CHLORIDE SERPL-SCNC: 107 MMOL/L (ref 99–110)
CO2 SERPL-SCNC: 23 MMOL/L (ref 21–32)
CREAT SERPL-MCNC: 1.5 MG/DL (ref 0.8–1.3)
CREAT UR-MCNC: 122 MG/DL (ref 39–259)
DEPRECATED RDW RBC AUTO: 13.2 % (ref 12.4–15.4)
EOSINOPHIL # BLD: 0.3 K/UL (ref 0–0.6)
EOSINOPHIL NFR BLD: 4 %
GFR SERPLBLD CREATININE-BSD FMLA CKD-EPI: 49 ML/MIN/{1.73_M2}
GLUCOSE SERPL-MCNC: 91 MG/DL (ref 70–99)
HCT VFR BLD AUTO: 38.1 % (ref 40.5–52.5)
HGB BLD-MCNC: 12.9 G/DL (ref 13.5–17.5)
LYMPHOCYTES # BLD: 1.3 K/UL (ref 1–5.1)
LYMPHOCYTES NFR BLD: 21 %
MCH RBC QN AUTO: 29.7 PG (ref 26–34)
MCHC RBC AUTO-ENTMCNC: 33.9 G/DL (ref 31–36)
MCV RBC AUTO: 87.5 FL (ref 80–100)
MICROALBUMIN UR DL<=1MG/L-MCNC: <1.2 MG/DL
MICROALBUMIN/CREAT UR: NORMAL MG/G (ref 0–30)
MONOCYTES # BLD: 0.3 K/UL (ref 0–1.3)
MONOCYTES NFR BLD: 5 %
NEUTROPHILS # BLD: 4.5 K/UL (ref 1.7–7.7)
NEUTROPHILS NFR BLD: 70 %
PHOSPHATE SERPL-MCNC: 3.5 MG/DL (ref 2.5–4.9)
PLATELET # BLD AUTO: 148 K/UL (ref 135–450)
PMV BLD AUTO: 9.4 FL (ref 5–10.5)
POTASSIUM SERPL-SCNC: 4.1 MMOL/L (ref 3.5–5.1)
PROT SERPL-MCNC: 6.9 G/DL (ref 6.4–8.2)
PSA SERPL DL<=0.01 NG/ML-MCNC: 6.18 NG/ML (ref 0–4)
RBC # BLD AUTO: 4.35 M/UL (ref 4.2–5.9)
RBC MORPH BLD: NORMAL
SODIUM SERPL-SCNC: 141 MMOL/L (ref 136–145)
WBC # BLD AUTO: 6.4 K/UL (ref 4–11)

## 2025-07-08 PROCEDURE — 84100 ASSAY OF PHOSPHORUS: CPT

## 2025-07-08 PROCEDURE — 80053 COMPREHEN METABOLIC PANEL: CPT

## 2025-07-08 PROCEDURE — 82570 ASSAY OF URINE CREATININE: CPT

## 2025-07-08 PROCEDURE — 82043 UR ALBUMIN QUANTITATIVE: CPT

## 2025-07-08 PROCEDURE — 85025 COMPLETE CBC W/AUTO DIFF WBC: CPT

## 2025-07-08 PROCEDURE — 84153 ASSAY OF PSA TOTAL: CPT

## 2025-07-08 PROCEDURE — 36415 COLL VENOUS BLD VENIPUNCTURE: CPT

## 2025-07-11 ENCOUNTER — OFFICE VISIT (OUTPATIENT)
Dept: CARDIOLOGY CLINIC | Age: 75
End: 2025-07-11
Payer: MEDICARE

## 2025-07-11 VITALS
SYSTOLIC BLOOD PRESSURE: 142 MMHG | DIASTOLIC BLOOD PRESSURE: 82 MMHG | HEIGHT: 69 IN | BODY MASS INDEX: 31.4 KG/M2 | WEIGHT: 212 LBS | HEART RATE: 64 BPM

## 2025-07-11 DIAGNOSIS — I44.2 CHB (COMPLETE HEART BLOCK) (HCC): ICD-10-CM

## 2025-07-11 DIAGNOSIS — E78.49 OTHER HYPERLIPIDEMIA: Primary | ICD-10-CM

## 2025-07-11 DIAGNOSIS — G47.33 OBSTRUCTIVE SLEEP APNEA (ADULT) (PEDIATRIC): Chronic | ICD-10-CM

## 2025-07-11 DIAGNOSIS — I10 BENIGN ESSENTIAL HTN: Chronic | ICD-10-CM

## 2025-07-11 DIAGNOSIS — Z95.0 PACEMAKER: ICD-10-CM

## 2025-07-11 PROCEDURE — 1160F RVW MEDS BY RX/DR IN RCRD: CPT | Performed by: NURSE PRACTITIONER

## 2025-07-11 PROCEDURE — 3079F DIAST BP 80-89 MM HG: CPT | Performed by: NURSE PRACTITIONER

## 2025-07-11 PROCEDURE — 99215 OFFICE O/P EST HI 40 MIN: CPT | Performed by: NURSE PRACTITIONER

## 2025-07-11 PROCEDURE — 3077F SYST BP >= 140 MM HG: CPT | Performed by: NURSE PRACTITIONER

## 2025-07-11 PROCEDURE — 1159F MED LIST DOCD IN RCRD: CPT | Performed by: NURSE PRACTITIONER

## 2025-07-11 PROCEDURE — 1123F ACP DISCUSS/DSCN MKR DOCD: CPT | Performed by: NURSE PRACTITIONER

## 2025-07-11 RX ORDER — LOSARTAN POTASSIUM 50 MG/1
50 TABLET ORAL DAILY
Qty: 90 TABLET | Refills: 3 | Status: SHIPPED | OUTPATIENT
Start: 2025-07-11

## 2025-07-11 RX ORDER — METOPROLOL SUCCINATE 25 MG/1
25 TABLET, EXTENDED RELEASE ORAL DAILY
Qty: 90 TABLET | Refills: 3 | Status: SHIPPED | OUTPATIENT
Start: 2025-07-11

## 2025-07-11 NOTE — PROGRESS NOTES
[facility]     Outpatient Follow Up Note  Kerri Alvarado RN, APRN,CNP     CHIEF COMPLAINT / HPI:  Mayi Alanis is 74 y.o. male who presents today with   c/o occas SOB since had Covid   MC on CPAP   Hypertension controlled   CRI 1.7 now 1.5 stable follows neph   CHB MDT  6/10/25 A PACING 55.22% RV PACING 92.57% AT/AF BURDEN 0% / CHB   s/p dual ch MDT PPM (8/30/22, Dr. Carrasco), CXR showed atrial lead dropped post procedure, s/p atrial lead revision (8/31/22, Dr. Carrasco)   DVT blood clots both legs 9/21 on Eliquis and discontinued  HLD optimal  GERD stable       Today wt down few pounds / active and Ozempic   b/p higher today not taking his coreg states pharmacist said it was a recall/ will prescribe Toprol 25 mg daily and restart his losartan he isn't taking it   complaint with meds   Discussed nutrition / sodium intake / fluid intake   Recommend activity as tolerated     Past Medical History:   Diagnosis Date    Anesthesia     high blood pressure after surgery    Cancer (HCC)     COLON    DVT (deep venous thrombosis) (HCC)     GERD without esophagitis 5/2/2019    Hypertension     Hypertension, essential 5/2/2019    Obstructive sleep apnea (adult) (pediatric)      Social History:    Social History     Tobacco Use   Smoking Status Never   Smokeless Tobacco Never     Current Medications:  Current Outpatient Medications   Medication Sig Dispense Refill    OZEMPIC, 0.25 OR 0.5 MG/DOSE, 2 MG/3ML SOPN INJECT 0.5MG SUBCUTANEOUSLY INTO THE SKIN EVERY 7 DAYS. 3 mL 1    atorvastatin (LIPITOR) 10 MG tablet TAKE 1 TABLET BY MOUTH IN THE MORNING 90 tablet 1    tamsulosin (FLOMAX) 0.4 MG capsule Take 1 capsule by mouth every evening      eplerenone (INSPRA) 50 MG tablet TAKE 1 TABLET BY MOUTH DAILY 90 tablet 5    Cholecalciferol (VITAMIN D3) 50 MCG (2000 UT) CAPS Take by mouth daily      ferrous sulfate 325 (65 Fe) MG tablet Take 1 tablet by mouth      amLODIPine (NORVASC) 10 MG tablet Take 1 tablet by mouth daily

## 2025-07-18 ENCOUNTER — TELEPHONE (OUTPATIENT)
Dept: CARDIOLOGY CLINIC | Age: 75
End: 2025-07-18

## 2025-07-18 NOTE — TELEPHONE ENCOUNTER
Amlodipine 10 mg daily is also listed on medication list    Cont losartan 50 mg daily and toprol 25 mg daily     Pt has known HFmrEF EF 45-50%. Recommend starting Jardiance 10 mg daily which may also help with shaneka blood pressures.     Cont amlodipine 10 mg daily  Losartan 50 mg daily  Toprol 25 mg daily    Be sure to get the labs Kerri ordered last visit.

## 2025-07-18 NOTE — TELEPHONE ENCOUNTER
The patient called stating he was seen in the office on 07/11/25 for his B/P running high.The patient states he took his B/P today and it's still running high  155/100 HR 88. The patient also complains of being very fatigue. The patient's B/P medications are listed below      Medication  losartan (COZAAR) 50 MG tablet [[id number]]  losartan (COZAAR) 50 MG tablet [[id number]]    Order Details  Dose: 50 mg Route: Oral Frequency: DAILY   Dispense Quantity: 90 tablet Refills: 3          Sig: Take 1 tablet by mouth daily             Medication  metoprolol succinate (TOPROL XL) 25 MG extended release tablet [[id number]]  metoprolol succinate (TOPROL XL) 25 MG extended release tablet [[id number]]    Order Details  Dose: 25 mg Route: Oral Frequency: DAILY   Dispense Quantity: 90 tablet Refills: 3          Sig: Take 1 tablet by mouth daily     The patient was informed that Kerri PADRON is out of the office until Tuesday 07/22/25 but he would like to know what he is suppose to do over the weekend. Please advise [phone]

## 2025-07-21 NOTE — TELEPHONE ENCOUNTER
Appointment scheduled. Family concerned about amount of medications we're giving for the high blood pressure. Instructed pt to get labs prior to appt Wednesday. Verbalized understanding.

## 2025-08-05 ENCOUNTER — HOSPITAL ENCOUNTER (OUTPATIENT)
Age: 75
Discharge: HOME | End: 2025-08-05
Payer: MEDICARE

## 2025-08-05 DIAGNOSIS — Z95.0 PACEMAKER: ICD-10-CM

## 2025-08-05 DIAGNOSIS — I10 BENIGN ESSENTIAL HTN: Chronic | ICD-10-CM

## 2025-08-05 DIAGNOSIS — G47.33 OBSTRUCTIVE SLEEP APNEA (ADULT) (PEDIATRIC): Chronic | ICD-10-CM

## 2025-08-05 DIAGNOSIS — E78.49 OTHER HYPERLIPIDEMIA: ICD-10-CM

## 2025-08-05 DIAGNOSIS — I44.2 CHB (COMPLETE HEART BLOCK) (HCC): ICD-10-CM

## 2025-08-05 LAB
25(OH)D3 SERPL-MCNC: 25.8 NG/ML
ALBUMIN SERPL-MCNC: 4 G/DL (ref 3.4–5)
ALBUMIN/GLOB SERPL: 1.3 {RATIO} (ref 1.1–2.2)
ALP SERPL-CCNC: 95 U/L (ref 40–129)
ALT SERPL-CCNC: 37 U/L (ref 10–40)
ANION GAP SERPL CALCULATED.3IONS-SCNC: 12 MMOL/L (ref 3–16)
AST SERPL-CCNC: 29 U/L (ref 15–37)
BILIRUB DIRECT SERPL-MCNC: 0.3 MG/DL (ref 0–0.3)
BILIRUB INDIRECT SERPL-MCNC: 0.5 MG/DL (ref 0–1)
BILIRUB SERPL-MCNC: 0.8 MG/DL (ref 0–1)
BUN SERPL-MCNC: 16 MG/DL (ref 7–20)
CALCIUM SERPL-MCNC: 9.6 MG/DL (ref 8.3–10.6)
CHLORIDE SERPL-SCNC: 106 MMOL/L (ref 99–110)
CHOLEST SERPL-MCNC: 113 MG/DL (ref 0–199)
CO2 SERPL-SCNC: 24 MMOL/L (ref 21–32)
CREAT SERPL-MCNC: 1.3 MG/DL (ref 0.8–1.3)
DEPRECATED RDW RBC AUTO: 13.3 % (ref 12.4–15.4)
EST. AVERAGE GLUCOSE BLD GHB EST-MCNC: 116.9 MG/DL
FERRITIN SERPL IA-MCNC: 370 NG/ML (ref 30–400)
FOLATE SERPL-MCNC: 9.42 NG/ML (ref 4.78–24.2)
GFR SERPLBLD CREATININE-BSD FMLA CKD-EPI: 58 ML/MIN/{1.73_M2}
GLUCOSE SERPL-MCNC: 87 MG/DL (ref 70–99)
HBA1C MFR BLD: 5.7 %
HCT VFR BLD AUTO: 39.3 % (ref 40.5–52.5)
HDLC SERPL-MCNC: 44 MG/DL (ref 40–60)
HGB BLD-MCNC: 13.1 G/DL (ref 13.5–17.5)
IRON SATN MFR SERPL: 34 % (ref 20–50)
IRON SERPL-MCNC: 83 UG/DL (ref 59–158)
LDLC SERPL CALC-MCNC: 53 MG/DL
MAGNESIUM SERPL-MCNC: 1.84 MG/DL (ref 1.8–2.4)
MCH RBC QN AUTO: 29.5 PG (ref 26–34)
MCHC RBC AUTO-ENTMCNC: 33.4 G/DL (ref 31–36)
MCV RBC AUTO: 88.2 FL (ref 80–100)
PLATELET # BLD AUTO: ABNORMAL K/UL (ref 135–450)
PLATELET BLD QL SMEAR: ABNORMAL
PMV BLD AUTO: ABNORMAL FL (ref 5–10.5)
POTASSIUM SERPL-SCNC: 4.2 MMOL/L (ref 3.5–5.1)
PROT SERPL-MCNC: 7.1 G/DL (ref 6.4–8.2)
RBC # BLD AUTO: 4.45 M/UL (ref 4.2–5.9)
SLIDE REVIEW: ABNORMAL
SODIUM SERPL-SCNC: 142 MMOL/L (ref 136–145)
TIBC SERPL-MCNC: 246 UG/DL (ref 260–445)
TRIGL SERPL-MCNC: 78 MG/DL (ref 0–150)
TSH SERPL DL<=0.005 MIU/L-ACNC: 1.98 UIU/ML (ref 0.27–4.2)
VIT B12 SERPL-MCNC: 421 PG/ML (ref 211–911)
VLDLC SERPL CALC-MCNC: 16 MG/DL
WBC # BLD AUTO: 6.4 K/UL (ref 4–11)

## 2025-08-05 PROCEDURE — 82728 ASSAY OF FERRITIN: CPT

## 2025-08-05 PROCEDURE — 83036 HEMOGLOBIN GLYCOSYLATED A1C: CPT

## 2025-08-05 PROCEDURE — 84443 ASSAY THYROID STIM HORMONE: CPT

## 2025-08-05 PROCEDURE — 83735 ASSAY OF MAGNESIUM: CPT

## 2025-08-05 PROCEDURE — 82248 BILIRUBIN DIRECT: CPT

## 2025-08-05 PROCEDURE — 85027 COMPLETE CBC AUTOMATED: CPT

## 2025-08-05 PROCEDURE — 82607 VITAMIN B-12: CPT

## 2025-08-05 PROCEDURE — 83540 ASSAY OF IRON: CPT

## 2025-08-05 PROCEDURE — 80061 LIPID PANEL: CPT

## 2025-08-05 PROCEDURE — 83550 IRON BINDING TEST: CPT

## 2025-08-05 PROCEDURE — 80053 COMPREHEN METABOLIC PANEL: CPT

## 2025-08-05 PROCEDURE — 82746 ASSAY OF FOLIC ACID SERUM: CPT

## 2025-08-05 PROCEDURE — 36415 COLL VENOUS BLD VENIPUNCTURE: CPT

## 2025-08-05 PROCEDURE — 82306 VITAMIN D 25 HYDROXY: CPT

## 2025-08-14 ENCOUNTER — HOSPITAL ENCOUNTER (OUTPATIENT)
Age: 75
Discharge: HOME | End: 2025-08-14
Payer: MEDICARE

## 2025-08-14 LAB — PSA SERPL DL<=0.01 NG/ML-MCNC: 5.74 NG/ML (ref 0–4)

## 2025-08-14 PROCEDURE — 84153 ASSAY OF PSA TOTAL: CPT

## 2025-08-14 PROCEDURE — 36415 COLL VENOUS BLD VENIPUNCTURE: CPT

## 2025-08-25 ENCOUNTER — TELEPHONE (OUTPATIENT)
Dept: SLEEP CENTER | Age: 75
End: 2025-08-25

## 2025-09-03 ENCOUNTER — TRANSCRIBE ORDERS (OUTPATIENT)
Dept: ADMINISTRATIVE | Age: 75
End: 2025-09-03

## 2025-09-03 DIAGNOSIS — C61 MALIGNANT NEOPLASM OF PROSTATE (HCC): Primary | ICD-10-CM

## (undated) DEVICE — DRAPE,U/ SHT,SPLIT,PLAS,STERIL: Brand: MEDLINE

## (undated) DEVICE — DECANTER FLD 9IN ST BG FOR ASEP TRNSF OF FLD

## (undated) DEVICE — 3M™ STERI-STRIP™ REINFORCED ADHESIVE SKIN CLOSURES, R1547, 1/2 IN X 4 IN (12 MM X 100 MM), 6 STRIPS/ENVELOPE: Brand: 3M™ STERI-STRIP™

## (undated) DEVICE — MAJOR SET UP PK

## (undated) DEVICE — APPLICATOR MEDICATED 26 CC SOLUTION HI LT ORNG CHLORAPREP

## (undated) DEVICE — COVER LT HNDL BLU PLAS

## (undated) DEVICE — PENCIL SMK EVAC TELSCP 3 M TBNG

## (undated) DEVICE — Device: Brand: COVER, PERINEAL POST, 12 PK

## (undated) DEVICE — SUTURE VCRL + SZ 2-0 L18IN ABSRB UD CT1 L36MM 1/2 CIR VCP839D

## (undated) DEVICE — SUTURE MCRYL + SZ 3-0 L27IN ABSRB UD PS1 L24MM 3/8 CIR REV MCP936H

## (undated) DEVICE — HYPODERMIC SAFETY NEEDLE: Brand: MAGELLAN

## (undated) DEVICE — BANDAGE COBAN 4 IN COMPR W4INXL5YD FOAM COHESIVE QUIK STK SELF ADH SFT

## (undated) DEVICE — SUTURE VCRL + SZ 1 L36IN ABSRB UD L36MM CT-1 1/2 CIR VCP947H

## (undated) DEVICE — MERCY FAIRFIELD TURNOVER KIT: Brand: MEDLINE INDUSTRIES, INC.

## (undated) DEVICE — SYRINGE MED 30ML STD CLR PLAS LUERLOCK TIP N CTRL DISP

## (undated) DEVICE — SUTURE STRATAFIX SZ 1 L14IN ABSRB VLT L36MM MO-4 TAPERPOINT SXPD2B400

## (undated) DEVICE — YANKAUER OPEN TIP, NO VENT: Brand: ARGYLE

## (undated) DEVICE — C-ARM: Brand: UNBRANDED

## (undated) DEVICE — PADDING UNDERCAST W4INXL4YD 100% COT CRIMPED FINISH WBRL II

## (undated) DEVICE — HANDPIECE SET WITH HIGH FLOW TIP AND SUCTION TUBE: Brand: INTERPULSE

## (undated) DEVICE — COVER,MAYO STAND,XL,STERILE: Brand: MEDLINE

## (undated) DEVICE — ELECTRODE PT RET AD L9FT HI MOIST COND ADH HYDRGEL CORDED

## (undated) DEVICE — STERILE POLYISOPRENE POWDER-FREE SURGICAL GLOVES WITH EMOLLIENT COATING: Brand: PROTEXIS

## (undated) DEVICE — SUTURE VCRL + SZ 0 L18IN ABSRB UD L36MM CT-1 1/2 CIR VCP840D

## (undated) DEVICE — PACK SURG HIP ASSESSORY

## (undated) DEVICE — DRESSING CNTCT 4X12IN IS THERABOND 3D

## (undated) DEVICE — MAT FLR ABS DISP

## (undated) DEVICE — 450 ML BOTTLE OF 0.05% CHLORHEXIDINE GLUCONATE IN 99.95% STERILE WATER FOR IRRIGATION, USP AND APPLICATOR.: Brand: IRRISEPT ANTIMICROBIAL WOUND LAVAGE

## (undated) DEVICE — BIPOLAR SEALER 23-112-1 AQM 6.0: Brand: AQUAMANTYS ®

## (undated) DEVICE — SPONGE LAP W18XL18IN WHT COT 4 PLY FLD STRUNG RADPQ DISP ST

## (undated) DEVICE — BLANKET WRM W29.9XL79.1IN UP BODY FORC AIR MISTRAL-AIR

## (undated) DEVICE — BLADE SAGITAL 18X90X1.27MM

## (undated) DEVICE — RESTRAINT EXT ANK WRST LT BLU FOAM 2 STRP SIDE BCKL HK AND